# Patient Record
Sex: MALE | Race: WHITE | NOT HISPANIC OR LATINO | Employment: FULL TIME | URBAN - METROPOLITAN AREA
[De-identification: names, ages, dates, MRNs, and addresses within clinical notes are randomized per-mention and may not be internally consistent; named-entity substitution may affect disease eponyms.]

---

## 2017-01-20 RX ORDER — FLUTICASONE PROPIONATE 50 MCG
SPRAY, SUSPENSION (ML) NASAL
Qty: 48 G | Refills: 0 | Status: SHIPPED | OUTPATIENT
Start: 2017-01-20 | End: 2017-09-28 | Stop reason: SDUPTHER

## 2017-02-12 RX ORDER — FINASTERIDE 5 MG/1
TABLET, FILM COATED ORAL
Qty: 90 TABLET | Refills: 1 | Status: SHIPPED | OUTPATIENT
Start: 2017-02-12 | End: 2017-11-29 | Stop reason: SDUPTHER

## 2017-03-03 RX ORDER — PRAVASTATIN SODIUM 40 MG/1
TABLET ORAL
Qty: 90 TABLET | Refills: 0 | Status: SHIPPED | OUTPATIENT
Start: 2017-03-03 | End: 2017-07-30 | Stop reason: SDUPTHER

## 2017-03-27 ENCOUNTER — OFFICE VISIT (OUTPATIENT)
Dept: INTERNAL MEDICINE | Facility: CLINIC | Age: 57
End: 2017-03-27
Payer: COMMERCIAL

## 2017-03-27 ENCOUNTER — HOSPITAL ENCOUNTER (OUTPATIENT)
Dept: RADIOLOGY | Facility: HOSPITAL | Age: 57
Discharge: HOME | End: 2017-03-27
Attending: INTERNAL MEDICINE
Payer: COMMERCIAL

## 2017-03-27 ENCOUNTER — HOSPITAL ENCOUNTER (OUTPATIENT)
Dept: CARDIOLOGY | Facility: CLINIC | Age: 57
Discharge: HOME | End: 2017-03-27
Payer: COMMERCIAL

## 2017-03-27 ENCOUNTER — CLINICAL SUPPORT (OUTPATIENT)
Dept: INTERNAL MEDICINE | Facility: CLINIC | Age: 57
End: 2017-03-27
Payer: COMMERCIAL

## 2017-03-27 ENCOUNTER — CLINICAL SUPPORT (OUTPATIENT)
Dept: INTERNAL MEDICINE | Facility: CLINIC | Age: 57
End: 2017-03-27

## 2017-03-27 VITALS
SYSTOLIC BLOOD PRESSURE: 110 MMHG | HEIGHT: 70 IN | DIASTOLIC BLOOD PRESSURE: 65 MMHG | BODY MASS INDEX: 25.88 KG/M2 | WEIGHT: 180.75 LBS

## 2017-03-27 DIAGNOSIS — Z00.00 ROUTINE GENERAL MEDICAL EXAMINATION AT A HEALTH CARE FACILITY: ICD-10-CM

## 2017-03-27 DIAGNOSIS — R06.83 SNORING: ICD-10-CM

## 2017-03-27 DIAGNOSIS — Z00.00 ROUTINE GENERAL MEDICAL EXAMINATION AT A HEALTH CARE FACILITY: Primary | ICD-10-CM

## 2017-03-27 DIAGNOSIS — Z00.00 ANNUAL PHYSICAL EXAM: Primary | ICD-10-CM

## 2017-03-27 LAB
ALBUMIN SERPL BCP-MCNC: 3.7 G/DL
ALP SERPL-CCNC: 60 U/L
ALT SERPL W/O P-5'-P-CCNC: 21 U/L
ANION GAP SERPL CALC-SCNC: 8 MMOL/L
AST SERPL-CCNC: 21 U/L
BILIRUB SERPL-MCNC: 0.8 MG/DL
BUN SERPL-MCNC: 16 MG/DL
CALCIUM SERPL-MCNC: 9.3 MG/DL
CHLORIDE SERPL-SCNC: 107 MMOL/L
CHOLEST/HDLC SERPL: 3.5 {RATIO}
CO2 SERPL-SCNC: 28 MMOL/L
COMPLEXED PSA SERPL-MCNC: 0.23 NG/ML
CREAT SERPL-MCNC: 1 MG/DL
ERYTHROCYTE [DISTWIDTH] IN BLOOD BY AUTOMATED COUNT: 12.5 %
EST. GFR  (AFRICAN AMERICAN): >60 ML/MIN/1.73 M^2
EST. GFR  (NON AFRICAN AMERICAN): >60 ML/MIN/1.73 M^2
ESTIMATED AVG GLUCOSE: 114 MG/DL
GLUCOSE SERPL-MCNC: 98 MG/DL
HBA1C MFR BLD HPLC: 5.6 %
HCT VFR BLD AUTO: 42.9 %
HDL/CHOLESTEROL RATIO: 28.6 %
HDLC SERPL-MCNC: 154 MG/DL
HDLC SERPL-MCNC: 44 MG/DL
HGB BLD-MCNC: 15.2 G/DL
LDLC SERPL CALC-MCNC: 92.8 MG/DL
MCH RBC QN AUTO: 30.7 PG
MCHC RBC AUTO-ENTMCNC: 35.4 %
MCV RBC AUTO: 87 FL
NONHDLC SERPL-MCNC: 110 MG/DL
PLATELET # BLD AUTO: 243 K/UL
PMV BLD AUTO: 9.6 FL
POTASSIUM SERPL-SCNC: 4.3 MMOL/L
PROT SERPL-MCNC: 7.2 G/DL
RBC # BLD AUTO: 4.95 M/UL
SODIUM SERPL-SCNC: 143 MMOL/L
TRIGL SERPL-MCNC: 86 MG/DL
TSH SERPL DL<=0.005 MIU/L-ACNC: 1.84 UIU/ML
WBC # BLD AUTO: 6.2 K/UL

## 2017-03-27 PROCEDURE — 93000 ELECTROCARDIOGRAM COMPLETE: CPT | Mod: S$GLB,,, | Performed by: INTERNAL MEDICINE

## 2017-03-27 PROCEDURE — 99396 PREV VISIT EST AGE 40-64: CPT | Mod: S$GLB,,, | Performed by: INTERNAL MEDICINE

## 2017-03-27 PROCEDURE — 84443 ASSAY THYROID STIM HORMONE: CPT

## 2017-03-27 PROCEDURE — 97750 PHYSICAL PERFORMANCE TEST: CPT | Mod: S$GLB,,, | Performed by: INTERNAL MEDICINE

## 2017-03-27 PROCEDURE — 85027 COMPLETE CBC AUTOMATED: CPT

## 2017-03-27 PROCEDURE — 99999 PR PBB SHADOW E&M-EST. PATIENT-LVL III: CPT | Mod: PBBFAC,,, | Performed by: INTERNAL MEDICINE

## 2017-03-27 PROCEDURE — 71020 XR CHEST PA AND LATERAL: CPT | Mod: 26,,, | Performed by: RADIOLOGY

## 2017-03-27 PROCEDURE — 83036 HEMOGLOBIN GLYCOSYLATED A1C: CPT

## 2017-03-27 PROCEDURE — 97802 MEDICAL NUTRITION INDIV IN: CPT | Mod: S$GLB,,, | Performed by: INTERNAL MEDICINE

## 2017-03-27 PROCEDURE — 84153 ASSAY OF PSA TOTAL: CPT

## 2017-03-27 PROCEDURE — 80061 LIPID PANEL: CPT

## 2017-03-27 PROCEDURE — 71020 XR CHEST PA AND LATERAL: CPT | Mod: TC

## 2017-03-27 PROCEDURE — 80053 COMPREHEN METABOLIC PANEL: CPT

## 2017-03-27 NOTE — PATIENT INSTRUCTIONS
Obstructive Sleep Apnea  Obstructive sleep apnea is a condition that causes your air passages to become narrowed or blocked during sleep. As a result, breathing stops for short periods. Your body wakes up enough for breathing to begin again, though you don't remember it. The cycle of stopped breathing and brief awakenings can repeat dozens of times a night. This prevents the body from getting to the deeper stages of sleep that are needed for good rest.  Signs of sleep apnea include loud snoring, noisy breathing, and gasping sounds during sleep. Daytime symptoms include waking up tired after a full night's sleep, waking up with headaches, feeling very sleepy or falling asleep during the day, and having problems with memory or concentration.  Risk factors for sleep apnea include:  · Being overweight  · Being a man, or a woman in menopause  · Smoking  · Using alcohol or sedating medications or herbs  · Having enlarged structures in the nose or throat  Home care  Lifestyle changes that can help treat snoring and sleep apnea include the following:  · If you are overweight, lose weight. Talk to your healthcare provider about a weight-loss plan for you.  · Avoid alcohol for 3 to 4 hours before bedtime. Avoid sedating medications. Ask your healthcare provider about the medications you take.  · If you smoke, talk to your healthcare provider about ways to quit.  · Sleep on your side. This can help prevent gravity from pulling relaxed throat tissues into your breathing passages.  · If you have allergies or sinus problems that block your nose, ask your healthcare provider for help.  Follow up  Follow up with your healthcare provider as advised. A diagnosis of sleep apnea is made with a sleep study. Your healthcare provider can tell you more about this test.  When to seek medical care  Sleep apnea can make you more likely to have certain health problems. These include high blood pressure, heart attack, stroke, and sexual  dysfunction. If you have sleep apnea, talk to your healthcare provider about the best treatments for you.  Date Last Reviewed: 5/3/2015  © 0232-0381 [employer]. [address] [place] [address]. All rights reserved. This information is not intended as a substitute for professional medical care. Always follow your healthcare professional's instructions.        Snoring and Sleep Apnea: Notes for a Partner  Snoring and sleep apnea affect your life, as well as your partners. You can help in the treatment of the problem. Be supportive. Encourage your partner both to get treatment and to make the adjustments needed to follow through.    Adjusting to changes  Your partners treatment may involve making changes to certain life habits. You can help your partner make and stick with these changes. For example:  · Support and even join your partners exercise program.  · Be supportive if your partner gets CPAP (continuous positive airway pressure). He or she may feel self-conscious at first. Remind your partner to expect adjustments to CPAP before it feels just right.  · Consider joining a snoring and sleep apnea support group.  Go along to see the healthcare provider  You can give the healthcare provider the best account of your partners nighttime breathing and snoring patterns. Try to go along to healthcare providers appointments. If you cant go, write notes for your partner to give to the healthcare provider. Describe your partners snoring and sleep breathing patterns in detail.  Tips for sleeping with a snorer  Until treatment takes care of your partners snoring:  · Try to go to bed first. It may help if youre already asleep when your partner starts to snore.  · Wear earplugs to bed. A fan or other source of background noise may also help drown out snoring.   Date Last Reviewed: 8/10/2015  © 1582-9066 [employer]. [address] [place] [address]. All rights reserved.  This information is not intended as a substitute for professional medical care. Always follow your healthcare professional's instructions.        What Are Snoring and Obstructive Sleep Apnea?  If youve ever had a stuffed-up nose, you know the feeling of trying to breathe through a very narrow passageway. This is what happens in your throat when you snore. While you sleep, structures in your throat partially block your air passage, making the passage narrow and hard to breathe through. If the entire passage becomes blocked and you cant breathe at all, you have sleep apnea.     Air moves freely through the nose, mouth and throat.   Snoring  If your throat structures are too large or the muscles relax too much during sleep, the air passage may be partially blocked. As air from the nose or mouth passes around this blockage, the throat structures vibrate, causing the familiar sound of snoring. At times, this sound can be so loud that snorers wake up others, or even themselves, during the night. Snoring gets worse as more and more of the air passage is blocked.     Air is blocked in the back of the mouth and throat.   Obstructive sleep apnea  If the structures completely block the throat, air cant flow to the lungs at all. This is called apnea (meaning no breathing). Since the lungs arent getting fresh air, the brain tells the body to wake up just enough to tighten the muscles and unblock the air passage. With a loud gasp, breathing begins again. This process may be repeated over and over again throughout the night, making your sleep fragmented with a lighter stage of sleep. Even though you do not remember waking up many times during the night to a lighter sleep, you feel tired the next day. The lack of sleep and fresh air can also strain your lungs, heart, and other organs, leading to problems such as high blood pressure, heart attack, or stroke.     Air may not be able to move freely past a deviated septum or  swollen turbinates.   Problems in the nose and jaw  Problems in the structure of the nose may obstruct breathing. A crooked (deviated) septum or swollen turbinates can make snoring worse or lead to apnea. Also, a receding jaw may make the tongue sit too far back, so its more likely to block the airway when youre asleep.        Date Last Reviewed: 7/18/2015  © 0922-2091 [employer]. [address] [place] [address], [place] [address]. All rights reserved. This information is not intended as a substitute for professional medical care. Always follow your healthcare professional's instructions.        Tips to Help Prevent Snoring  Your snoring may get better if you make a few simple changes in your sleeping and waking habits. These changes might be all you need to improve or even cure your snoring, or they may work best when used along with other types of treatment.    Sleep on your side  Sleeping on your side may keep throat tissue from blocking your air passage. This may improve or even cure snoring. But it can be hard to stop sleeping on your back. Try sewing a pocket or sock onto the back of a T-shirt or pajama top. Put a few tennis balls or a bag of unshelled nuts into this pocket or sock, then wear the shirt to bed. This will help keep you from rolling onto your back. If this doesn't work, try wearing a backpack full of foam pieces, or put a wedge-shaped pillow behind you. You can [employer] purchase devices online.  Avoid alcohol and certain medicines  Alcohol and medicines such as sedatives, sleeping pills, and antihistamines make breathing slower and more shallow. They also make your muscles relax, so structures in your throat can block your air passage. These changes can cause or worsen snoring. If you snore, avoid alcohol. Talk to your healthcare provider if you take medicines to help you sleep.  Lose weight  Too much weight can make snoring worse. Extra weight puts pressure on your neck tissues and lungs,  making breathing harder. If you're overweight, ask your healthcare provider about a weight-loss program.  Exercise regularly  Exercise can help you lose weight, tone your muscles, and make your lungs work better. These changes may help improve your snoring. Ask your healthcare provider about an exercise program like walking, or something else that you enjoy.  Unblock your nose  If something blocks your nose, treating the problem may help improve snoring. Your healthcare provider can suggest medications for allergies or sinus problems. Nasal saline rinses can also open up the nasal passages.Nasal strips applied on the bridge of the nose can aid breathing. Surgery can straighten a deviated septum, reduce the size of the turbinates, or remove polyps (growths). If you smoke, try to quit because smoking makes a stuffy nose worse.  Understanding the risks of sleep apnea  In some cases, snoring is not physically harmful, but it can be associated with a more serious condition called sleep apnea. Some common symptoms of sleep apnea include:  · Loud, frequent snoring  · Heavy daytime drowsiness  · Difficulty breathing during sleep  · Headaches upon awakening  If you are concerned that you might have sleep apnea, talk with your healthcare provider about tests and treatments that may help.   Date Last Reviewed: 8/9/2015  © 2983-6900 [employer]. [address] [place] [address], [place] [address]. All rights reserved. This information is not intended as a substitute for professional medical care. Always follow your healthcare professional's instructions.        Tips to Control Acid Reflux    To control acid reflux, youll need to make some basic diet and lifestyle changes. The simple steps outlined below may be all youll need to ease discomfort.  Watch what you eat  · Avoid fatty foods and spicy foods.  · Eat fewer acidic foods, such as citrus and tomato-based foods. These can increase symptoms.  · Limit drinking alcohol,  caffeine, and fizzy beverages. All increase acid reflux.  · Try limiting chocolate, peppermint, and spearmint. These can worsen acid reflux in some people.  Watch when you eat  · Avoid lying down for 3 hours after eating.  · Do not snack before going to bed.  Raise your head  Raising your head and upper body by 4 to 6 inches helps limit reflux when youre lying down. Put blocks under the head of your bed frame to raise it.  Other changes  · Lose weight, if you need to  · Dont exercise near bedtime  · Avoid tight-fitting clothes  · Limit aspirin and ibuprofen  · Stop smoking   Date Last Reviewed: 7/1/2016 © 2000-2016 [employer]. [address] [place] [address], [place] [address]. All rights reserved. This information is not intended as a substitute for professional medical care. Always follow your healthcare professional's instructions.        GERD (Adult)    The esophagus is a tube that carries food from the mouth to the stomach. A valve at the lower end of the esophagus prevents stomach acid from flowing upward. When this valve doesn't work properly, stomach contents may repeatedly flow back up (reflux) into the esophagus. This is called gastroesophageal reflux disease (GERD). GERD can irritate the esophagus. It can cause problems with swallowing or breathing. In severe cases, GERD can cause recurrent pneumonia or other serious problems.  Symptoms of reflux include burning, pressure or sharp pain in the upper abdomen or mid to lower chest. The pain can spread to the neck, back, or shoulder. There may be belching, an acid taste in the back of the throat, chronic cough, or sore throat or hoarseness. GERD symptoms often occur during the day after a big meal. They can also occur at night when lying down.   Home care  Lifestyle changes can help reduce symptoms. If needed, medicines may be prescribed. Symptoms often improve with treatment, but if treatment is stopped, the symptoms often return after a few months.  "So most persons with GERD will need to continue treatment.  Lifestyle changes  · Limit or avoid fatty, fried, and spicy foods, as well as coffee, chocolate, mint, and foods with high acid content such as tomatoes and citrus fruit and juices (orange, grapefruit, lemon).  · Dont eat large meals, especially at night. Frequent, smaller meals are best. Do not lie down right after eating. And dont eat anything 3 hours before going to bed.  · Avoid drinking alcohol and smoking. As much as possible, stay away from second hand smoke.  · If you are overweight, losing weight will reduce symptoms.   · Avoid wearing tight clothing around your stomach area.  · If your symptoms occur during sleep, use a foam wedge to elevate your upper body (not just your head.) Or, place 4" blocks under the head of your bed.  Medicines  If needed, medicines can help relieve the symptoms of GERD and prevent damage to the esophagus. Discuss a medicine plan with your healthcare provider. This may include one or more of the following medicines:  · Antacids to help neutralize the normal acids in your stomach.  · Acid blockers (H2 blockers) to decrease acid production.  · Acid inhibitors (PPIs) to decrease acid production in a different way than the blockers. They may work better, but can take a little longer to take effect.  Take an antacid 30-60 minutes after eating and at bedtime, but not at the same time as an acid blocker.  Try not to take medicines such as ibuprofen and aspirin. If you are taking aspirin for your heart or other medical reasons, talk to your healthcare provider about stopping it.  Follow-up care  Follow up with your healthcare provider or as advised by our staff.  When to seek medical advice  Call your healthcare provider if any of the following occur:  · Stomach pain gets worse or moves to the lower right abdomen (appendix area)  · Chest pain appears or gets worse, or spreads to the back, neck, shoulder, or arm  · Frequent " vomiting (cant keep down liquids)  · Blood in the stool or vomit (red or black in color)  · Feeling weak or dizzy  · Fever of 100.4ºF (38ºC) or higher, or as directed by your healthcare provider  Date Last Reviewed: 6/23/2015  © 3196-4187 [employer]. [address] [place] [address]. All rights reserved. This information is not intended as a substitute for professional medical care. Always follow your healthcare professional's instructions.

## 2017-03-27 NOTE — PROGRESS NOTES
Subjective:       Patient ID: Waldo English is a 57 y.o. male.    Chief Complaint: Executive Health    HPI Comments: Annual exam    Doing well    Former smoker, quit 2000.    Has seen ENT Dr. Mejia and was told he may need to have uvula surgery.  Snoring is bad and wife says very bad. No stopping breathing.  Denies am fatigue.  Rests better on R side.    Some work stress but feels pretty rested.    FH colon cancer- will get colonoscopy soon.    Has GERD.  No alarm symptoms.  Really does not feel like he can do without his PPI.  Long-term risk of PPI use reviewed and discussed with him he may want to talk to his Gastroenterologist Dr. Jensen about doing an EGD when he has his colonoscopy as well.    BPH stable on meds.  Prostate cancer screening limitations reviewed.    Patient Active Problem List:     Mixed hyperlipidemia     Rhinitis     Benign non-nodular prostatic hyperplasia with lower urinary tract symptoms     Former smoker 1/2 pack daily for 10 years quit 2000     IFG (impaired fasting glucose)     Family history of colon cancer: father age 65     Gastroesophageal reflux disease without esophagitis     Non-rheumatic mitral regurgitation: Mitral regurgitation: trivial see ECHO 4/16      Review of Systems   Constitutional: Negative.    HENT: Negative for congestion, postnasal drip, rhinorrhea and sinus pressure.    Eyes: Negative for redness and visual disturbance.   Respiratory: Negative for apnea, choking, shortness of breath and stridor.    Cardiovascular: Negative for chest pain, palpitations and leg swelling.   Gastrointestinal: Negative for abdominal pain, constipation, diarrhea and nausea.        GERD   Genitourinary: Negative for difficulty urinating, flank pain, frequency, testicular pain and urgency.        Occasional nocturia   Musculoskeletal: Negative for arthralgias, back pain and myalgias.   Skin: Negative for color change and rash.   Neurological: Negative.    Psychiatric/Behavioral: Negative.         Objective:      Physical Exam   Constitutional: He is oriented to person, place, and time. He appears well-developed and well-nourished.   HENT:   Head: Normocephalic and atraumatic.   Right Ear: External ear normal.   Left Ear: External ear normal.   Eyes: Conjunctivae and EOM are normal.   Neck: Normal range of motion. Neck supple. No thyromegaly present.   Cardiovascular: Normal rate and regular rhythm.    No murmur heard.  Pulmonary/Chest: Effort normal and breath sounds normal. No respiratory distress. He has no wheezes.   Abdominal: Soft. He exhibits no distension. There is no tenderness.   Genitourinary:   Genitourinary Comments: Deferred per patient's wishes   Musculoskeletal: He exhibits no edema or tenderness.   Lymphadenopathy:     He has no cervical adenopathy.   Neurological: He is alert and oriented to person, place, and time. No cranial nerve deficit.   Skin: Skin is warm and dry.   Psychiatric: He has a normal mood and affect. His behavior is normal.       Assessment:       1. Annual physical exam    2. Snoring        Plan:         Annual physical exam    Snoring  -     Ambulatory consult to Sleep Disorders  -     Polysomnography 4 or more parameters; Future    GERD issues discussed, may need to have EGD.  Long term use of PPI reviewed  Exercise and lifestyle modification reviewed  IFG discussed    Annual follow-up

## 2017-03-27 NOTE — LETTER
March 27, 2017    Waldo English  [address]   [place] [address]             [facility]  [address]  [place] [address]  Phone: [phone]  Fax: [phone] Dear Mr. English:    Thank you for allowing me to serve you and perform your Executive Health exam on 3/27/2017.  This letter will serve a brief summary of the history, physical findings, and laboratory/studies performed and recommendations at that time.    Reason for Visit: Executive Health Preventive Physical Examination    Past Medical History:   Diagnosis Date    BPH (benign prostatic hypertrophy) 4/29/2015    Family history of colon cancer: father age 65 4/29/2015    Former smoker 1/2 pack daily for 10 years quit 2000 4/29/2015    GERD (gastroesophageal reflux disease) 4/29/2015    Hyperlipidemia 4/29/2015    Mitral regurgitation 4/22/2016    Rhinitis 4/29/2015       History reviewed. No pertinent surgical history.    Family History   Problem Relation Age of Onset    Hyperlipidemia Mother     Cancer Father 65     colon; brain; liver; bladder; lung    Heart disease Father      porcine valve    Multiple sclerosis Sister     Diabetes Sister     No Known Problems Daughter             Review of patient's allergies indicates:  No Known Allergies      Current Outpatient Prescriptions:     aspirin 81 MG Chew, Take 1 tablet (81 mg total) by mouth once daily., Disp: , Rfl:     finasteride (PROSCAR) 5 mg tablet, TAKE 1 TABLET DAILY, Disp: 90 tablet, Rfl: 1    fluticasone (FLONASE) 50 mcg/actuation nasal spray, 2 SPRAYS IN EACH NOSTRIL TWICE A DAY, Disp: 48 g, Rfl: 0    pravastatin (PRAVACHOL) 40 MG tablet, TAKE 1 TABLET DAILY, Disp: 90 tablet, Rfl: 0    rabeprazole (ACIPHEX) 20 mg tablet, TAKE 1 TABLET DAILY, Disp: 90 tablet, Rfl: 2    urea 30 % Crea, Apply 1 g topically 2 (two) times daily., Disp: 1 Tube, Rfl: 3     Review of Systems  Review of Systems - Negative except for snoring which has gotten worse, and  persistent GERD symptoms    Physical Exam:  General: General appearance: alert, well appearing, and in no distress.   Skin: Skin exam - normal coloration and turgor, no rashes, no suspicious skin lesions noted.  HEENT: Ears - bilateral TM's and external ear canals normal. , ENT exam reveals - ENT exam normal, no neck nodes or sinus tenderness.   Lungs: Chest: clear to auscultation, no wheezes, rales or rhonchi, symmetric air entry.   Heart: CVS exam: normal rate, regular rhythm, normal S1, S2, no murmurs, rubs, clicks or gallops.   Extremities: Exam of extremities: peripheral pulses normal, no pedal edema, no clubbing or cyanosis    Labs:  Results for orders placed or performed in visit on 03/27/17   Comprehensive metabolic panel   Result Value Ref Range    Sodium 143 136 - 145 mmol/L    Potassium 4.3 3.5 - 5.1 mmol/L    Chloride 107 95 - 110 mmol/L    CO2 28 23 - 29 mmol/L    Glucose 98 70 - 110 mg/dL    BUN, Bld 16 6 - 20 mg/dL    Creatinine 1.0 0.5 - 1.4 mg/dL    Calcium 9.3 8.7 - 10.5 mg/dL    Total Protein 7.2 6.0 - 8.4 g/dL    Albumin 3.7 3.5 - 5.2 g/dL    Total Bilirubin 0.8 0.1 - 1.0 mg/dL    Alkaline Phosphatase 60 55 - 135 U/L    AST 21 10 - 40 U/L    ALT 21 10 - 44 U/L    Anion Gap 8 8 - 16 mmol/L    eGFR if African American >60.0 >60 mL/min/1.73 m^2    eGFR if non African American >60.0 >60 mL/min/1.73 m^2   Hematology Profile   Result Value Ref Range    WBC 6.20 3.90 - 12.70 K/uL    RBC 4.95 4.60 - 6.20 M/uL    Hemoglobin 15.2 14.0 - 18.0 g/dL    Hematocrit 42.9 40.0 - 54.0 %    MCV 87 82 - 98 fL    MCH 30.7 27.0 - 31.0 pg    MCHC 35.4 32.0 - 36.0 %    RDW 12.5 11.5 - 14.5 %    Platelets 243 150 - 350 K/uL    MPV 9.6 9.2 - 12.9 fL   Lipid panel   Result Value Ref Range    Cholesterol 154 120 - 199 mg/dL    Triglycerides 86 30 - 150 mg/dL    HDL 44 40 - 75 mg/dL    LDL Cholesterol 92.8 63.0 - 159.0 mg/dL    HDL/Chol Ratio 28.6 20.0 - 50.0 %    Total Cholesterol/HDL Ratio 3.5 2.0 - 5.0    Non-HDL  Cholesterol 110 mg/dL   TSH   Result Value Ref Range    TSH 1.842 0.400 - 4.000 uIU/mL   PSA, Screening (every year)   Result Value Ref Range    PSA, SCREEN 0.23 0.00 - 4.00 ng/mL   Hemoglobin A1c   Result Value Ref Range    Hemoglobin A1C 5.6 4.5 - 6.2 %    Estimated Avg Glucose 114 68 - 131 mg/dL      EKG and chest xray were acceptable.    Assessment/Recommendations:  Routine Health Maintenance is up to date.  You are due for a colonoscopy this year and you indicated you would talk to Dr. Jensen but the possibility of an upper GI endoscopy at the same time.  The long-term use of proton pump inhibitors was reviewed and is not ideal so establishing a diagnosis and reassessing your acid reflux is going to be important.    We also discussed a sleep study and/or an assessment with a sleep specialist to evaluate your snoring.    At this time, you appear to be in good medical condition.   Please continue on your current medical regimen.  I look forward to seeing you again next year.  Please contact me should you have any questions or concerns regarding physical findings, or my recommendations.    Sincerely,          Elissa Maldonado MD, FACP

## 2017-03-27 NOTE — MR AVS SNAPSHOT
[facility]  [address]  [facility] [address]  Phone: [phone]  Fax: [phone]                  Waldo English   3/27/2017 11:00 AM   Office Visit    Description:  Male : 1960   Provider:  Elissa Maldonado MD   Department:  [facility]           Reason for Visit     Executive Health           Diagnoses this Visit        Comments    Annual physical exam    -  Primary     Snoring                To Do List           Goals (5 Years of Data)     None      [facility]     [facility] Care [facility] -  Assistance  Registered nurses in the [facility] provide clinical advisement, health education, appointment booking, and other advisory services.  Call for this free service at [phone].             Medications           Message regarding Medications     Verify the changes and/or additions to your medication regime listed below are the same as discussed with your clinician today.  If any of these changes or additions are incorrect, please notify your healthcare provider.             Verify that the below list of medications is an accurate representation of the medications you are currently taking.  If none reported, the list may be blank. If incorrect, please contact your healthcare provider. Carry this list with you in case of emergency.           Current Medications     aspirin 81 MG Chew Take 1 tablet (81 mg total) by mouth once daily.    finasteride (PROSCAR) 5 mg tablet TAKE 1 TABLET DAILY    fluticasone (FLONASE) 50 mcg/actuation nasal spray 2 SPRAYS IN EACH NOSTRIL TWICE A DAY    pravastatin (PRAVACHOL) 40 MG tablet TAKE 1 TABLET DAILY    rabeprazole (ACIPHEX) 20 mg tablet TAKE 1 TABLET DAILY    urea 30 % Crea Apply 1 g topically 2 (two) times daily.           Clinical Reference Information           Your Vitals Were     BP Height Weight BMI       110/65 (BP Location: Left arm, Patient Position: Sitting, BP Method: Manual) 5'  "10" (1.778 m) 82 kg (180 lb 12.4 oz) 25.94 kg/m2       Blood Pressure          Most Recent Value    BP  110/65      Allergies as of 3/27/2017     No Known Allergies      Immunizations Administered on Date of Encounter - 3/27/2017     None      Orders Placed During Today's Visit      Normal Orders This Visit    Ambulatory consult to Sleep Disorders     Future Labs/Procedures Expected by Expires    Polysomnography 4 or more parameters  As directed 3/27/2018      Instructions      Obstructive Sleep Apnea  Obstructive sleep apnea is a condition that causes your air passages to become narrowed or blocked during sleep. As a result, breathing stops for short periods. Your body wakes up enough for breathing to begin again, though you don't remember it. The cycle of stopped breathing and brief awakenings can repeat dozens of times a night. This prevents the body from getting to the deeper stages of sleep that are needed for good rest.  Signs of sleep apnea include loud snoring, noisy breathing, and gasping sounds during sleep. Daytime symptoms include waking up tired after a full night's sleep, waking up with headaches, feeling very sleepy or falling asleep during the day, and having problems with memory or concentration.  Risk factors for sleep apnea include:  · Being overweight  · Being a man, or a woman in menopause  · Smoking  · Using alcohol or sedating medications or herbs  · Having enlarged structures in the nose or throat  Home care  Lifestyle changes that can help treat snoring and sleep apnea include the following:  · If you are overweight, lose weight. Talk to your healthcare provider about a weight-loss plan for you.  · Avoid alcohol for 3 to 4 hours before bedtime. Avoid sedating medications. Ask your healthcare provider about the medications you take.  · If you smoke, talk to your healthcare provider about ways to quit.  · Sleep on your side. This can help prevent gravity from pulling relaxed throat tissues " into your breathing passages.  · If you have allergies or sinus problems that block your nose, ask your healthcare provider for help.  Follow up  Follow up with your healthcare provider as advised. A diagnosis of sleep apnea is made with a sleep study. Your healthcare provider can tell you more about this test.  When to seek medical care  Sleep apnea can make you more likely to have certain health problems. These include high blood pressure, heart attack, stroke, and sexual dysfunction. If you have sleep apnea, talk to your healthcare provider about the best treatments for you.  Date Last Reviewed: 5/3/2015  © 9796-4555 [employer] [address] [place] [address]. All rights reserved. This information is not intended as a substitute for professional medical care. Always follow your healthcare professional's instructions.        Snoring and Sleep Apnea: Notes for a Partner  Snoring and sleep apnea affect your life, as well as your partners. You can help in the treatment of the problem. Be supportive. Encourage your partner both to get treatment and to make the adjustments needed to follow through.    Adjusting to changes  Your partners treatment may involve making changes to certain life habits. You can help your partner make and stick with these changes. For example:  · Support and even join your partners exercise program.  · Be supportive if your partner gets CPAP (continuous positive airway pressure). He or she may feel self-conscious at first. Remind your partner to expect adjustments to CPAP before it feels just right.  · Consider joining a snoring and sleep apnea support group.  Go along to see the healthcare provider  You can give the healthcare provider the best account of your partners nighttime breathing and snoring patterns. Try to go along to healthcare providers appointments. If you cant go, write notes for your partner to give to the healthcare provider. Describe your  partners snoring and sleep breathing patterns in detail.  Tips for sleeping with a snorer  Until treatment takes care of your partners snoring:  · Try to go to bed first. It may help if youre already asleep when your partner starts to snore.  · Wear earplugs to bed. A fan or other source of background noise may also help drown out snoring.   Date Last Reviewed: 8/10/2015  © 8322-7283 [employer]. [address] [place] [address], [place] [address]. All rights reserved. This information is not intended as a substitute for professional medical care. Always follow your healthcare professional's instructions.        What Are Snoring and Obstructive Sleep Apnea?  If youve ever had a stuffed-up nose, you know the feeling of trying to breathe through a very narrow passageway. This is what happens in your throat when you snore. While you sleep, structures in your throat partially block your air passage, making the passage narrow and hard to breathe through. If the entire passage becomes blocked and you cant breathe at all, you have sleep apnea.     Air moves freely through the nose, mouth and throat.   Snoring  If your throat structures are too large or the muscles relax too much during sleep, the air passage may be partially blocked. As air from the nose or mouth passes around this blockage, the throat structures vibrate, causing the familiar sound of snoring. At times, this sound can be so loud that snorers wake up others, or even themselves, during the night. Snoring gets worse as more and more of the air passage is blocked.     Air is blocked in the back of the mouth and throat.   Obstructive sleep apnea  If the structures completely block the throat, air cant flow to the lungs at all. This is called apnea (meaning no breathing). Since the lungs arent getting fresh air, the brain tells the body to wake up just enough to tighten the muscles and unblock the air passage. With a loud gasp, breathing begins  again. This process may be repeated over and over again throughout the night, making your sleep fragmented with a lighter stage of sleep. Even though you do not remember waking up many times during the night to a lighter sleep, you feel tired the next day. The lack of sleep and fresh air can also strain your lungs, heart, and other organs, leading to problems such as high blood pressure, heart attack, or stroke.     Air may not be able to move freely past a deviated septum or swollen turbinates.   Problems in the nose and jaw  Problems in the structure of the nose may obstruct breathing. A crooked (deviated) septum or swollen turbinates can make snoring worse or lead to apnea. Also, a receding jaw may make the tongue sit too far back, so its more likely to block the airway when youre asleep.        Date Last Reviewed: 7/18/2015  © 9495-1793 [employer]. [address] [place] [address], [place] [address]. All rights reserved. This information is not intended as a substitute for professional medical care. Always follow your healthcare professional's instructions.        Tips to Help Prevent Snoring  Your snoring may get better if you make a few simple changes in your sleeping and waking habits. These changes might be all you need to improve or even cure your snoring, or they may work best when used along with other types of treatment.    Sleep on your side  Sleeping on your side may keep throat tissue from blocking your air passage. This may improve or even cure snoring. But it can be hard to stop sleeping on your back. Try sewing a pocket or sock onto the back of a T-shirt or pajama top. Put a few tennis balls or a bag of unshelled nuts into this pocket or sock, then wear the shirt to bed. This will help keep you from rolling onto your back. If this doesn't work, try wearing a backpack full of foam pieces, or put a wedge-shaped pillow behind you. You can [employer] purchase devices online.  Avoid alcohol and  certain medicines  Alcohol and medicines such as sedatives, sleeping pills, and antihistamines make breathing slower and more shallow. They also make your muscles relax, so structures in your throat can block your air passage. These changes can cause or worsen snoring. If you snore, avoid alcohol. Talk to your healthcare provider if you take medicines to help you sleep.  Lose weight  Too much weight can make snoring worse. Extra weight puts pressure on your neck tissues and lungs, making breathing harder. If you're overweight, ask your healthcare provider about a weight-loss program.  Exercise regularly  Exercise can help you lose weight, tone your muscles, and make your lungs work better. These changes may help improve your snoring. Ask your healthcare provider about an exercise program like walking, or something else that you enjoy.  Unblock your nose  If something blocks your nose, treating the problem may help improve snoring. Your healthcare provider can suggest medications for allergies or sinus problems. Nasal saline rinses can also open up the nasal passages.Nasal strips applied on the bridge of the nose can aid breathing. Surgery can straighten a deviated septum, reduce the size of the turbinates, or remove polyps (growths). If you smoke, try to quit because smoking makes a stuffy nose worse.  Understanding the risks of sleep apnea  In some cases, snoring is not physically harmful, but it can be associated with a more serious condition called sleep apnea. Some common symptoms of sleep apnea include:  · Loud, frequent snoring  · Heavy daytime drowsiness  · Difficulty breathing during sleep  · Headaches upon awakening  If you are concerned that you might have sleep apnea, talk with your healthcare provider about tests and treatments that may help.   Date Last Reviewed: 8/9/2015 © 2000-2016 [employer]. [address] [place] [address], [place] [address]. All rights reserved. This information is not  intended as a substitute for professional medical care. Always follow your healthcare professional's instructions.        Tips to Control Acid Reflux    To control acid reflux, youll need to make some basic diet and lifestyle changes. The simple steps outlined below may be all youll need to ease discomfort.  Watch what you eat  · Avoid fatty foods and spicy foods.  · Eat fewer acidic foods, such as citrus and tomato-based foods. These can increase symptoms.  · Limit drinking alcohol, caffeine, and fizzy beverages. All increase acid reflux.  · Try limiting chocolate, peppermint, and spearmint. These can worsen acid reflux in some people.  Watch when you eat  · Avoid lying down for 3 hours after eating.  · Do not snack before going to bed.  Raise your head  Raising your head and upper body by 4 to 6 inches helps limit reflux when youre lying down. Put blocks under the head of your bed frame to raise it.  Other changes  · Lose weight, if you need to  · Dont exercise near bedtime  · Avoid tight-fitting clothes  · Limit aspirin and ibuprofen  · Stop smoking   Date Last Reviewed: 7/1/2016  © 4479-5756 [employer]. [address] [place] [address]. All rights reserved. This information is not intended as a substitute for professional medical care. Always follow your healthcare professional's instructions.        GERD (Adult)    The esophagus is a tube that carries food from the mouth to the stomach. A valve at the lower end of the esophagus prevents stomach acid from flowing upward. When this valve doesn't work properly, stomach contents may repeatedly flow back up (reflux) into the esophagus. This is called gastroesophageal reflux disease (GERD). GERD can irritate the esophagus. It can cause problems with swallowing or breathing. In severe cases, GERD can cause recurrent pneumonia or other serious problems.  Symptoms of reflux include burning, pressure or sharp pain in the upper abdomen or mid  "to lower chest. The pain can spread to the neck, back, or shoulder. There may be belching, an acid taste in the back of the throat, chronic cough, or sore throat or hoarseness. GERD symptoms often occur during the day after a big meal. They can also occur at night when lying down.   Home care  Lifestyle changes can help reduce symptoms. If needed, medicines may be prescribed. Symptoms often improve with treatment, but if treatment is stopped, the symptoms often return after a few months. So most persons with GERD will need to continue treatment.  Lifestyle changes  · Limit or avoid fatty, fried, and spicy foods, as well as coffee, chocolate, mint, and foods with high acid content such as tomatoes and citrus fruit and juices (orange, grapefruit, lemon).  · Dont eat large meals, especially at night. Frequent, smaller meals are best. Do not lie down right after eating. And dont eat anything 3 hours before going to bed.  · Avoid drinking alcohol and smoking. As much as possible, stay away from second hand smoke.  · If you are overweight, losing weight will reduce symptoms.   · Avoid wearing tight clothing around your stomach area.  · If your symptoms occur during sleep, use a foam wedge to elevate your upper body (not just your head.) Or, place 4" blocks under the head of your bed.  Medicines  If needed, medicines can help relieve the symptoms of GERD and prevent damage to the esophagus. Discuss a medicine plan with your healthcare provider. This may include one or more of the following medicines:  · Antacids to help neutralize the normal acids in your stomach.  · Acid blockers (H2 blockers) to decrease acid production.  · Acid inhibitors (PPIs) to decrease acid production in a different way than the blockers. They may work better, but can take a little longer to take effect.  Take an antacid 30-60 minutes after eating and at bedtime, but not at the same time as an acid blocker.  Try not to take medicines such as " ibuprofen and aspirin. If you are taking aspirin for your heart or other medical reasons, talk to your healthcare provider about stopping it.  Follow-up care  Follow up with your healthcare provider or as advised by our staff.  When to seek medical advice  Call your healthcare provider if any of the following occur:  · Stomach pain gets worse or moves to the lower right abdomen (appendix area)  · Chest pain appears or gets worse, or spreads to the back, neck, shoulder, or arm  · Frequent vomiting (cant keep down liquids)  · Blood in the stool or vomit (red or black in color)  · Feeling weak or dizzy  · Fever of 100.4ºF (38ºC) or higher, or as directed by your healthcare provider  Date Last Reviewed: 6/23/2015 © 2000-2016 [employer] [address] [place] [address], [place] [address]. All rights reserved. This information is not intended as a substitute for professional medical care. Always follow your healthcare professional's instructions.             Smoking Cessation     If you would like to quit smoking:   You may be eligible for free services if you are a [place] resident and started smoking cigarettes before September 1, 1988.  Call the Smoking Cessation Trust (SCT) toll free at (184) 500-2614 or (454) 807-3002.   Call 1-111-QUIT-NOW if you do not meet the above criteria.            Language Assistance Services     ATTENTION: Language assistance services are available, free of charge. Please call [phone].      ATENCIÓN: Si habla español, tiene a kunz disposición servicios gratuitos de asistencia lingüística. Llame al [phone].     CHÚ Ý: N?u b?n nói Ti?ng Vi?t, có các d?ch v? h? tr? ngôn ng? mi?n phí dành cho b?n. G?i s? [phone].         [facility] complies with applicable Federal civil rights laws and does not discriminate on the basis of race, color, national origin, age, disability, or sex.

## 2017-03-27 NOTE — PROGRESS NOTES
Subjective:       Patient ID: Waldo English is a 57 y.o. male.    Chief Complaint: No chief complaint on file.    HPI   Mr. English has reported no previous history of cardiovascular or pulmonary disease. He has reported no physical limitations to exercise. Mr. English currently performs heavy weight resistance training 2-3 days per week with 10 minutes on the elliptical. He had many questions of concern about proper form and gear in the gym. Many questions revolved around performing deadlifts and squats. Mr. English performed the exercise motions and I adjusted him to correct form. He began performing the deadlift exercises with an alternated  instead of pronated. I encouraged him to alternate which hand was pronated and which was supinated in order to maintain  strength. We talked about wrist straps and weight belts. I explained that they should not be used unless performing a 1 repetition maximum. Wearing this gear will not allow muscles to properly develop. Mr. English mentioned that he begins to feel pain on the lowering phase of the deadlift. I advised him to decrease the weight and build the strength of his low back during the lowering phase. It was also advised that he focuses on tightening his core and controlling the downward motion instead of dropping the weight. Mr. English added that he has been performing less deadlifts and squats when compared to 2 years ago. He felt that he needed to add more upper body exercises.   Review of Systems    Objective:      The fitness evaluation results are as follows:    D.O.S. 3/27/2017 5/11/2015   Height (in): 69.5 70   Weight (lbs): 180 177.5   BMI: 26.26305 25.416945   Body Fat (%): 25.28 23.70   Waist (cm): 93 95   Hip (cm): 97 100   WHR: 0.96 0.95   RBP (mmHg): 112/80 98/60   RHR (bpm): 66 72    Strength R (lbs)t: 116 113    Strength Lt (lbs): 91.26452 104.73010   Push-up Assessment: 26 16   Curl-up Assessment: 48 75   Flexibility Testing (cm): 23 16    REE (kcals): 1470 1610     Physical Exam    Assessment:      Age/Gender Stratified Assessment:     Heart Rate: Normal   Resting BP: Normal   Body Fat %: Good   WHR Risk Factor: Moderate Risk    Strength R: Average    Strength L: Average   Upper Body Endurance: Excellent   Abdominal Endurance: Above Average   Lower body Flexibility: Fair     1. Routine general medical examination at a health care facility        Plan:    It was strongly encouraged that Mr. English increase his aerobic activity amount each week. It was recommended to perform 150 minutes of moderate intensity aerobic exercise each week in order to see benefits in heart health. He should continue with his 2-3 days per week with resistance training to maintain/increase his muscular strength and endurance. Mr. English currently follows an chino that tells him which exercises to perform. This chino focuses on lower body and I encouraged adding more upper body exercises stressing to target each muscle group. Daily stretching is the last recommendation for Mr. English to perform to help increase lower body range of motion. There were some difference in Mr. English's strength and abdominal endurance. This is associated with changed exercise routine in the past year.     Mr. English should focus on performing a balanced routine in order to maintain his current level of fitness.

## 2017-03-27 NOTE — PROGRESS NOTES
"Nutrition Assessment  Client name:  Waldo English  :  1960  Age:  57 y.o.  Gender:  male    Client states he is here for this third annual [employer] medical examination. Second time with the dietitian because he did not see one in 2016. He is proud to share he has stopped drinking coke (maybe 3 sips a week). Today he mentioned he is worried about carbs /protein /fiber. Mr. English wants to be stronger so he lifts weights 3x week, but follows no specific meal plan He will drink/eat EVault's protein drink/bar post workout. 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 A new  was hired recently at his law firm, so he expects his stress level to drop to a more tolerable level. He says he likes to cope with his stress with physical activity after work. He will exercise on his home-elliptical 10-15 mins 3x week in addition to strength training 2-3x/ week. His wife also adopted a new puppy, which he is unsure yet if it will be a de-stressor or an added stress. He eats small meals and snacks throughout the day and is teased in the office as being a "health nut."      Past Medical History:   Diagnosis Date    BPH (benign prostatic hypertrophy) 2015    Family history of colon cancer: father age 65 2015    Former smoker 1/2 pack daily for 10 years quit 2015    GERD (gastroesophageal reflux disease) 2015    Hyperlipidemia 2015    Mitral regurgitation 2016    Rhinitis 2015       Social History    Marital status:    Employment:  [employer]  Social History   Substance Use Topics    Smoking status: Former Smoker     Packs/day: 0.50     Years: 10.00     Quit date: 2000    Smokeless tobacco: Never Used    Alcohol use Yes      Comment: Rarely        Current medications:  has a current medication list which includes the following " prescription(s): aspirin, finasteride, fluticasone, pravastatin, rabeprazole, and urea.  Vitamins, minerals, and/or supplements:  MVI, fish oil, EPA, DHA, Omega-3   Food allergies or intolerances:  NKFA     Food History  Breakfast:  1 Rochelle Multigrain Waffle with 1/3 tsp honey, sometimes oatmeal. Sometimes Yoplait yogurt (regular fruit flavored)  Mid-morning Snack:  1 tsp peanut butter w candy  Lunch:  small portion meals out/ protein smoothie from [employer],   Mid-afternoon Snack:  1 tsp peanut butter  Dinner:  7-730 pm wife will cook (mash potatoes + meat/ poboys/ protein and starch) / Popeyes  H.S. Snack: Mendeley's Protein Bar/ Protein Shake    Exercise History:  Active. 10-15 mins 3x week elliptical. 2-3x week strength training 45 mins to failure.    Lab Reports   Total Cholesterol:  154  Triglycerides: 86  HDL:  44  LDL:  92.8  Glucose:  98  HbA1c:  5.6  BP:  112/80     Weight History  Height:  69.5 inches     Weight:  180 pounds  BMI:  26.2  % Body Fat:  25.28    Diagnosis  RMR (Method:  Body [place]):  1470 kcal  Activity Factor:  1.3  RJ:  1900    No nutritional diagnosis at this time.    Intervention    Goals:  1.  Increase fiber intake through legumes, whole grains, vegetables and fruit  2.  Continue current physical activity regime  3.  Decrease sugar consumption to <10g added sugar per meal    Discussed with client dietary sources of fiber to incorporate into daily meals and explained where hidden added sugars are in his diet to be decreased. Encouraged him to continue current physical activity. Answered questions regarding protein and carbohydrate needs and he verbalized understanding.    Handouts provided:  Meal Planning Guide  Restaurant Guide  Eat Fit Shopping List  Eat Fit Maty  Fast Food Guide  Vitamin/Mineral Guide    Monitoring/Evaluation    Monitor the following:  Weight  BMI  % Body Fat  Caloric intake    Follow Up Plan:  Follow up with client in 1-2 years

## 2017-04-13 RX ORDER — RABEPRAZOLE SODIUM 20 MG/1
20 TABLET, DELAYED RELEASE ORAL DAILY PRN
Qty: 90 TABLET | Refills: 0 | Status: SHIPPED | OUTPATIENT
Start: 2017-04-13 | End: 2017-07-16 | Stop reason: SDUPTHER

## 2017-07-16 RX ORDER — RABEPRAZOLE SODIUM 20 MG/1
TABLET, DELAYED RELEASE ORAL
Qty: 90 TABLET | Refills: 1 | Status: SHIPPED | OUTPATIENT
Start: 2017-07-16 | End: 2018-02-14 | Stop reason: SDUPTHER

## 2017-07-31 RX ORDER — PRAVASTATIN SODIUM 40 MG/1
TABLET ORAL
Qty: 90 TABLET | Refills: 1 | Status: SHIPPED | OUTPATIENT
Start: 2017-07-31 | End: 2018-01-27 | Stop reason: SDUPTHER

## 2017-09-28 RX ORDER — FLUTICASONE PROPIONATE 50 MCG
SPRAY, SUSPENSION (ML) NASAL
Qty: 48 G | Refills: 1 | Status: SHIPPED | OUTPATIENT
Start: 2017-09-28 | End: 2018-05-21 | Stop reason: SDUPTHER

## 2017-10-02 ENCOUNTER — TELEPHONE (OUTPATIENT)
Dept: INTERNAL MEDICINE | Facility: CLINIC | Age: 57
End: 2017-10-02

## 2017-10-02 NOTE — TELEPHONE ENCOUNTER
Records from [facility].  On September 27, 2017, the patient had EGD and colonoscopy per Dr. Jensen.  Biopsies from both still pending.

## 2017-11-30 RX ORDER — FINASTERIDE 5 MG/1
TABLET, FILM COATED ORAL
Qty: 90 TABLET | Refills: 1 | Status: SHIPPED | OUTPATIENT
Start: 2017-11-30 | End: 2018-07-24 | Stop reason: SDUPTHER

## 2018-01-27 RX ORDER — PRAVASTATIN SODIUM 40 MG/1
TABLET ORAL
Qty: 90 TABLET | Refills: 1 | Status: SHIPPED | OUTPATIENT
Start: 2018-01-27 | End: 2018-07-25 | Stop reason: SDUPTHER

## 2018-02-14 ENCOUNTER — TELEPHONE (OUTPATIENT)
Dept: INTERNAL MEDICINE | Facility: CLINIC | Age: 58
End: 2018-02-14

## 2018-02-14 DIAGNOSIS — Z00.00 ANNUAL PHYSICAL EXAM: Primary | ICD-10-CM

## 2018-02-15 RX ORDER — RABEPRAZOLE SODIUM 20 MG/1
TABLET, DELAYED RELEASE ORAL
Qty: 90 TABLET | Refills: 0 | Status: SHIPPED | OUTPATIENT
Start: 2018-02-15 | End: 2018-05-16 | Stop reason: SDUPTHER

## 2018-02-15 NOTE — TELEPHONE ENCOUNTER
I ok'd his meds    He needs an EPP with me March or April thanks    Please schedule and let me know, labs prior.  Orders in.  Thank you

## 2018-02-27 DIAGNOSIS — Z00.00 ROUTINE GENERAL MEDICAL EXAMINATION AT A HEALTH CARE FACILITY: Primary | ICD-10-CM

## 2018-03-24 ENCOUNTER — PATIENT MESSAGE (OUTPATIENT)
Dept: INTERNAL MEDICINE | Facility: CLINIC | Age: 58
End: 2018-03-24

## 2018-03-26 ENCOUNTER — HOSPITAL ENCOUNTER (OUTPATIENT)
Dept: RADIOLOGY | Facility: HOSPITAL | Age: 58
Discharge: HOME | End: 2018-03-26
Attending: INTERNAL MEDICINE
Payer: COMMERCIAL

## 2018-03-26 ENCOUNTER — CLINICAL SUPPORT (OUTPATIENT)
Dept: INTERNAL MEDICINE | Facility: CLINIC | Age: 58
End: 2018-03-26

## 2018-03-26 ENCOUNTER — HOSPITAL ENCOUNTER (OUTPATIENT)
Dept: CARDIOLOGY | Facility: CLINIC | Age: 58
Discharge: HOME | End: 2018-03-26
Payer: COMMERCIAL

## 2018-03-26 ENCOUNTER — CLINICAL SUPPORT (OUTPATIENT)
Dept: INTERNAL MEDICINE | Facility: CLINIC | Age: 58
End: 2018-03-26
Payer: COMMERCIAL

## 2018-03-26 ENCOUNTER — OFFICE VISIT (OUTPATIENT)
Dept: INTERNAL MEDICINE | Facility: CLINIC | Age: 58
End: 2018-03-26
Payer: COMMERCIAL

## 2018-03-26 VITALS
WEIGHT: 176.38 LBS | DIASTOLIC BLOOD PRESSURE: 70 MMHG | SYSTOLIC BLOOD PRESSURE: 110 MMHG | BODY MASS INDEX: 25.25 KG/M2 | HEIGHT: 70 IN

## 2018-03-26 DIAGNOSIS — Z00.00 ROUTINE GENERAL MEDICAL EXAMINATION AT A HEALTH CARE FACILITY: ICD-10-CM

## 2018-03-26 DIAGNOSIS — Z00.00 ROUTINE GENERAL MEDICAL EXAMINATION AT A HEALTH CARE FACILITY: Primary | ICD-10-CM

## 2018-03-26 DIAGNOSIS — Z00.00 ANNUAL PHYSICAL EXAM: Primary | ICD-10-CM

## 2018-03-26 DIAGNOSIS — K57.30 DIVERTICULOSIS OF LARGE INTESTINE WITHOUT HEMORRHAGE: ICD-10-CM

## 2018-03-26 DIAGNOSIS — R06.83 SNORING: ICD-10-CM

## 2018-03-26 DIAGNOSIS — N40.1 BENIGN NON-NODULAR PROSTATIC HYPERPLASIA WITH LOWER URINARY TRACT SYMPTOMS: ICD-10-CM

## 2018-03-26 DIAGNOSIS — R97.20 INCREASED PROSTATE SPECIFIC ANTIGEN (PSA) VELOCITY: ICD-10-CM

## 2018-03-26 LAB
ALBUMIN SERPL BCP-MCNC: 3.8 G/DL
ALP SERPL-CCNC: 64 U/L
ALT SERPL W/O P-5'-P-CCNC: 17 U/L
ANION GAP SERPL CALC-SCNC: 8 MMOL/L
AST SERPL-CCNC: 20 U/L
BILIRUB SERPL-MCNC: 0.8 MG/DL
BILIRUB UR QL STRIP: NEGATIVE
BUN SERPL-MCNC: 15 MG/DL
CALCIUM SERPL-MCNC: 9.5 MG/DL
CHLORIDE SERPL-SCNC: 107 MMOL/L
CHOLEST SERPL-MCNC: 150 MG/DL
CHOLEST/HDLC SERPL: 3.1 {RATIO}
CLARITY UR REFRACT.AUTO: CLEAR
CO2 SERPL-SCNC: 26 MMOL/L
COLOR UR AUTO: YELLOW
COMPLEXED PSA SERPL-MCNC: 1.1 NG/ML
CREAT SERPL-MCNC: 1 MG/DL
ERYTHROCYTE [DISTWIDTH] IN BLOOD BY AUTOMATED COUNT: 12.7 %
EST. GFR  (AFRICAN AMERICAN): >60 ML/MIN/1.73 M^2
EST. GFR  (NON AFRICAN AMERICAN): >60 ML/MIN/1.73 M^2
ESTIMATED AVG GLUCOSE: 103 MG/DL
GLUCOSE SERPL-MCNC: 92 MG/DL
GLUCOSE UR QL STRIP: NEGATIVE
HBA1C MFR BLD HPLC: 5.2 %
HCT VFR BLD AUTO: 43.7 %
HDLC SERPL-MCNC: 48 MG/DL
HDLC SERPL: 32 %
HGB BLD-MCNC: 14.8 G/DL
HGB UR QL STRIP: NEGATIVE
KETONES UR QL STRIP: NEGATIVE
LDLC SERPL CALC-MCNC: 92.2 MG/DL
LEUKOCYTE ESTERASE UR QL STRIP: NEGATIVE
MCH RBC QN AUTO: 30.4 PG
MCHC RBC AUTO-ENTMCNC: 33.9 G/DL
MCV RBC AUTO: 90 FL
NITRITE UR QL STRIP: NEGATIVE
NONHDLC SERPL-MCNC: 102 MG/DL
PH UR STRIP: 7 [PH] (ref 5–8)
PLATELET # BLD AUTO: 281 K/UL
PMV BLD AUTO: 9.6 FL
POTASSIUM SERPL-SCNC: 4.1 MMOL/L
PROT SERPL-MCNC: 7.1 G/DL
PROT UR QL STRIP: NEGATIVE
RBC # BLD AUTO: 4.87 M/UL
SODIUM SERPL-SCNC: 141 MMOL/L
SP GR UR STRIP: 1 (ref 1–1.03)
TRIGL SERPL-MCNC: 49 MG/DL
TSH SERPL DL<=0.005 MIU/L-ACNC: 1.88 UIU/ML
URN SPEC COLLECT METH UR: NORMAL
UROBILINOGEN UR STRIP-ACNC: NEGATIVE EU/DL
WBC # BLD AUTO: 6.75 K/UL

## 2018-03-26 PROCEDURE — 81003 URINALYSIS AUTO W/O SCOPE: CPT

## 2018-03-26 PROCEDURE — 80061 LIPID PANEL: CPT

## 2018-03-26 PROCEDURE — 71046 X-RAY EXAM CHEST 2 VIEWS: CPT | Mod: 26,,, | Performed by: RADIOLOGY

## 2018-03-26 PROCEDURE — 71046 X-RAY EXAM CHEST 2 VIEWS: CPT | Mod: TC,FY

## 2018-03-26 PROCEDURE — 93000 ELECTROCARDIOGRAM COMPLETE: CPT | Mod: S$GLB,,, | Performed by: INTERNAL MEDICINE

## 2018-03-26 PROCEDURE — 97802 MEDICAL NUTRITION INDIV IN: CPT | Mod: S$GLB,,, | Performed by: INTERNAL MEDICINE

## 2018-03-26 PROCEDURE — 84153 ASSAY OF PSA TOTAL: CPT

## 2018-03-26 PROCEDURE — 85027 COMPLETE CBC AUTOMATED: CPT

## 2018-03-26 PROCEDURE — 99999 PR PBB SHADOW E&M-EST. PATIENT-LVL III: CPT | Mod: PBBFAC,,, | Performed by: INTERNAL MEDICINE

## 2018-03-26 PROCEDURE — 84443 ASSAY THYROID STIM HORMONE: CPT

## 2018-03-26 PROCEDURE — 97750 PHYSICAL PERFORMANCE TEST: CPT | Mod: S$GLB,,, | Performed by: INTERNAL MEDICINE

## 2018-03-26 PROCEDURE — 99396 PREV VISIT EST AGE 40-64: CPT | Mod: S$GLB,,, | Performed by: INTERNAL MEDICINE

## 2018-03-26 PROCEDURE — 83036 HEMOGLOBIN GLYCOSYLATED A1C: CPT

## 2018-03-26 PROCEDURE — 80053 COMPREHEN METABOLIC PANEL: CPT

## 2018-03-26 RX ORDER — AMOXICILLIN AND CLAVULANATE POTASSIUM 875; 125 MG/1; MG/1
1 TABLET, FILM COATED ORAL 2 TIMES DAILY
Qty: 20 TABLET | Refills: 0 | Status: SHIPPED | OUTPATIENT
Start: 2018-03-26 | End: 2018-04-05

## 2018-03-26 NOTE — PROGRESS NOTES
"Nutrition Assessment  Client name:  Waldo English  :  1960  Age:  58 y.o.  Gender:  male    Client states:  Very pleasant employee of [employer] here for his annual [employer] physical.  Recalls meeting me in .  Immediately, expressed satisfaction regarding [employer] and the overall program.  Denies significant changes in his PMH although underwent colonoscopy and EGD last year.  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  Continues to exercise weekly, performing heavy weight lifting ("low reps, high weights") 3x/week.  Was advised by exercise physiologist, however, to incorporate cardio, which he plans to do as he has an elliptical and treadmill at home.  Regarding his eating habits, strongly believes in the concept of moderation and so, limits intake of Juma's fried chicken to once weekly.  Has also reduced his intake of Coke and other such soft drinks; reduced his portion sizes particularly when dining out; and reduced his intake of sugar.  Was previously advised to limit sugar intake to no more than 10 gm/serving, which he has adhered to.  Switched breakfast choices from Eggo waffles to nonfat Greek yogurt + 1 mini powdered sugar donut.  Admits, however, that his wife continues to prepare large meals for dinner, which he does not care for as he exercises afterwards.  Also, desires to wean off of his GERD medication, recalling his knowledge of specific trigger foods.  Limits caffeine and screen time prior to bed and is considering purchasing a pillow that will automatically elevate his head when snoring.  Notes that his wife complains of frequent snoring although does not believe it impairs his breathing.  Has consulted with MD regarding such concern.  Is happy today to see slight improvements in lab results.  " "Plans to speak with MD regarding current rx medications and vitamin/mineral supplements.    Past Medical History:   Diagnosis Date    BPH (benign prostatic hypertrophy) 4/29/2015    Family history of colon cancer: father age 65 4/29/2015    Former smoker 1/2 pack daily for 10 years quit 2000 4/29/2015    GERD (gastroesophageal reflux disease) 4/29/2015    Hyperlipidemia 4/29/2015    Mitral regurgitation 4/22/2016    Rhinitis 4/29/2015       Social History    Marital status:    Employment:  [employer]  Social History   Substance Use Topics    Smoking status: Former Smoker     Packs/day: 0.50     Years: 10.00     Quit date: 4/29/2000    Smokeless tobacco: Never Used    Alcohol use Yes      Comment: Rarely        Current medications:  has a current medication list which includes the following prescription(s): aspirin, finasteride, fluticasone, pravastatin, rabeprazole, and urea.  Vitamins, minerals, and/or supplements:  Men's 50+ MVI, glucosamine chondroitin, fish oil, raspberry ketones   Food allergies or intolerances:  NKFA     Food History  Breakfast:  1 mini powdered sugar donut + nonfat Chobani Greek yogurt + coffee  Mid-morning Snack:  Fruit bar  Lunch:  (typically eaten out) "smaller portions"  Mid-afternoon Snack:  Same as mid-morning snack  Dinner:  Spaghetti + meatballs + Tanzanian bread  H.S. Snack:  None    Exercise History:  30-35 minutes heavy weight lifting (25 reps) 3x/week    Lab Reports   Total Cholesterol:  150    Triglycerides:  49  HDL:  48  LDL:  92.2   Glucose:  92  HbA1c:  5.2%  BP:  110/70     Weight History  Height:  5' 10"     Weight:  176#  BMI:  25.3  % Body Fat:  23.74%    Diagnosis  RMR (Method:  Body [place]):  1870 kcal  Activity Factor:  1.3  RJ:  2431 kcal    No nutrition-related diagnosis at this time.    Intervention    Goals:  1.  Maintain healthy body weight  2.  Incorporate 10 minutes of cardio/day (ex. treadmill, elliptical, etc.)  3.  Incorporate a 1/2 plate " of non-starchy vegetables with lunch and dinner  4.  Limit intake of foods that are spicy, fatty, fried, caffeinated, and tomato-based for GERD management    Reviewed CMP, lipid panel, and HbA1c, praising patient for weight maintenance, optimal lab results, physical activity consistency, improved dietary behaviors, and overall, health conscious behaviors.  Fitness consult revealed 4# weight loss, 1.5% body fat decrease, and 3 cm WC and HC reduction since 2017!  Answered patient's questions re: enriched versus whole grains, absorption of vitamin/mineral supplements, and factors affecting HbA1c and lipid panel.  Referred patient to the Office of Dietary Supplements for reliable information re: vitamins and minerals.  Encouraged patient to adhere to the RDA for all vitamins and minerals, keeping in mind the UL for all.  Discussed exercise physiologist's goal of increased cardio, advising the inclusion for added heart health and weight management.  Also, encouraged patient to include a 1/2 plate of non-starchy vegetables with lunch and dinner for added fiber, satiety, and reduction in overeating.  Briefly reviewed dietary strategies for GERD, which patient was aware of.  Expressed gratitude for nutrition education, recommendations received, and overall [employer] [facility] program services!    Handouts provided:  Meal Planning Guide  Restaurant Guide  Eat Fit Shopping List  Eat Fit Maty  Fast Food Guide  Vitamin/Mineral Guide    Monitoring/Evaluation    Monitor the following:  Weight  BMI    Follow Up Plan:  Follow up with client in 1-2 years

## 2018-03-26 NOTE — LETTER
March 26, 2018    Waldo English  [address]   [place] [id number]             [facility]  [address]  [place] [address]  Phone: [phone]  Fax: [phone] Dear Mr. English:    Thank you for allowing me to serve you and perform your [employer] exam on 3/26/2018.  This letter will serve a brief summary of the history, physical findings, and laboratory/studies performed and recommendations at that time.    Reason for Visit: [employer] Preventive Physical Examination    Past Medical History:   Diagnosis Date    BPH (benign prostatic hypertrophy) 4/29/2015    Diverticulosis of large intestine without hemorrhage: see colonoscopy 9/17 3/26/2018    Family history of colon cancer: father age 65 4/29/2015    Former smoker 1/2 pack daily for 10 years quit 2000 4/29/2015    GERD (gastroesophageal reflux disease) 4/29/2015    Hyperlipidemia 4/29/2015    Mitral regurgitation 4/22/2016    Rhinitis 4/29/2015       No past surgical history on file.    Family History   Problem Relation Age of Onset    Hyperlipidemia Mother     Cancer Father 65     colon; brain; liver; bladder; lung    Heart disease Father      porcine valve    Multiple sclerosis Sister     Diabetes Sister     No Known Problems Daughter             Review of patient's allergies indicates:  No Known Allergies      Current Outpatient Prescriptions:     aspirin 81 MG Chew, Take 1 tablet (81 mg total) by mouth once daily., Disp: , Rfl:     finasteride (PROSCAR) 5 mg tablet, TAKE 1 TABLET DAILY, Disp: 90 tablet, Rfl: 1    fluticasone (FLONASE) 50 mcg/actuation nasal spray, INHALE 2 SPRAYS IN EACH NOSTRIL TWICE A DAY, Disp: 48 g, Rfl: 1    pravastatin (PRAVACHOL) 40 MG tablet, TAKE 1 TABLET DAILY, Disp: 90 tablet, Rfl: 1    RABEprazole (ACIPHEX) 20 mg tablet, TAKE 1 TABLET DAILY AS NEEDED, Disp: 90 tablet, Rfl: 0    amoxicillin-clavulanate 875-125mg (AUGMENTIN) 875-125 mg per tablet, Take 1 tablet by  mouth 2 (two) times daily., Disp: 20 tablet, Rfl: 0    urea 30 % Crea, Apply 1 g topically 2 (two) times daily., Disp: 1 Tube, Rfl: 3     Review of Systems  Review of Systems - Negative except for a painful sensation lateral to the right side of the nose, as well as slight itching right testicle    Physical Exam:  General: General appearance: alert, well appearing, and in no distress.   Skin: Skin exam - normal coloration and turgor, no rashes, no suspicious skin lesions noted.  HEENT: Ears - bilateral TM's and external ear canals normal. , ENT exam reveals - ENT exam normal, no neck nodes or sinus tenderness.   Lungs: Chest: clear to auscultation, no wheezes, rales or rhonchi, symmetric air entry.   Heart: CVS exam: normal rate, regular rhythm, normal S1, S2, no murmurs, rubs, clicks or gallops.   Extremities: Exam of extremities: peripheral pulses normal, no pedal edema, no clubbing or cyanosis  Genital exam: superficial varicose veins R testicles    Labs:  Results for orders placed or performed in visit on 03/26/18   Comprehensive metabolic panel   Result Value Ref Range    Sodium 141 136 - 145 mmol/L    Potassium 4.1 3.5 - 5.1 mmol/L    Chloride 107 95 - 110 mmol/L    CO2 26 23 - 29 mmol/L    Glucose 92 70 - 110 mg/dL    BUN, Bld 15 6 - 20 mg/dL    Creatinine 1.0 0.5 - 1.4 mg/dL    Calcium 9.5 8.7 - 10.5 mg/dL    Total Protein 7.1 6.0 - 8.4 g/dL    Albumin 3.8 3.5 - 5.2 g/dL    Total Bilirubin 0.8 0.1 - 1.0 mg/dL    Alkaline Phosphatase 64 55 - 135 U/L    AST 20 10 - 40 U/L    ALT 17 10 - 44 U/L    Anion Gap 8 8 - 16 mmol/L    eGFR if African American >60.0 >60 mL/min/1.73 m^2    eGFR if non African American >60.0 >60 mL/min/1.73 m^2   CBC Without Differential   Result Value Ref Range    WBC 6.75 3.90 - 12.70 K/uL    RBC 4.87 4.60 - 6.20 M/uL    Hemoglobin 14.8 14.0 - 18.0 g/dL    Hematocrit 43.7 40.0 - 54.0 %    MCV 90 82 - 98 fL    MCH 30.4 27.0 - 31.0 pg    MCHC 33.9 32.0 - 36.0 g/dL    RDW 12.7 11.5 - 14.5  %    Platelets 281 150 - 350 K/uL    MPV 9.6 9.2 - 12.9 fL   Lipid panel   Result Value Ref Range    Cholesterol 150 120 - 199 mg/dL    Triglycerides 49 30 - 150 mg/dL    HDL 48 40 - 75 mg/dL    LDL Cholesterol 92.2 63.0 - 159.0 mg/dL    HDL/Chol Ratio 32.0 20.0 - 50.0 %    Total Cholesterol/HDL Ratio 3.1 2.0 - 5.0    Non-HDL Cholesterol 102 mg/dL   TSH   Result Value Ref Range    TSH 1.877 0.400 - 4.000 uIU/mL   PSA, Screening (every year)   Result Value Ref Range    PSA, SCREEN 1.1 0.00 - 4.00 ng/mL   Hemoglobin A1c   Result Value Ref Range    Hemoglobin A1C 5.2 4.0 - 5.6 %    Estimated Avg Glucose 103 68 - 131 mg/dL      EKG is acceptable.  Urinalysis is also acceptable.    Assessment/Recommendations:  Routine Health Maintenance is up to date.    As we discussed, although your PSA is less than 4, it has at least tripled in the last year and so I am recommending a urology assessment for this as well as for the sensation that you have on the right testicle.    We also are given to a trial of a course of antibiotics for possible sinus inflammation causing the symptoms on the right side in the colon if your symptoms persist, we would consider an ENT assessment.    At this time, you appear to be in good medical condition.  I look forward to seeing you again next year.  Please contact me should you have any questions or concerns regarding physical findings, or my recommendations.        Sincerely,          Elissa Maldonado MD, FACP

## 2018-03-26 NOTE — PROGRESS NOTES
Subjective:       Patient ID: Waldo English is a 58 y.o. male.    Chief Complaint: No chief complaint on file.    HPI   Patient has reported no previous history of cardiovascular or pulmonary disease.    Physical Limitations:   - None    Current Exercise Routine:   - Weight lifts 3 days per week - Upper body only    Knowledgeable about fitness and exercise.     Review of Systems    Objective:      The fitness evaluation results are as follows:    D.O.S. 3/26/2018 3/27/2017 5/11/2015   Height (in): 70 69.5 70   Weight (lbs): 176 180 177.5   BMI: 25.1038308 26.614464 25.388230   Body Fat (%): 23.74 25.28 23.70   Waist (cm): 90 93 95   Hip (cm): 100 97 100   WHR: 0.90 0.96 0.95   RBP (mmHg): 122/78 112/80 98/60   RHR (bpm): 62 66 72    Strength R (lbs)t: 120.981730 116 113    Strength Lt (lbs): 105 91.914938 104.68597   Push-up Assessment: 27 26 16   Curl-up Assessment: 61 48 75   Flexibility Testing (cm): 32 23 16   REE (kcals): 1870 1470 1610     Physical Exam    Assessment:      Age/Gender Stratified Assessment:     Resting BP: Within Testing Limits   Body Fat %: Very Good   WHR Risk Factor: Low Risk    Strength R: Average    Strength L: Average   Upper Body Endurance: Excellent   Abdominal Endurance: Above Average   Lower body Flexibility: Very Good     1. Routine general medical examination at a health care facility        Plan:    Recommended to begin building a balanced routine with both upper and lower body exercises and aerobic activity. Below guidelines should be followed.    Recommended Fitness Guidelines:   - 150 minutes of moderate intensity aerobic exercise each week OR 75 minutes of vigorous    - 2-4 days per week of resistance training for each muscle group   - Daily stretching    Patient has elliptical and treadmill. Plans to begin performing 10 minutes in the morning each day of the week.

## 2018-03-26 NOTE — PROGRESS NOTES
Subjective:       Patient ID: Waldo English is a 58 y.o. male.    Chief Complaint: [employer]    EH PE    Still snoring- insurance did not pay for a sleep study.  Snores on his back not on his side.  Does not wake up tired.    Reduced caffeine- trying to get to bed earlier.  May try some nasal strips. Using a homeopathic remedy and this seems to be helping.    Did not have uvula surgery- ENT did not think it koko be helpful.    Pain/pressure point R side of nose.  Had a dental assessment and xrays and all OK.  Hurts to press.  Has been for several months to a year.    Also has some itching R side of scrotum- may have some varicose veins on R.  Does have a varicocoele on the left.  Annoying and sx chauncey after a little time.  Sx for about a year. Not red, no masses.  Increased PSA velocity- reviewed.  Some BPH sx takes meds for this.    EGD/colonoscopy done 9/17, Dr Jensen, biopsies negative.   Trying to reduce PPI 5/7 days.  No major sx currently.    Patient Active Problem List:     Mixed hyperlipidemia     Rhinitis     Benign non-nodular prostatic hyperplasia with lower urinary tract symptoms     Former smoker 1/2 pack daily for 10 years quit 2000     IFG (impaired fasting glucose)     Family history of colon cancer: father age 65     Gastroesophageal reflux disease without esophagitis     Non-rheumatic mitral regurgitation: Mitral regurgitation: trivial see ECHO 4/16        Review of Systems   Constitutional: Negative.    HENT: Negative for congestion, postnasal drip, rhinorrhea and sinus pressure.         See above  Snoring, pain R side nose   Eyes: Negative for redness and visual disturbance.   Respiratory: Negative for apnea, choking, shortness of breath and stridor.    Cardiovascular: Negative for chest pain, palpitations and leg swelling.   Gastrointestinal: Negative for abdominal pain, constipation, diarrhea and nausea.        Minimal GERD   Genitourinary: Negative for difficulty urinating, flank pain,  frequency, testicular pain and urgency.        Itching R side of scrotum   Musculoskeletal: Negative for arthralgias, back pain and myalgias.   Skin: Negative for color change and rash.   Neurological: Negative.    Psychiatric/Behavioral: Negative.        Objective:      Physical Exam   Constitutional: He is oriented to person, place, and time. He appears well-developed and well-nourished.   HENT:   Head: Normocephalic and atraumatic.   Right Ear: External ear normal.   Left Ear: External ear normal.   Pain R side of face next to nose   Eyes: Conjunctivae and EOM are normal.   Neck: Normal range of motion. Neck supple. No thyromegaly present.   Cardiovascular: Normal rate and regular rhythm.    No murmur heard.  Pulmonary/Chest: Effort normal and breath sounds normal. No respiratory distress. He has no wheezes.   Abdominal: Soft. He exhibits no distension. There is no tenderness.   Genitourinary: Penis normal.   Genitourinary Comments: Varicose veins R side   No masses or rash   Musculoskeletal: He exhibits no edema or tenderness.   Lymphadenopathy:     He has no cervical adenopathy.   Neurological: He is alert and oriented to person, place, and time. No cranial nerve deficit.   Skin: Skin is warm and dry.   Psychiatric: He has a normal mood and affect. His behavior is normal.       Assessment:       1. Annual physical exam    2. Diverticulosis of large intestine without hemorrhage: see colonoscopy 9/17    3. Increased prostate specific antigen (PSA) velocity    4. Benign non-nodular prostatic hyperplasia with lower urinary tract symptoms    5. Snoring        Plan:     Annual physical exam  -     Urinalysis    Diverticulosis of large intestine without hemorrhage: see colonoscopy 9/17: no issues currently    Increased prostate specific antigen (PSA) velocity  -     Ambulatory referral to Urology    Benign non-nodular prostatic hyperplasia with lower urinary tract symptoms: Urology follow up    Snoring  -     Home  Sleep Studies; Future    Other orders  -     amoxicillin-clavulanate 875-125mg (AUGMENTIN) 875-125 mg per tablet; Take 1 tablet by mouth 2 (two) times daily.  Dispense: 20 tablet; Refill: 0    Rest, fluids, acetaminophen, mucinex and follow up poor results.  Consider ENT assessment if sx persist      I will review all studies and determine further tx depending on findings    Addendum 4/10:  Medical clearance for cataract surgery - he is medically optimized.  [facility]    Addendum 7/11:  Medical clearance for cataract surgery - he is medically optimized.  [facility]

## 2018-03-28 ENCOUNTER — TELEPHONE (OUTPATIENT)
Dept: SLEEP MEDICINE | Facility: OTHER | Age: 58
End: 2018-03-28

## 2018-03-29 ENCOUNTER — PATIENT MESSAGE (OUTPATIENT)
Dept: INTERNAL MEDICINE | Facility: CLINIC | Age: 58
End: 2018-03-29

## 2018-03-29 ENCOUNTER — TELEPHONE (OUTPATIENT)
Dept: SLEEP MEDICINE | Facility: OTHER | Age: 58
End: 2018-03-29

## 2018-04-02 ENCOUNTER — TELEPHONE (OUTPATIENT)
Dept: SLEEP MEDICINE | Facility: OTHER | Age: 58
End: 2018-04-02

## 2018-04-02 NOTE — TELEPHONE ENCOUNTER
Pt has a clearance form attached to be completed for cataract removal of the R eye, pt is aware that PCP is out and form is not urgent

## 2018-04-03 ENCOUNTER — HOSPITAL ENCOUNTER (OUTPATIENT)
Dept: SLEEP MEDICINE | Facility: OTHER | Age: 58
Discharge: HOME | End: 2018-04-03
Attending: INTERNAL MEDICINE
Payer: COMMERCIAL

## 2018-04-03 DIAGNOSIS — G47.33 OSA (OBSTRUCTIVE SLEEP APNEA): ICD-10-CM

## 2018-04-03 DIAGNOSIS — R06.83 SNORING: ICD-10-CM

## 2018-04-03 PROCEDURE — 95800 SLP STDY UNATTENDED: CPT

## 2018-04-03 PROCEDURE — 95800 SLP STDY UNATTENDED: CPT | Mod: 26,,, | Performed by: PSYCHIATRY & NEUROLOGY

## 2018-04-03 NOTE — PROGRESS NOTES
I sized the device and showed the patient in the mirror when putting it on Per physician orders, patient was given home sleep testing device and instructed on how to apply the n themselves at home.  We reviewed the instruction booklet and the number to call if they have any questions at night.  Patient understood and was instructed to return the device the next back to the sleep lab. I explained how to use the device before going to bed tonight.

## 2018-04-10 RX ORDER — DUREZOL 0.5 MG/ML
EMULSION OPHTHALMIC
Refills: 1 | COMMUNITY
Start: 2018-03-30 | End: 2019-03-25

## 2018-04-10 RX ORDER — KETOROLAC TROMETHAMINE 5 MG/ML
SOLUTION OPHTHALMIC
Refills: 1 | COMMUNITY
Start: 2018-03-29 | End: 2019-03-25

## 2018-04-10 RX ORDER — CIPROFLOXACIN HYDROCHLORIDE 3 MG/ML
SOLUTION/ DROPS OPHTHALMIC
Refills: 1 | COMMUNITY
Start: 2018-03-29 | End: 2019-03-25

## 2018-04-13 ENCOUNTER — TELEPHONE (OUTPATIENT)
Dept: INTERNAL MEDICINE | Facility: CLINIC | Age: 58
End: 2018-04-13

## 2018-04-13 DIAGNOSIS — G47.33 OSA (OBSTRUCTIVE SLEEP APNEA): Primary | ICD-10-CM

## 2018-04-13 DIAGNOSIS — R06.83 SNORING: ICD-10-CM

## 2018-04-13 NOTE — TELEPHONE ENCOUNTER
He has sleep apnea.    He needs CPAP for home use; I placed orders    He also needs a consult in sleep clinic- please make sure this is scheduled, please let me know thanks

## 2018-04-17 ENCOUNTER — PATIENT MESSAGE (OUTPATIENT)
Dept: SLEEP MEDICINE | Facility: CLINIC | Age: 58
End: 2018-04-17

## 2018-04-17 ENCOUNTER — PATIENT MESSAGE (OUTPATIENT)
Dept: INTERNAL MEDICINE | Facility: CLINIC | Age: 58
End: 2018-04-17

## 2018-04-18 ENCOUNTER — TELEPHONE (OUTPATIENT)
Dept: SLEEP MEDICINE | Facility: CLINIC | Age: 58
End: 2018-04-18

## 2018-04-18 NOTE — TELEPHONE ENCOUNTER
----- Message from Martha Collazo sent at 4/18/2018 12:11 PM CDT -----  Contact: self  [phone]  Type: Returning a call    Who left a message? Lizzy    When did the practice call? yesterday    Comments:Please call patient back regarding message.

## 2018-05-08 ENCOUNTER — OFFICE VISIT (OUTPATIENT)
Dept: UROLOGY | Facility: CLINIC | Age: 58
End: 2018-05-08
Payer: COMMERCIAL

## 2018-05-08 VITALS
WEIGHT: 175.69 LBS | BODY MASS INDEX: 25.15 KG/M2 | HEIGHT: 70 IN | HEART RATE: 71 BPM | DIASTOLIC BLOOD PRESSURE: 75 MMHG | SYSTOLIC BLOOD PRESSURE: 118 MMHG

## 2018-05-08 DIAGNOSIS — R97.20 ELEVATED PSA: Primary | ICD-10-CM

## 2018-05-08 PROCEDURE — 99999 PR PBB SHADOW E&M-EST. PATIENT-LVL III: CPT | Mod: PBBFAC,,, | Performed by: UROLOGY

## 2018-05-08 PROCEDURE — 99244 OFF/OP CNSLTJ NEW/EST MOD 40: CPT | Mod: S$GLB,,, | Performed by: UROLOGY

## 2018-05-08 NOTE — LETTER
May 8, 2018      Elissa Maldonado MD  [address]  [facility] [address]           [facility] 4th Floor  [address]  [facility] [address]  Phone: [phone]          Patient: Waldo English   MR Number: [id number]   YOB: 1960   Date of Visit: 5/8/2018       Dear Dr. Elissa Maldonado:    Thank you for referring Waldo English to me for evaluation. Attached you will find relevant portions of my assessment and plan of care.    If you have questions, please do not hesitate to call me. I look forward to following Waldo English along with you.    Sincerely,    Vanessa Frank MD    Enclosure  CC:  No Recipients    If you would like to receive this communication electronically, please contact [email or web address] or [phone] to request more information on [employer] Link access.    For providers and/or their staff who would like to refer a patient to [facility], please contact us through our one-stop-shop provider referral line, [facility], at [phone].    If you feel you have received this communication in error or would no longer like to receive these types of communications, please e-mail [email or web address]

## 2018-05-08 NOTE — PROGRESS NOTES
"Subjective:       Patient ID: Waldo English is a 58 y.o. male.    Chief Complaint: Establish Care (vericocele bilat, prostate ck. )    Waldo English is a 58 y.o. Male referred from Elissa Maldonado MD for elevated psa and   He is on finasteride initially for hair loss, most recently for BPH.   No family hx of prostate cancer.   Former smoker.     Urinary daytime voiding 2-3 times. No other LUTS.     "varicose veins" on the scrotum that are causing pruritis    Lab Results       Component                Value               Date                       PSA                      1.1                 03/26/2018                 PSA                      0.23                03/27/2017                 PSA                      0.31                04/22/2016                 PSA                      0.32                04/29/2015          Sexual active. No Ed.                   History reviewed. No pertinent surgical history.    Past Medical History:   Diagnosis Date    BPH (benign prostatic hypertrophy) 4/29/2015    Diverticulosis of large intestine without hemorrhage: see colonoscopy 9/17 3/26/2018    Family history of colon cancer: father age 65 4/29/2015    Former smoker 1/2 pack daily for 10 years quit 2000 4/29/2015    GERD (gastroesophageal reflux disease) 4/29/2015    Hyperlipidemia 4/29/2015    Mitral regurgitation 4/22/2016    Rhinitis 4/29/2015       Social History     Social History    Marital status:      Spouse name: N/A    Number of children: 1    Years of education: N/A     Occupational History    Not on file.     Social History Main Topics    Smoking status: Former Smoker     Packs/day: 0.50     Years: 20.00     Quit date: 4/29/2000    Smokeless tobacco: Never Used    Alcohol use Yes      Comment: Rarely    Drug use: No    Sexual activity: Not on file     Other Topics Concern    Not on file     Social History Narrative    No narrative on file       Family History   Problem Relation Age of " Onset    Hyperlipidemia Mother     Cancer Father 65        colon; brain; liver; bladder; lung    Heart disease Father         porcine valve    Multiple sclerosis Sister     Diabetes Sister     No Known Problems Daughter        Current Outpatient Prescriptions   Medication Sig Dispense Refill    aspirin 81 MG Chew Take 1 tablet (81 mg total) by mouth once daily.      ciprofloxacin HCl (CILOXAN) 0.3 % ophthalmic solution instill 1 drop into the right eye four times a day  1    DUREZOL 0.05 % Drop ophthalmic solution INSTILL 1 DROP INTO THE RIGHT EYE TWICE A DAY  1    finasteride (PROSCAR) 5 mg tablet TAKE 1 TABLET DAILY 90 tablet 1    fluticasone (FLONASE) 50 mcg/actuation nasal spray INHALE 2 SPRAYS IN EACH NOSTRIL TWICE A DAY 48 g 1    ketorolac 0.5% (ACULAR) 0.5 % Drop INSTILL 1 DROP INTO THE RIGHT EYE FOUR TIMES A DAY  1    pravastatin (PRAVACHOL) 40 MG tablet TAKE 1 TABLET DAILY 90 tablet 1    RABEprazole (ACIPHEX) 20 mg tablet TAKE 1 TABLET DAILY AS NEEDED 90 tablet 0    urea 30 % Crea Apply 1 g topically 2 (two) times daily. 1 Tube 3     No current facility-administered medications for this visit.        Review of patient's allergies indicates:  No Known Allergies     BMP  Lab Results   Component Value Date     03/26/2018    K 4.1 03/26/2018     03/26/2018    CO2 26 03/26/2018    BUN 15 03/26/2018    CREATININE 1.0 03/26/2018    CALCIUM 9.5 03/26/2018    ANIONGAP 8 03/26/2018    ESTGFRAFRICA >60.0 03/26/2018    EGFRNONAA >60.0 03/26/2018       Review of Systems   Constitutional: Negative for chills, fever and unexpected weight change.   HENT: Negative for hearing loss and nosebleeds.    Eyes: Negative for visual disturbance.   Respiratory: Negative for chest tightness.    Cardiovascular: Negative for chest pain.   Gastrointestinal: Negative for diarrhea.   Genitourinary: Positive for frequency. Negative for dysuria, nocturia and urgency.   Musculoskeletal: Negative for joint swelling.    Skin: Negative for rash.   Neurological: Negative for seizures.   Hematological: Does not bruise/bleed easily.   Psychiatric/Behavioral: Negative for behavioral problems.     Objective:      Physical Exam   Constitutional: He is oriented to person, place, and time. He appears well-developed and well-nourished.   HENT:   Head: Normocephalic and atraumatic.   Eyes: No scleral icterus.   Neck: Neck supple.   Cardiovascular: Normal rate and regular rhythm.    Pulmonary/Chest: Effort normal. No respiratory distress.   Abdominal: He exhibits no mass. Hernia confirmed negative in the right inguinal area and confirmed negative in the left inguinal area.   Genitourinary: Testes normal and penis normal. Circumcised.   Genitourinary Comments: Right superficial scrotal varices.   Prostate was smooth without nodularity. No rectal masses.  35 grams.     Musculoskeletal: He exhibits no tenderness.   Lymphadenopathy:     He has no cervical adenopathy. No inguinal adenopathy noted on the right or left side.   Neurological: He is alert and oriented to person, place, and time.   Skin: Skin is warm. No rash noted.     Psychiatric: He has a normal mood and affect.       Assessment:       1. Elevated PSA        Plan:   Waldo was seen today for establish care.    Diagnoses and all orders for this visit:    Elevated PSA  -     Prostate Specific Antigen, Diagnostic; Future    recheck psa with no ejaculation 3-4 days prior. PSA really 2.2 as he is on finasteride.   Is PSA more than 20% elevated from last year, will recommend prostate biopsy.    Patient reassured about scrotal varices. Scrotal elevation.     I would like to thank Elissa Maldonado MD for referral of this patient.

## 2018-05-11 ENCOUNTER — PATIENT MESSAGE (OUTPATIENT)
Dept: UROLOGY | Facility: CLINIC | Age: 58
End: 2018-05-11

## 2018-05-11 ENCOUNTER — LAB VISIT (OUTPATIENT)
Dept: LAB | Facility: HOSPITAL | Age: 58
End: 2018-05-11
Attending: UROLOGY
Payer: COMMERCIAL

## 2018-05-11 DIAGNOSIS — R97.20 ELEVATED PSA: ICD-10-CM

## 2018-05-11 LAB — COMPLEXED PSA SERPL-MCNC: 0.25 NG/ML

## 2018-05-11 PROCEDURE — 36415 COLL VENOUS BLD VENIPUNCTURE: CPT

## 2018-05-11 PROCEDURE — 84153 ASSAY OF PSA TOTAL: CPT

## 2018-05-17 RX ORDER — RABEPRAZOLE SODIUM 20 MG/1
TABLET, DELAYED RELEASE ORAL
Qty: 90 TABLET | Refills: 0 | Status: SHIPPED | OUTPATIENT
Start: 2018-05-17 | End: 2018-08-14 | Stop reason: SDUPTHER

## 2018-05-21 DIAGNOSIS — J31.0 CHRONIC RHINITIS: Primary | ICD-10-CM

## 2018-05-21 RX ORDER — FLUTICASONE PROPIONATE 50 MCG
SPRAY, SUSPENSION (ML) NASAL
Qty: 48 G | Refills: 12 | Status: SHIPPED | OUTPATIENT
Start: 2018-05-21 | End: 2019-05-24 | Stop reason: SDUPTHER

## 2018-05-31 ENCOUNTER — OFFICE VISIT (OUTPATIENT)
Dept: SLEEP MEDICINE | Facility: CLINIC | Age: 58
End: 2018-05-31
Payer: COMMERCIAL

## 2018-05-31 VITALS
WEIGHT: 175 LBS | SYSTOLIC BLOOD PRESSURE: 125 MMHG | HEART RATE: 78 BPM | DIASTOLIC BLOOD PRESSURE: 73 MMHG | BODY MASS INDEX: 25.05 KG/M2 | HEIGHT: 70 IN

## 2018-05-31 DIAGNOSIS — G47.30 SLEEP APNEA, UNSPECIFIED TYPE: Primary | ICD-10-CM

## 2018-05-31 PROCEDURE — 3008F BODY MASS INDEX DOCD: CPT | Mod: CPTII,S$GLB,, | Performed by: PSYCHIATRY & NEUROLOGY

## 2018-05-31 PROCEDURE — 99999 PR PBB SHADOW E&M-EST. PATIENT-LVL III: CPT | Mod: PBBFAC,,, | Performed by: PSYCHIATRY & NEUROLOGY

## 2018-05-31 PROCEDURE — 99204 OFFICE O/P NEW MOD 45 MIN: CPT | Mod: S$GLB,,, | Performed by: PSYCHIATRY & NEUROLOGY

## 2018-05-31 NOTE — LETTER
May 31, 2018      Elissa Maldonado MD  [address]  [facility] [address]           [facility]  [address]  [facility] [address]  Phone: [phone]          Patient: Waldo English   MR Number: [id number]   YOB: 1960   Date of Visit: 5/31/2018       Dear Dr. Elissa Maldonado:    Thank you for referring Waldo English to me for evaluation. Attached you will find relevant portions of my assessment and plan of care.    If you have questions, please do not hesitate to call me. I look forward to following Waldo English along with you.    Sincerely,    Bonnie Mosher MD    Enclosure  CC:  No Recipients    If you would like to receive this communication electronically, please contact [email or web address] or [phone] to request more information on [employer] Link access.    For providers and/or their staff who would like to refer a patient to [facility], please contact us through our one-stop-shop provider referral line, [facility], at [phone].    If you feel you have received this communication in error or would no longer like to receive these types of communications, please e-mail [email or web address]

## 2018-05-31 NOTE — PROGRESS NOTES
Waldo English  was seen at the request of  Elissa Maldonado MD for sleep evaluation.    05/31/2018 INITIAL HISTORY OF PRESENT ILLNESS:  Waldo English is a 58 y.o. male is here to be evaluated for a sleep disorder.       CHIEF COMPLAINT:      The patient's complaints include excessive daytime sleepiness, excessive daytime fatigue, snoring and interrupted sleep since  Several years ago. No apneas and gasping.    Stopped drinking coffee 4-7 PM    He had in lab PSG recently which qualified him for ROSARIO - but he does not believe that he ever slept at all and ended up with severe migraine.      EPWORTH SLEEPINESS SCALE 5/28/2018   Sitting and reading 3   Watching TV 3   Sitting, inactive in a public place (e.g. a theatre or a meeting) 1   As a passenger in a car for an hour without a break 2   Lying down to rest in the afternoon when circumstances permit 1   Sitting and talking to someone 1   Sitting quietly after a lunch without alcohol 1   In a car, while stopped for a few minutes in traffic 1   Total score 13       SLEEP ROUTINE AND LIFESTYLE 05/31/2018 :  Sleep Clinic New Patient 5/28/2018   What time do you go to bed on a week day? (Give a range) 10:45 - 12:00   What time do you go to bed on a day off? (Give a range) 11:30 - 1:00   How long does it take you to fall asleep? (Give a range) < 5 mins   How long does it take you to fall back into sleep? (Give a range) usually  < 5 min   What time do you wake up to start your day on a week day? (Give a range) 5:45 - 6:30   What time do you wake up to start your day on a day off? (Give a range) 6:30    What time do you get out of bed? (Give a range) but will go back to sleep to about 7:30 and get up   Rate your sleep quality from 0 to 5 (0-poor, 5-great). 4   Have you experienced:  N/a   Have you ever had a sleep study/CPAP machine/surgery for sleep apnea? Yes   Have you ever had a CPAP machine for sleep apnea? No   Have you ever had surgery for sleep apnea? No       Sleep  Clinic ROS  5/28/2018   Difficulty breathing through the nose?  Sometimes   Sore throat or dry mouth in the morning? Sometimes   Irregular or very fast heart beat?  No   Shortness of breath?  Sometimes   Acid reflux? Yes   Body aches and pains?  No   Morning headaches? No   Dizziness? No   Mood changes?  No   Do you exercise?  Yes   Do you feel like moving your legs a lot?  Sometimes          Denies symptoms concerning for parasomnia        Social History:      Occupation:  chief firm officer  Bed partner: his wife -went to sleep to another room  Exercise routine: wt  Caffeine:  Coffee  - not in the evening     PREVIOUS SLEEP STUDIES:     PSG HST: Significant Obstructive sleep apnea (ROSARIO) with AHI (apnea hypopnea Index) of 11 (RDI23) and SaO2 of 86% (weight  178 lbs).        DME:       PAST MEDICAL HISTORY:    Active Ambulatory Problems     Diagnosis Date Noted    Mixed hyperlipidemia 04/29/2015    Chronic rhinitis 04/29/2015    Benign non-nodular prostatic hyperplasia with lower urinary tract symptoms 04/29/2015    Former smoker 1/2 pack daily for 10 years quit 2000 04/29/2015    IFG (impaired fasting glucose) 04/29/2015    Family history of colon cancer: father age 65 04/29/2015    Gastroesophageal reflux disease without esophagitis 04/22/2016    Non-rheumatic mitral regurgitation: Mitral regurgitation: trivial see ECHO 4/16 04/22/2016    Diverticulosis of large intestine without hemorrhage: see colonoscopy 9/17 03/26/2018    Snoring     ROSARIO (obstructive sleep apnea):  see HST 4/18      Resolved Ambulatory Problems     Diagnosis Date Noted    GERD (gastroesophageal reflux disease) 04/29/2015     Past Medical History:   Diagnosis Date    BPH (benign prostatic hypertrophy) 4/29/2015    Chronic rhinitis 4/29/2015    Diverticulosis of large intestine without hemorrhage: see colonoscopy 9/17 3/26/2018    Family history of colon cancer: father age 65 4/29/2015    Former smoker 1/2 pack daily for  10 years quit 2000 4/29/2015    GERD (gastroesophageal reflux disease) 4/29/2015    Hyperlipidemia 4/29/2015    Mitral regurgitation 4/22/2016    Rhinitis 4/29/2015                PAST SURGICAL HISTORY:    History reviewed. No pertinent surgical history.      FAMILY HISTORY:                Family History   Problem Relation Age of Onset    Hyperlipidemia Mother     Cancer Father 65        colon; brain; liver; bladder; lung    Heart disease Father         porcine valve    Multiple sclerosis Sister     Diabetes Sister     No Known Problems Daughter        SOCIAL HISTORY:          Tobacco:   History   Smoking Status    Former Smoker    Packs/day: 0.50    Years: 20.00    Quit date: 4/29/2000   Smokeless Tobacco    Never Used       alcohol use:    History   Alcohol Use    Yes     Comment: Rarely                   ALLERGIES:  Review of patient's allergies indicates:  No Known Allergies    CURRENT MEDICATIONS:    Current Outpatient Prescriptions   Medication Sig Dispense Refill    aspirin 81 MG Chew Take 1 tablet (81 mg total) by mouth once daily.      finasteride (PROSCAR) 5 mg tablet TAKE 1 TABLET DAILY 90 tablet 1    fluticasone (FLONASE) 50 mcg/actuation nasal spray INHALE 2 SPRAYS IN EACH NOSTRIL TWICE A DAY 48 g 12    pravastatin (PRAVACHOL) 40 MG tablet TAKE 1 TABLET DAILY 90 tablet 1    RABEprazole (ACIPHEX) 20 mg tablet TAKE 1 TABLET DAILY AS NEEDED 90 tablet 0    urea 30 % Crea Apply 1 g topically 2 (two) times daily. 1 Tube 3    ciprofloxacin HCl (CILOXAN) 0.3 % ophthalmic solution instill 1 drop into the right eye four times a day  1    DUREZOL 0.05 % Drop ophthalmic solution INSTILL 1 DROP INTO THE RIGHT EYE TWICE A DAY  1    ketorolac 0.5% (ACULAR) 0.5 % Drop INSTILL 1 DROP INTO THE RIGHT EYE FOUR TIMES A DAY  1     No current facility-administered medications for this visit.                     PHYSICAL EXAM:  /73 (BP Location: Left arm, Patient Position: Sitting, BP Method:  "Large (Automatic))   Pulse 78   Ht 5' 10" (1.778 m)   Wt 79.4 kg (175 lb)   BMI 25.11 kg/m²   GENERAL: Normal development, well groomed.  HEENT:   HEENT:  Conjunctivae are non-erythematous; Pupils equal, round, and reactive to light; Nose is symmetrical; Nasal mucosa is pink and moist; Septum is midline; Inferior turbinates are normal; Nasal airflow is normal; Posterior pharynx is pink; Modified Mallampati:III-IV; Posterior palate is very low; Tonsils not visualized; Uvula is normal and pink;Tongue is enlarged; Dentition is fair; No TMJ tenderness; Jaw opening and protrusion without click and without discomfort.  NECK: Supple. Neck circumference is 15.5 inches. No thyromegaly. No palpable nodes.     SKIN: On face and neck: No abrasions, no rashes, no lesions.  No subcutaneous nodules are palpable.  RESPIRATORY: Chest is clear to auscultation.  Normal chest expansion and non-labored breathing at rest.  CARDIOVASCULAR: Normal S1, S2.  No murmurs, gallops or rubs. No carotid bruits bilaterally.  No edema. No clubbing. No cyanosis.    NEURO: Oriented to time, place and person. Normal attention span and concentration. Gait normal.    PSYCH: Affect is full. Mood is normal  MUSCULOSKELETAL: Moves 4 extremities. Gait normal.         Using [facility]: yes      ASSESSMENT:    1. Sleep Apnea NEC. He had in lab PSG recently which qualified him for ROSARIO - but he does not believe that he ever slept at all and ended up with severe migraine.The patient symptomatically has  excessive daytime sleepiness, snoring, excessive daytime fatigue, interrupted sleep and nocturia  with exam findings of "a crowded oral airway and elevated body mass index. The patient has medical co-morbidities of hyperlipidemia and hypertension,  which can be worsened by ROSARIO. This warrants further investigation for possible obstructive sleep apnea.          PLAN:    Diagnostic: Polysomnogram in lab to confirm HST result validity and severity (did not " tolerate [place] -severe migraine  And does not recall sleeping). The nature of this procedure and its indication was discussed with the patient. He  would  like to come discuss PSG results.    Treatment options in detail - would like to discuss through [facility] the results of PSG.       More than 25 minutes of this 45 minutes visit was spent in counseling: during our discussion today, we talked about the etiology of  ROSARIO as well as the potential ramifications of untreated sleep apnea, which could include daytime sleepiness, hypertension, heart disease and/or stroke.  We discussed potential treatment options, which could include weight loss, body positioning, continuous positive airway pressure (CPAP), or referral for surgical consideration. Meanwhile, he  is urged to avoid supine sleep, weight gain and alcoholic beverages since all of these can worsen ROSARIO.     Precautions: The patient was advised to abstain from driving should he feel sleepy or drowsy.    Follow up: MD or NP  after the sleep study has been completed.     Thank you for allowing me the opportunity to participate in the care of your patient.    This visit summary will be sent to referring provider via inbasket

## 2018-05-31 NOTE — PATIENT INSTRUCTIONS
SLEEP LAB ([facility] or [facility]) will contact you to schedulethe sleep study. Their number is [phone] (ext 2). Please call them if you do not hear from them in 10 business days from now.  The [facility] [place], operated by [facility] is located on 7th floor of the [facility]; [place] lab is located in [facility].    SLEEP CLINIC (my assistant) will call you when the sleep study results are ready - if you have not heard from us by 2 weeks from the date of the study, please call [phone] (ext 1) or you can use [facility] to contact me.    You are advised to abstain from driving should you feel sleepy or drowsy.

## 2018-06-01 ENCOUNTER — PATIENT MESSAGE (OUTPATIENT)
Dept: UROLOGY | Facility: CLINIC | Age: 58
End: 2018-06-01

## 2018-06-12 ENCOUNTER — PATIENT MESSAGE (OUTPATIENT)
Dept: SLEEP MEDICINE | Facility: CLINIC | Age: 58
End: 2018-06-12

## 2018-06-15 DIAGNOSIS — G47.33 OBSTRUCTIVE SLEEP APNEA: Primary | ICD-10-CM

## 2018-07-10 ENCOUNTER — PATIENT MESSAGE (OUTPATIENT)
Dept: INTERNAL MEDICINE | Facility: CLINIC | Age: 58
End: 2018-07-10

## 2018-07-24 RX ORDER — FINASTERIDE 5 MG/1
TABLET, FILM COATED ORAL
Qty: 90 TABLET | Refills: 1 | Status: SHIPPED | OUTPATIENT
Start: 2018-07-24 | End: 2019-03-17 | Stop reason: SDUPTHER

## 2018-07-26 RX ORDER — PRAVASTATIN SODIUM 40 MG/1
TABLET ORAL
Qty: 90 TABLET | Refills: 1 | Status: SHIPPED | OUTPATIENT
Start: 2018-07-26 | End: 2019-01-22 | Stop reason: SDUPTHER

## 2018-08-14 RX ORDER — RABEPRAZOLE SODIUM 20 MG/1
TABLET, DELAYED RELEASE ORAL
Qty: 90 TABLET | Refills: 0 | Status: SHIPPED | OUTPATIENT
Start: 2018-08-14 | End: 2018-12-20 | Stop reason: SDUPTHER

## 2018-12-21 RX ORDER — RABEPRAZOLE SODIUM 20 MG/1
TABLET, DELAYED RELEASE ORAL
Qty: 90 TABLET | Refills: 0 | Status: SHIPPED | OUTPATIENT
Start: 2018-12-21 | End: 2019-12-11 | Stop reason: SDUPTHER

## 2019-01-22 RX ORDER — PRAVASTATIN SODIUM 40 MG/1
TABLET ORAL
Qty: 90 TABLET | Refills: 3 | Status: SHIPPED | OUTPATIENT
Start: 2019-01-22 | End: 2020-01-26 | Stop reason: SDUPTHER

## 2019-01-22 NOTE — TELEPHONE ENCOUNTER
Meds ok'd; patient needs [facility] appt in next 3 months, please call patient to schedule; please let me know thanks

## 2019-03-02 ENCOUNTER — PATIENT MESSAGE (OUTPATIENT)
Dept: SLEEP MEDICINE | Facility: CLINIC | Age: 59
End: 2019-03-02

## 2019-03-04 ENCOUNTER — TELEPHONE (OUTPATIENT)
Dept: SLEEP MEDICINE | Facility: CLINIC | Age: 59
End: 2019-03-04

## 2019-03-04 NOTE — TELEPHONE ENCOUNTER
Cecilia,    Please schedule the follow up with him and his machine to review everything in great detail.      Thanks!

## 2019-03-04 NOTE — TELEPHONE ENCOUNTER
I left pt a vm to call us to schedule follow up appt with Dr. Mosher. I still save a spot on Dr. Mosher's schedule on 5/7/19 at 2:30

## 2019-03-08 DIAGNOSIS — Z00.00 ROUTINE GENERAL MEDICAL EXAMINATION AT A HEALTH CARE FACILITY: Primary | ICD-10-CM

## 2019-03-17 RX ORDER — FINASTERIDE 5 MG/1
TABLET, FILM COATED ORAL
Qty: 90 TABLET | Refills: 1 | Status: SHIPPED | OUTPATIENT
Start: 2019-03-17 | End: 2020-07-26

## 2019-03-23 ENCOUNTER — PATIENT MESSAGE (OUTPATIENT)
Dept: INTERNAL MEDICINE | Facility: CLINIC | Age: 59
End: 2019-03-23

## 2019-03-25 ENCOUNTER — CLINICAL SUPPORT (OUTPATIENT)
Dept: INTERNAL MEDICINE | Facility: CLINIC | Age: 59
End: 2019-03-25

## 2019-03-25 ENCOUNTER — CLINICAL SUPPORT (OUTPATIENT)
Dept: INTERNAL MEDICINE | Facility: CLINIC | Age: 59
End: 2019-03-25
Payer: COMMERCIAL

## 2019-03-25 ENCOUNTER — HOSPITAL ENCOUNTER (OUTPATIENT)
Dept: RADIOLOGY | Facility: HOSPITAL | Age: 59
Discharge: HOME | End: 2019-03-25
Attending: INTERNAL MEDICINE
Payer: COMMERCIAL

## 2019-03-25 ENCOUNTER — HOSPITAL ENCOUNTER (OUTPATIENT)
Dept: CARDIOLOGY | Facility: CLINIC | Age: 59
Discharge: HOME | End: 2019-03-25
Payer: COMMERCIAL

## 2019-03-25 ENCOUNTER — OFFICE VISIT (OUTPATIENT)
Dept: INTERNAL MEDICINE | Facility: CLINIC | Age: 59
End: 2019-03-25
Payer: COMMERCIAL

## 2019-03-25 VITALS
WEIGHT: 180.75 LBS | SYSTOLIC BLOOD PRESSURE: 120 MMHG | HEIGHT: 70 IN | BODY MASS INDEX: 25.88 KG/M2 | DIASTOLIC BLOOD PRESSURE: 75 MMHG

## 2019-03-25 DIAGNOSIS — Z00.00 ROUTINE GENERAL MEDICAL EXAMINATION AT A HEALTH CARE FACILITY: Primary | ICD-10-CM

## 2019-03-25 DIAGNOSIS — Z00.00 ANNUAL PHYSICAL EXAM: Primary | ICD-10-CM

## 2019-03-25 DIAGNOSIS — Z00.00 ROUTINE GENERAL MEDICAL EXAMINATION AT A HEALTH CARE FACILITY: ICD-10-CM

## 2019-03-25 LAB
ALBUMIN SERPL BCP-MCNC: 3.5 G/DL (ref 3.5–5.2)
ALP SERPL-CCNC: 70 U/L (ref 55–135)
ALT SERPL W/O P-5'-P-CCNC: 20 U/L (ref 10–44)
ANION GAP SERPL CALC-SCNC: 9 MMOL/L (ref 8–16)
AST SERPL-CCNC: 21 U/L (ref 10–40)
BILIRUB SERPL-MCNC: 0.7 MG/DL (ref 0.1–1)
BUN SERPL-MCNC: 14 MG/DL (ref 6–20)
CALCIUM SERPL-MCNC: 9.8 MG/DL (ref 8.7–10.5)
CHLORIDE SERPL-SCNC: 108 MMOL/L (ref 95–110)
CHOLEST SERPL-MCNC: 119 MG/DL (ref 120–199)
CHOLEST/HDLC SERPL: 3.1 {RATIO} (ref 2–5)
CO2 SERPL-SCNC: 28 MMOL/L (ref 23–29)
COMPLEXED PSA SERPL-MCNC: 0.22 NG/ML (ref 0–4)
CREAT SERPL-MCNC: 0.9 MG/DL (ref 0.5–1.4)
ERYTHROCYTE [DISTWIDTH] IN BLOOD BY AUTOMATED COUNT: 12.4 % (ref 11.5–14.5)
EST. GFR  (AFRICAN AMERICAN): >60 ML/MIN/1.73 M^2
EST. GFR  (NON AFRICAN AMERICAN): >60 ML/MIN/1.73 M^2
ESTIMATED AVG GLUCOSE: 111 MG/DL (ref 68–131)
GLUCOSE SERPL-MCNC: 101 MG/DL (ref 70–110)
HBA1C MFR BLD HPLC: 5.5 % (ref 4–5.6)
HCT VFR BLD AUTO: 44.6 % (ref 40–54)
HDLC SERPL-MCNC: 38 MG/DL (ref 40–75)
HDLC SERPL: 31.9 % (ref 20–50)
HGB BLD-MCNC: 15.1 G/DL (ref 14–18)
LDLC SERPL CALC-MCNC: 65.8 MG/DL (ref 63–159)
MCH RBC QN AUTO: 30.6 PG (ref 27–31)
MCHC RBC AUTO-ENTMCNC: 33.9 G/DL (ref 32–36)
MCV RBC AUTO: 90 FL (ref 82–98)
NONHDLC SERPL-MCNC: 81 MG/DL
PLATELET # BLD AUTO: 271 K/UL (ref 150–350)
PMV BLD AUTO: 9.7 FL (ref 9.2–12.9)
POTASSIUM SERPL-SCNC: 5.2 MMOL/L (ref 3.5–5.1)
PROT SERPL-MCNC: 7.2 G/DL (ref 6–8.4)
RBC # BLD AUTO: 4.94 M/UL (ref 4.6–6.2)
SODIUM SERPL-SCNC: 145 MMOL/L (ref 136–145)
TRIGL SERPL-MCNC: 76 MG/DL (ref 30–150)
TSH SERPL DL<=0.005 MIU/L-ACNC: 1.77 UIU/ML (ref 0.4–4)
WBC # BLD AUTO: 6.93 K/UL (ref 3.9–12.7)

## 2019-03-25 PROCEDURE — 85027 COMPLETE CBC AUTOMATED: CPT

## 2019-03-25 PROCEDURE — 99396 PREV VISIT EST AGE 40-64: CPT | Mod: S$PBB,,, | Performed by: INTERNAL MEDICINE

## 2019-03-25 PROCEDURE — 93005 ELECTROCARDIOGRAM TRACING: CPT | Mod: PBBFAC | Performed by: INTERNAL MEDICINE

## 2019-03-25 PROCEDURE — 97802 MEDICAL NUTRITION INDIV IN: CPT | Mod: S$GLB,,, | Performed by: INTERNAL MEDICINE

## 2019-03-25 PROCEDURE — 99999 PR PBB SHADOW E&M-EST. PATIENT-LVL III: ICD-10-PCS | Mod: PBBFAC,,, | Performed by: INTERNAL MEDICINE

## 2019-03-25 PROCEDURE — 97750 PHYSICAL PERFORMANCE TEST: CPT | Mod: S$GLB,,, | Performed by: INTERNAL MEDICINE

## 2019-03-25 PROCEDURE — 83036 HEMOGLOBIN GLYCOSYLATED A1C: CPT

## 2019-03-25 PROCEDURE — 80053 COMPREHEN METABOLIC PANEL: CPT

## 2019-03-25 PROCEDURE — 93010 ELECTROCARDIOGRAM REPORT: CPT | Mod: S$PBB,,, | Performed by: INTERNAL MEDICINE

## 2019-03-25 PROCEDURE — 71046 XR CHEST PA AND LATERAL: ICD-10-PCS | Mod: 26,,, | Performed by: RADIOLOGY

## 2019-03-25 PROCEDURE — 97750 PR PHYSICAL PERFORMANCE TEST: ICD-10-PCS | Mod: S$GLB,,, | Performed by: INTERNAL MEDICINE

## 2019-03-25 PROCEDURE — 97802 PR MED NUTR THER, 1ST, INDIV, EA 15 MIN: ICD-10-PCS | Mod: S$GLB,,, | Performed by: INTERNAL MEDICINE

## 2019-03-25 PROCEDURE — 99999 PR PBB SHADOW E&M-EST. PATIENT-LVL III: CPT | Mod: PBBFAC,,, | Performed by: INTERNAL MEDICINE

## 2019-03-25 PROCEDURE — 84153 ASSAY OF PSA TOTAL: CPT

## 2019-03-25 PROCEDURE — 84443 ASSAY THYROID STIM HORMONE: CPT

## 2019-03-25 PROCEDURE — 99396 PR PREVENTIVE VISIT,EST,40-64: ICD-10-PCS | Mod: S$PBB,,, | Performed by: INTERNAL MEDICINE

## 2019-03-25 PROCEDURE — 71046 X-RAY EXAM CHEST 2 VIEWS: CPT | Mod: 26,,, | Performed by: RADIOLOGY

## 2019-03-25 PROCEDURE — 93010 EKG 12-LEAD: ICD-10-PCS | Mod: S$PBB,,, | Performed by: INTERNAL MEDICINE

## 2019-03-25 PROCEDURE — 71046 X-RAY EXAM CHEST 2 VIEWS: CPT | Mod: TC,FY

## 2019-03-25 PROCEDURE — 80061 LIPID PANEL: CPT

## 2019-03-25 NOTE — LETTER
March 25, 2019    Waldo English  [address]  [place] [address]             [facility]  [address]  [facility] [address]  Phone: [phone]  Fax: [phone] Dear Mr. English:    Thank you for allowing me to serve you and perform your Executive Health exam on 3/25/2019.  This letter will serve a brief summary of the history, physical findings, and laboratory/studies performed and recommendations at that time.    Reason for Visit: Executive Health Preventive Physical Examination    Past Medical History:   Diagnosis Date    BPH (benign prostatic hypertrophy) 4/29/2015    Chronic rhinitis 4/29/2015    Diverticulosis of large intestine without hemorrhage: see colonoscopy 9/17 3/26/2018    Family history of colon cancer: father age 65 4/29/2015    Former smoker 1/2 pack daily for 10 years quit 2000 4/29/2015    GERD (gastroesophageal reflux disease) 4/29/2015    Hyperlipidemia 4/29/2015    Mitral regurgitation 4/22/2016    Rhinitis 4/29/2015       Past Surgical History:   Procedure Laterality Date    cataract surgery      April then August 2018 bilateral       Family History   Problem Relation Age of Onset    Hyperlipidemia Mother     Heart disease Mother         A fib    Pulmonary fibrosis Mother     Cancer Father 65        colon; brain; liver; bladder; lung    Heart disease Father         porcine valve    Multiple sclerosis Sister     Diabetes Sister     No Known Problems Brother     No Known Problems Sister     No Known Problems Daughter             Review of patient's allergies indicates:  No Known Allergies      Current Outpatient Medications:     aspirin 81 MG Chew, Take 1 tablet (81 mg total) by mouth once daily., Disp: , Rfl:     finasteride (PROSCAR) 5 mg tablet, TAKE 1 TABLET DAILY, Disp: 90 tablet, Rfl: 1    fluticasone (FLONASE) 50 mcg/actuation nasal spray, INHALE 2 SPRAYS IN EACH NOSTRIL TWICE A DAY, Disp: 48 g, Rfl: 12    pravastatin (PRAVACHOL)  40 MG tablet, TAKE 1 TABLET DAILY, Disp: 90 tablet, Rfl: 3    RABEprazole (ACIPHEX) 20 mg tablet, TAKE 1 TABLET DAILY AS NEEDED, Disp: 90 tablet, Rfl: 0    ranitidine (ZANTAC) 150 MG capsule, , Disp: , Rfl:      Review of Systems  Review of Systems - Negative except for a sensation of discomfort R side of face adjacent to nose.    Physical Exam:  General: General appearance: alert, well appearing, and in no distress.   Skin: Skin exam - normal coloration and turgor, no rashes, no suspicious skin lesions noted.  HEENT: Ears - bilateral TM's and external ear canals normal. , ENT exam reveals - ENT exam normal, no neck nodes or sinus tenderness.   Lungs: Chest: clear to auscultation, no wheezes, rales or rhonchi, symmetric air entry.   Heart: CVS exam: normal rate, regular rhythm, normal S1, S2, no murmurs, rubs, clicks or gallops.   Extremities: Exam of extremities: peripheral pulses normal, no pedal edema, no clubbing or cyanosis    Labs:  Results for orders placed or performed in visit on 03/25/19   CBC Without Differential   Result Value Ref Range    WBC 6.93 3.90 - 12.70 K/uL    RBC 4.94 4.60 - 6.20 M/uL    Hemoglobin 15.1 14.0 - 18.0 g/dL    Hematocrit 44.6 40.0 - 54.0 %    MCV 90 82 - 98 fL    MCH 30.6 27.0 - 31.0 pg    MCHC 33.9 32.0 - 36.0 g/dL    RDW 12.4 11.5 - 14.5 %    Platelets 271 150 - 350 K/uL    MPV 9.7 9.2 - 12.9 fL   Lipid panel   Result Value Ref Range    Cholesterol 119 (L) 120 - 199 mg/dL    Triglycerides 76 30 - 150 mg/dL    HDL 38 (L) 40 - 75 mg/dL    LDL Cholesterol 65.8 63.0 - 159.0 mg/dL    HDL/Chol Ratio 31.9 20.0 - 50.0 %    Total Cholesterol/HDL Ratio 3.1 2.0 - 5.0    Non-HDL Cholesterol 81 mg/dL   TSH   Result Value Ref Range    TSH 1.771 0.400 - 4.000 uIU/mL   PSA, Screening (every year)   Result Value Ref Range    PSA, SCREEN 0.22 0.00 - 4.00 ng/mL   Hemoglobin A1c   Result Value Ref Range    Hemoglobin A1C 5.5 4.0 - 5.6 %    Estimated Avg Glucose 111 68 - 131 mg/dL   Comprehensive  metabolic panel   Result Value Ref Range    Sodium 145 136 - 145 mmol/L    Potassium 5.2 (H) 3.5 - 5.1 mmol/L    Chloride 108 95 - 110 mmol/L    CO2 28 23 - 29 mmol/L    Glucose 101 70 - 110 mg/dL    BUN, Bld 14 6 - 20 mg/dL    Creatinine 0.9 0.5 - 1.4 mg/dL    Calcium 9.8 8.7 - 10.5 mg/dL    Total Protein 7.2 6.0 - 8.4 g/dL    Albumin 3.5 3.5 - 5.2 g/dL    Total Bilirubin 0.7 0.1 - 1.0 mg/dL    Alkaline Phosphatase 70 55 - 135 U/L    AST 21 10 - 40 U/L    ALT 20 10 - 44 U/L    Anion Gap 9 8 - 16 mmol/L    eGFR if African American >60.0 >60 mL/min/1.73 m^2    eGFR if non African American >60.0 >60 mL/min/1.73 m^2      EKG and chest x-ray acceptable.    Assessment/Recommendations:  Routine Health Maintenance is up-to-date.  I recommend the shingles vaccine at age 60.    At this time, you appear to be in good medical condition.   I would recommend that you continue with the mupirocin for your nasal symptoms and follow-up in ENT if symptoms continue to be problematic.    Please continue to work on exercise and healthy habits as you are doing.  It appears to be working well for you.  A stress test next year would be a reasonable option given your previous mitral regurgitation which has been characterized as very mild.  Certainly, if symptoms worsen prior to that time, we can do some further studies before then.    Urologic issues appear to be stable presently.  If any symptoms recur, we would arrange for you to be seen in Urology again.    You mentioned a little bit of dermatitis on your arms, for this you may be able to use low-dose hydrocortisone cream periodically.  If symptoms continue, please let me know and we can arrange for you to be seen by a dermatologist.    I look forward to seeing you again next year.  Please contact me should you have any questions or concerns regarding physical findings, or my recommendations.        Sincerely,          Elissa Maldonado MD, FACP

## 2019-03-25 NOTE — PROGRESS NOTES
"Subjective:       Patient ID: Waldo English is a 59 y.o. male.    Chief Complaint: No chief complaint on file.    HPI   Pt. Has no significant cardiovascular or pulmonary history.  Patient has a history of hyperlipidemia for which he takes a statin.      Physical Limitations: None    Current exercise routine: Patient currently exercises 2-3 days a week when he ellipticals for 20 minutes and performs upper body resistance training exercises.  Patient stretches when he exercises.    Goals:  Patient set a goal weight of 175 lbs.    Fun Facts:  Patient was very friendly and engaged.  Patient likes to stay active and stated that for the most part he "feels like a 25 year old".  Patient was receptive to all recommendations made.      Review of Systems    Objective:     The fitness evaluation results are as follows:  D.O.S. 3/25/2019 3/26/2018 3/27/2017 5/11/2015   Height (in): 69.5 70 69.5 70   Weight (lbs): 178 176 180 177.5   BMI: 25.139826 25.642307 26.824762 25.90624   Body Fat (%): 22.29 23.74 25.28 23.70   Waist (cm): 97 90 93 95   Hip (cm): 102 100 97 100   WHR: 0.95 0.90 0.96 0.95   RBP (mmHg): 122/68 122/78 112/80 98/60   RHR (bpm): 74 62 66 72    Strength R (lbs)t: 115 120.30597 116 113    Strength Lt (lbs): 103.17496 105 91.116658 104.3333   Push-up Assessment: 24 27 26 16   Curl-up Assessment: 75 61 48 75   Flexibility Testing (cm): 22 32 23 16   REE (kcals): 1680 1870 1470 1610       Physical Exam    Assessment:     Age/gender stratified assessment:  Resting BP: Within Normal Limits   Body Fat %: Very Good   WHR Risk Factor: Low Risk    Strength R: Average    Strength L: Average   Upper Body Endurance: Excellent   Abdominal Endurance: Well Above Average   Lower body Flexibiltiy: Fair       1. Routine general medical examination at a health care facility        Plan:       Recommended fitness guidelines:    -150 minutes of moderate intensity aerobic exercise per week or 75 minutes of vigorous " intensity aerobic exercise per week.  Try to reach a minimum of 150 minutes of moderate to vigorous intensity exercise per week and 10,000 steps a day.  Incorporate some interval training to increase your heart rate for short periods of time.    -2 to 4 days per week of resistance training for each muscle group.  Start to incorporate the core stabilization program given during the evaluation.  Add some lower body resistance training exercises, such as squats and lunges, into your current routine.    -Daily stretching with a hold of at least 30 seconds per muscle group.  Practice the seated hamstring stretch, demonstrated during the evaluation, daily.

## 2019-03-25 NOTE — PROGRESS NOTES
"Nutrition Assessment  Client name:  Waldo English  :  1960  Age:  59 y.o.  Gender:  male    Client states:  Very pleasant employee of [employer] here for his annual Executive Health physical.  Is very familiar to me due to previous nutrition consults.  with a 28 year old daughter.  Denies significant changes in PMH since last year, noting that his GERD essentially remains stable with the exception of rx medication adjustment as he switched from a PPI to a H2 inhibitor.  Expresses slight disappointment when sharing that he unintentionally gained 4# since previous consult, attributing such to increased caloric intake.  Has been under heightened stress at work and so, has been "stress eating" more than usual.  Also, adopted the Intermittent Fasting Diet a month ago, fasting from 8 PM - 12 PM.  Plans to discontinue adherence, however, as he finds himself overeating from 12 - 8 PM as a result, particularly on sugary, starchy foods.  In addition, complains that his wife continues to prepare meals nightly, consisting of lean meat, mashed potatoes, corn, etc. despite his wishes for her not to as he dines out for lunch and so, is full upon return home from work.  Prefers not to eat a heavy meal close to bed.  Practices the concept of moderation, limits sodium intake, and continues to limit portion sizes, particularly when dining out.  Recalls nickname of "Dr. SHUKLA" within his office, inquiring about the FODMAP diet he heard on the radio this morning.  Continues to exercise weekly, averaging 20-30 minutes 3x/week and incorporated additional cardio over the past few months as previously recommended for improved heart health.  Is disappointed to see reduced HDL values as he expected greater outcomes due to increased cardio frequency.  Realizes, however, that his diet has been somewhat poor over the past few months, which he plans to improve as he discontinues Intermittent Fasting.      *To note, patient is a " "drsangitamer in a local band, [employer], and so, has been playing and practicing more than in the past.    Anthropometrics  Height:  5' 9.5"     Weight:  178#  BMI:  25.9  % Body Fat:  22.29%    Clinical Signs/Symptoms  N/V/D:  None  Appetite (Good, Fair, or Poor):  Good      Past Medical History:   Diagnosis Date    BPH (benign prostatic hypertrophy) 2015    Chronic rhinitis 2015    Diverticulosis of large intestine without hemorrhage: see colonoscopy 9/17 3/26/2018    Family history of colon cancer: father age 65 2015    Former smoker 1/2 pack daily for 10 years quit 2015    GERD (gastroesophageal reflux disease) 2015    Hyperlipidemia 2015    Mitral regurgitation 2016    Rhinitis 2015       Past Surgical History:   Procedure Laterality Date    cataract surgery      April then 2018 bilateral       Medications    has a current medication list which includes the following prescription(s): aspirin, finasteride, fluticasone, pravastatin, rabeprazole, and ranitidine.    Vitamins, Minerals, and/or Supplements:  Men's 50+ MVI, glucosamine chondroitin, fish oil, raspberry ketones      Food/Medication Interactions:  Reviewed     Food Allergies or Intolerances:  NKFA     Social History    Marital status:    Employment:   - [employer]    Social History     Tobacco Use    Smoking status: Former Smoker     Packs/day: 0.50     Years: 20.00     Pack years: 10.00     Last attempt to quit: 2000     Years since quittin.9    Smokeless tobacco: Never Used   Substance Use Topics    Alcohol use: Yes     Comment: Rarely, maybe 1-2 drinks a week if that        Lab Reports   Total Cholesterol:  119    Triglycerides:  76  HDL:  38  LDL:  65.8   Glucose:  101  HbA1c:  5.5%  BP:  120/75     Food History  Breakfast:  Skips  Mid-morning Snack:  Skips  Lunch:  ([place] eaten out [place] Moe cuisine/sandwich/salad  Mid-afternoon Snack:  King" cake/sugary, starchy snacks  Dinner:  [employer] chicken + root beer/chicken + mashed potatoes + corn  H.S. Snack:  None  *Fluid intake:  Water, coffee    Exercise History:  20-30 minutes elliptical or weight lifting 3x/week    Cultural/Spiritual/Personal Preferences:  None identified    Support System:  Family    State of Change:  Preparation    Barriers to Change:  None    Diagnosis    Altered nutrition-related laboratory values related to recent improper food choices as evidenced by HDL:  38.    Intervention    RMR (Method:  Body Van Zandt):  1680 kcal  Activity Factor:  1.3  RJ:  2184 kcal    Goals:  1.  HDL > 40  2.  Maintain healthy body weight  3.  Continue cardio and strength training workouts weekly, aiming for 150 minutes/week or more as tolerated  4.  Reduce intake of sugary, starchy snacks  5.  Avoid skipping meals    Nutrition Education  Reviewed CMP, lipid panel, and HbA1c, noting trends in HbA1c and HDL specifically, which patient attributes to poor dietary behaviors over the past few months despite increased cardio frequency.  Addressed such concerns, encouraging patient to avoid prolonged fasting as it resulted in subsequent overeating.  Discussed the potential consequences of meal skipping, advising patient to resume previous meal and snack frequency of every 2-3 hours.  Supported patient's desire for reduced intake of sugary, starchy snacks and complimented on awareness and identification of such behaviors and their associated health  consequences.  Answered patient's questions regarding the FODMAP diet in addition to complimenting him on increased cardio frequency.  Encouraged continuation of such for improved HDL values.        Patient verbalized understanding of nutrition education and recommendations received.    Handouts Provided  Meal Planning Guide  Restaurant Guide  Eat Fit Shopping List  Eat Fit Maty  Fast Food Guide  Vitamin/Mineral Guide    Monitoring/Evaluation    Monitor the  following:  Weight  BMI  % Body Fat  Caloric intake  Lipid panel    Follow Up Plan:  Communication with referring healthcare provider is unnecessary at this time as patient presented as part of annual wellness exam.  However, will follow up with patient in 1-2 years.

## 2019-03-25 NOTE — PROGRESS NOTES
Subjective:       Patient ID: Waldo English is a 59 y.o. male.    Chief Complaint: Executive Health     PE    Discomfort R side nose for several months.  Seen in ENT and given mupirocin no help.  This was 4 months ago Dr Mejia.    Also saw his dentist and no lesion identified- that was about 2 months.    4-5 days ago noted some pain with opening mouth.   He restarted the mupirocin and this has helped.  It is lateral to the nose on the R side.  He cannot feel a lump but has some pain lateral to the nose. No fever.  Slight sinus drip.      Had a HST and has ROSARIO, following in sleep clinic.  Snoring less.  Sleeping better overall.    Minimal dermatitis on his arms, will follow up with Dermatology.    Blood pressure doing well.  Lipids doing well.  Exercising regularly, takes the stairs at work.  No syncope or chest pain.  Still gets occasional shortness of breath with extreme exertion but there has been no worsening of symptoms and perhaps some slight improvement.    Last year PSA went up a bit but has normalized.  He saw Urology and exam was acceptable.  Does have some varicose veins.    Patient Active Problem List:     Mixed hyperlipidemia     Chronic rhinitis     Benign non-nodular prostatic hyperplasia with lower urinary tract symptoms     Former smoker 1/2 pack daily for 10 years quit 2000     IFG (impaired fasting glucose)     Family history of colon cancer: father age 65     Gastroesophageal reflux disease without esophagitis     Non-rheumatic mitral regurgitation: Mitral regurgitation: trivial see ECHO 4/16     Diverticulosis of large intestine without hemorrhage: see colonoscopy 9/17     ROSARIO (obstructive sleep apnea):  see HST 4/18      Review of Systems   Constitutional: Negative.    HENT: Negative for congestion, postnasal drip, rhinorrhea and sinus pressure.         See above R sided nasal issues   Eyes: Negative for redness and visual disturbance.   Respiratory: Positive for apnea. Negative for choking,  shortness of breath and stridor.         Snoring, ROSARIO on tx   Cardiovascular: Negative for chest pain, palpitations and leg swelling.   Gastrointestinal: Negative for abdominal pain, constipation, diarrhea and nausea.   Genitourinary: Negative for difficulty urinating, flank pain, frequency, testicular pain and urgency.   Musculoskeletal: Negative for arthralgias, back pain and myalgias.   Skin: Negative for color change and rash.        See above   Neurological: Negative.    Psychiatric/Behavioral: Negative.        Objective:      Physical Exam   Constitutional: He is oriented to person, place, and time. He appears well-developed and well-nourished.   HENT:   Head: Normocephalic and atraumatic.   Right Ear: External ear normal.   Left Ear: External ear normal.   I do not appreciate any lesion lateral to or in the nose.  Nares is normal.   Eyes: Pupils are equal, round, and reactive to light. Conjunctivae and EOM are normal.   Neck: Normal range of motion. Neck supple. No thyromegaly present.   Cardiovascular: Normal rate and regular rhythm.   No murmur heard.  Pulmonary/Chest: Effort normal and breath sounds normal. No respiratory distress. He has no wheezes.   Abdominal: Soft. He exhibits no distension. There is no tenderness.   Genitourinary:   Genitourinary Comments: Patient declines exam having seen Urology recently   Musculoskeletal: He exhibits no edema or tenderness.   Lymphadenopathy:     He has no cervical adenopathy.   Neurological: He is alert and oriented to person, place, and time. No cranial nerve deficit.   Skin: Skin is warm and dry.   Psychiatric: He has a normal mood and affect. His behavior is normal.       Assessment:       1. Annual physical exam        Plan:         Waldo was seen today for [employer].    Diagnoses and all orders for this visit:    Annual physical exam     Francisco recommended age 60   Continue with mupirocin which seems to be working, follow-up poor results   Keep ENT  and Dermatology follow-ups   Consider stress echo for age 60 or sooner if symptoms occur in the interim   Continue with ROSARIO treatment    I will review all studies and determine further tx depending on findings

## 2019-04-09 ENCOUNTER — PATIENT MESSAGE (OUTPATIENT)
Dept: SLEEP MEDICINE | Facility: CLINIC | Age: 59
End: 2019-04-09

## 2019-04-17 ENCOUNTER — TELEPHONE (OUTPATIENT)
Dept: INTERNAL MEDICINE | Facility: CLINIC | Age: 59
End: 2019-04-17

## 2019-04-17 RX ORDER — AMOXICILLIN AND CLAVULANATE POTASSIUM 875; 125 MG/1; MG/1
1 TABLET, FILM COATED ORAL 2 TIMES DAILY
Qty: 20 TABLET | Refills: 0 | Status: SHIPPED | OUTPATIENT
Start: 2019-04-17 | End: 2020-07-15

## 2019-04-17 NOTE — TELEPHONE ENCOUNTER
Allergies & sinus infection.  My mom is in CHF in ICU at [facility] & I need to clear myself & be well to be with her. I know short notice, tks   Pt requesting to see you or a RX to be send to his pharmacy   please advise

## 2019-04-17 NOTE — TELEPHONE ENCOUNTER
I was unsure which pharmacy so I printed it.  Please call to the preferred pharmacy.  Please time to increase fluids, Tylenol, Mucinex and follow up poor results.  Thank you

## 2019-04-17 NOTE — TELEPHONE ENCOUNTER
----- Message from Esperanza Murphy sent at 2019 11:50 AM CDT -----  Contact: my chart  Waldo English, 59 yrs, None  MRN:   [id number]  ::   1960  Lang:   English  Last BMI:   None  Pt María Status:   María  Prim Cvg::   BLUE CROSS BLUE SHIELD/[place] PPO  Cvg Last Verified Date:   3/15/2019  Patient Types:   None  PCP:   Elissa Maldonado MD  Care Team:     Allergies:   No Known Allergies  Patient Portal:   Active, 2019 1:09 PM  FYI  None   More Detail >>  Appointment Request  Waldo English  Sent:   7:54 AM  To: [facility]     aWldo English  MRN: [id number] : 1960  Pt Home: [phone]    Entered: [phone]      Message     ----- Message from [facility], System Message sent at 2019  7:54 AM CDT -----    Appointment Request From: Waldo English    With Provider: Elissa Maldonado MD [[facility]]    Preferred Date Range: 2019 - 2019    Preferred Times: Any time    Reason for visit: Existing Patient    Comments:  Allergies & sinus infection.  My mom is in CHF in ICU at [facility] & I need to clear myself & be well to be with her. I know short notice, tks

## 2019-04-27 ENCOUNTER — PATIENT MESSAGE (OUTPATIENT)
Dept: INTERNAL MEDICINE | Facility: CLINIC | Age: 59
End: 2019-04-27

## 2019-04-27 DIAGNOSIS — E78.2 MIXED HYPERLIPIDEMIA: Primary | ICD-10-CM

## 2019-04-29 RX ORDER — OMEGA-3-ACID ETHYL ESTERS 1 G/1
2 CAPSULE, LIQUID FILLED ORAL DAILY
Qty: 180 CAPSULE | Refills: 3 | Status: SHIPPED | OUTPATIENT
Start: 2019-04-29 | End: 2019-05-17 | Stop reason: SDUPTHER

## 2019-05-10 ENCOUNTER — PATIENT MESSAGE (OUTPATIENT)
Dept: INTERNAL MEDICINE | Facility: CLINIC | Age: 59
End: 2019-05-10

## 2019-05-10 DIAGNOSIS — E78.2 MIXED HYPERLIPIDEMIA: ICD-10-CM

## 2019-05-13 NOTE — TELEPHONE ENCOUNTER
Yes, okay, or we can try to send to our local pharmacy here    I did not put that it has to be brand-name, I would imagine they would fill the generic

## 2019-05-16 ENCOUNTER — TELEPHONE (OUTPATIENT)
Dept: INTERNAL MEDICINE | Facility: CLINIC | Age: 59
End: 2019-05-16

## 2019-05-16 NOTE — TELEPHONE ENCOUNTER
----- Message from Jerson De Luna sent at 5/16/2019  8:28 AM CDT -----  Contact: self/[phone]  Pt is calling to speak with someone in the office in regards to omega-3 acid ethyl esters (LOVAZA) 1 gram capsule PA. Pt would like to know what's going on with the PA. Please call and advise.        Thank You

## 2019-05-17 RX ORDER — OMEGA-3-ACID ETHYL ESTERS 1 G/1
2 CAPSULE, LIQUID FILLED ORAL DAILY
Qty: 180 CAPSULE | Refills: 3 | Status: SHIPPED | OUTPATIENT
Start: 2019-05-17 | End: 2020-07-15

## 2019-05-17 NOTE — TELEPHONE ENCOUNTER
Vanessa can you please send rx for Lovaza to [facility] here. They will get started on the PA.

## 2019-05-17 NOTE — TELEPHONE ENCOUNTER
----- Message from Yoly Sims sent at 5/17/2019  9:26 AM CDT -----  Contact: self/[phone]  Patient is returning a phone call.  Who left a message for the patient: Dr Maldonado's office   Does patient know what this is regarding:  no  Comments:

## 2019-05-23 ENCOUNTER — TELEPHONE (OUTPATIENT)
Dept: PHARMACY | Facility: CLINIC | Age: 59
End: 2019-05-23

## 2019-05-23 NOTE — TELEPHONE ENCOUNTER
Good Morning.    The prior authorization for Mr. English's generic Lovaza prescription has been denied per the patient's Blue Cross and Blue [employer] of [place] plan. The plan states that it will not cover the medication because the patient Triglyceride level is 76mg/dL (Ref. Range  mg/dL).    An attempt has been made in efforts to notify the patient.    If there are any additional questions or concerns please contact the pharmacy at, [phone].    Thanks.    Fe Hess CPhT.  Patient Care Advocate  [facility]  [email or web address]

## 2019-05-24 DIAGNOSIS — J31.0 CHRONIC RHINITIS: ICD-10-CM

## 2019-05-24 RX ORDER — FLUTICASONE PROPIONATE 50 MCG
2 SPRAY, SUSPENSION (ML) NASAL 2 TIMES DAILY
Qty: 48 G | Refills: 12 | Status: SHIPPED | OUTPATIENT
Start: 2019-05-24 | End: 2019-09-23 | Stop reason: SDUPTHER

## 2019-05-29 ENCOUNTER — PATIENT MESSAGE (OUTPATIENT)
Dept: INTERNAL MEDICINE | Facility: CLINIC | Age: 59
End: 2019-05-29

## 2019-05-29 NOTE — TELEPHONE ENCOUNTER
PA was completed by pharmacy. Insurance denied because of Cholesterol level did not meed requirements.

## 2019-07-18 NOTE — ADDENDUM NOTE
Addended by: NADIA DOUGHERTY on: 5/17/2019 04:41 PM     Modules accepted: Orders     shortness of breath

## 2019-09-23 DIAGNOSIS — J31.0 CHRONIC RHINITIS: ICD-10-CM

## 2019-09-23 RX ORDER — FLUTICASONE PROPIONATE 50 MCG
SPRAY, SUSPENSION (ML) NASAL
Qty: 32 G | Refills: 11 | Status: SHIPPED | OUTPATIENT
Start: 2019-09-23 | End: 2022-06-01 | Stop reason: SDUPTHER

## 2019-12-11 RX ORDER — RABEPRAZOLE SODIUM 20 MG/1
TABLET, DELAYED RELEASE ORAL
Qty: 90 TABLET | Refills: 0 | Status: SHIPPED | OUTPATIENT
Start: 2019-12-11 | End: 2020-05-12

## 2020-01-26 RX ORDER — PRAVASTATIN SODIUM 40 MG/1
TABLET ORAL
Qty: 90 TABLET | Refills: 4 | Status: SHIPPED | OUTPATIENT
Start: 2020-01-26 | End: 2021-04-20

## 2020-03-09 DIAGNOSIS — Z00.00 ROUTINE GENERAL MEDICAL EXAMINATION AT A HEALTH CARE FACILITY: Primary | ICD-10-CM

## 2020-05-12 RX ORDER — RABEPRAZOLE SODIUM 20 MG/1
TABLET, DELAYED RELEASE ORAL
Qty: 90 TABLET | Refills: 3 | Status: SHIPPED | OUTPATIENT
Start: 2020-05-12 | End: 2021-06-07

## 2020-07-15 ENCOUNTER — CLINICAL SUPPORT (OUTPATIENT)
Dept: INTERNAL MEDICINE | Facility: CLINIC | Age: 60
End: 2020-07-15

## 2020-07-15 ENCOUNTER — CLINICAL SUPPORT (OUTPATIENT)
Dept: INTERNAL MEDICINE | Facility: CLINIC | Age: 60
End: 2020-07-15
Payer: COMMERCIAL

## 2020-07-15 ENCOUNTER — OFFICE VISIT (OUTPATIENT)
Dept: INTERNAL MEDICINE | Facility: CLINIC | Age: 60
End: 2020-07-15
Payer: COMMERCIAL

## 2020-07-15 ENCOUNTER — HOSPITAL ENCOUNTER (OUTPATIENT)
Dept: CARDIOLOGY | Facility: CLINIC | Age: 60
Discharge: HOME | End: 2020-07-15
Payer: COMMERCIAL

## 2020-07-15 ENCOUNTER — HOSPITAL ENCOUNTER (OUTPATIENT)
Dept: RADIOLOGY | Facility: HOSPITAL | Age: 60
Discharge: HOME | End: 2020-07-15
Attending: INTERNAL MEDICINE
Payer: COMMERCIAL

## 2020-07-15 VITALS
BODY MASS INDEX: 25.77 KG/M2 | WEIGHT: 180 LBS | SYSTOLIC BLOOD PRESSURE: 120 MMHG | HEIGHT: 70 IN | DIASTOLIC BLOOD PRESSURE: 75 MMHG

## 2020-07-15 DIAGNOSIS — G47.33 OSA (OBSTRUCTIVE SLEEP APNEA): ICD-10-CM

## 2020-07-15 DIAGNOSIS — Z00.00 ANNUAL PHYSICAL EXAM: Primary | ICD-10-CM

## 2020-07-15 DIAGNOSIS — Z00.00 ROUTINE GENERAL MEDICAL EXAMINATION AT A HEALTH CARE FACILITY: Primary | ICD-10-CM

## 2020-07-15 DIAGNOSIS — Z00.00 ROUTINE GENERAL MEDICAL EXAMINATION AT A HEALTH CARE FACILITY: ICD-10-CM

## 2020-07-15 DIAGNOSIS — E78.2 MIXED HYPERLIPIDEMIA: ICD-10-CM

## 2020-07-15 DIAGNOSIS — N40.1 BENIGN NON-NODULAR PROSTATIC HYPERPLASIA WITH LOWER URINARY TRACT SYMPTOMS: ICD-10-CM

## 2020-07-15 DIAGNOSIS — K21.9 GASTROESOPHAGEAL REFLUX DISEASE WITHOUT ESOPHAGITIS: ICD-10-CM

## 2020-07-15 LAB
ALBUMIN SERPL BCP-MCNC: 3.9 G/DL (ref 3.5–5.2)
ALP SERPL-CCNC: 66 U/L (ref 55–135)
ALT SERPL W/O P-5'-P-CCNC: 19 U/L (ref 10–44)
ANION GAP SERPL CALC-SCNC: 6 MMOL/L (ref 8–16)
AST SERPL-CCNC: 21 U/L (ref 10–40)
BILIRUB SERPL-MCNC: 0.8 MG/DL (ref 0.1–1)
BUN SERPL-MCNC: 12 MG/DL (ref 6–20)
CALCIUM SERPL-MCNC: 9.3 MG/DL (ref 8.7–10.5)
CHLORIDE SERPL-SCNC: 106 MMOL/L (ref 95–110)
CHOLEST SERPL-MCNC: 145 MG/DL (ref 120–199)
CHOLEST/HDLC SERPL: 2.8 {RATIO} (ref 2–5)
CO2 SERPL-SCNC: 29 MMOL/L (ref 23–29)
COMPLEXED PSA SERPL-MCNC: 0.25 NG/ML (ref 0–4)
CREAT SERPL-MCNC: 1.1 MG/DL (ref 0.5–1.4)
ERYTHROCYTE [DISTWIDTH] IN BLOOD BY AUTOMATED COUNT: 12.4 % (ref 11.5–14.5)
EST. GFR  (AFRICAN AMERICAN): >60 ML/MIN/1.73 M^2
EST. GFR  (NON AFRICAN AMERICAN): >60 ML/MIN/1.73 M^2
ESTIMATED AVG GLUCOSE: 108 MG/DL (ref 68–131)
GLUCOSE SERPL-MCNC: 99 MG/DL (ref 70–110)
HBA1C MFR BLD HPLC: 5.4 % (ref 4–5.6)
HCT VFR BLD AUTO: 43.2 % (ref 40–54)
HDLC SERPL-MCNC: 51 MG/DL (ref 40–75)
HDLC SERPL: 35.2 % (ref 20–50)
HGB BLD-MCNC: 14.4 G/DL (ref 14–18)
LDLC SERPL CALC-MCNC: 84 MG/DL (ref 63–159)
MCH RBC QN AUTO: 30.4 PG (ref 27–31)
MCHC RBC AUTO-ENTMCNC: 33.3 G/DL (ref 32–36)
MCV RBC AUTO: 91 FL (ref 82–98)
NONHDLC SERPL-MCNC: 94 MG/DL
PLATELET # BLD AUTO: 225 K/UL (ref 150–350)
PMV BLD AUTO: 9.9 FL (ref 9.2–12.9)
POTASSIUM SERPL-SCNC: 4.5 MMOL/L (ref 3.5–5.1)
PROT SERPL-MCNC: 7.3 G/DL (ref 6–8.4)
RBC # BLD AUTO: 4.74 M/UL (ref 4.6–6.2)
SARS-COV-2 IGG SERPLBLD QL IA.RAPID: NEGATIVE
SODIUM SERPL-SCNC: 141 MMOL/L (ref 136–145)
TRIGL SERPL-MCNC: 50 MG/DL (ref 30–150)
TSH SERPL DL<=0.005 MIU/L-ACNC: 1.31 UIU/ML (ref 0.4–4)
WBC # BLD AUTO: 5.9 K/UL (ref 3.9–12.7)

## 2020-07-15 PROCEDURE — 99396 PR PREVENTIVE VISIT,EST,40-64: ICD-10-PCS | Mod: S$GLB,,, | Performed by: INTERNAL MEDICINE

## 2020-07-15 PROCEDURE — 99999 PR PBB SHADOW E&M-EST. PATIENT-LVL IV: ICD-10-PCS | Mod: PBBFAC,,, | Performed by: INTERNAL MEDICINE

## 2020-07-15 PROCEDURE — 86769 SARS-COV-2 COVID-19 ANTIBODY: CPT

## 2020-07-15 PROCEDURE — 93010 ELECTROCARDIOGRAM REPORT: CPT | Mod: S$GLB,,, | Performed by: INTERNAL MEDICINE

## 2020-07-15 PROCEDURE — 93010 EKG 12-LEAD: ICD-10-PCS | Mod: S$GLB,,, | Performed by: INTERNAL MEDICINE

## 2020-07-15 PROCEDURE — 99999 PR PBB SHADOW E&M-EST. PATIENT-LVL IV: CPT | Mod: PBBFAC,,, | Performed by: INTERNAL MEDICINE

## 2020-07-15 PROCEDURE — 84153 ASSAY OF PSA TOTAL: CPT

## 2020-07-15 PROCEDURE — 80061 LIPID PANEL: CPT

## 2020-07-15 PROCEDURE — 97802 MEDICAL NUTRITION INDIV IN: CPT | Mod: S$GLB,,, | Performed by: INTERNAL MEDICINE

## 2020-07-15 PROCEDURE — 85027 COMPLETE CBC AUTOMATED: CPT

## 2020-07-15 PROCEDURE — 97750 PHYSICAL PERFORMANCE TEST: CPT | Mod: S$GLB,,, | Performed by: INTERNAL MEDICINE

## 2020-07-15 PROCEDURE — 71046 X-RAY EXAM CHEST 2 VIEWS: CPT | Mod: 26,,, | Performed by: RADIOLOGY

## 2020-07-15 PROCEDURE — 71046 XR CHEST PA AND LATERAL: ICD-10-PCS | Mod: 26,,, | Performed by: RADIOLOGY

## 2020-07-15 PROCEDURE — 99396 PREV VISIT EST AGE 40-64: CPT | Mod: S$GLB,,, | Performed by: INTERNAL MEDICINE

## 2020-07-15 PROCEDURE — 80053 COMPREHEN METABOLIC PANEL: CPT

## 2020-07-15 PROCEDURE — 71046 X-RAY EXAM CHEST 2 VIEWS: CPT | Mod: TC

## 2020-07-15 PROCEDURE — 93005 EKG 12-LEAD: ICD-10-PCS | Mod: S$GLB,,, | Performed by: INTERNAL MEDICINE

## 2020-07-15 PROCEDURE — 83036 HEMOGLOBIN GLYCOSYLATED A1C: CPT

## 2020-07-15 PROCEDURE — 84443 ASSAY THYROID STIM HORMONE: CPT

## 2020-07-15 PROCEDURE — 3008F BODY MASS INDEX DOCD: CPT | Mod: CPTII,S$GLB,, | Performed by: INTERNAL MEDICINE

## 2020-07-15 PROCEDURE — 97750 PR PHYSICAL PERFORMANCE TEST: ICD-10-PCS | Mod: S$GLB,,, | Performed by: INTERNAL MEDICINE

## 2020-07-15 PROCEDURE — 93005 ELECTROCARDIOGRAM TRACING: CPT | Mod: S$GLB,,, | Performed by: INTERNAL MEDICINE

## 2020-07-15 PROCEDURE — 3008F PR BODY MASS INDEX (BMI) DOCUMENTED: ICD-10-PCS | Mod: CPTII,S$GLB,, | Performed by: INTERNAL MEDICINE

## 2020-07-15 PROCEDURE — 97802 PR MED NUTR THER, 1ST, INDIV, EA 15 MIN: ICD-10-PCS | Mod: S$GLB,,, | Performed by: INTERNAL MEDICINE

## 2020-07-15 RX ORDER — FAMOTIDINE 20 MG/1
20 TABLET, FILM COATED ORAL EVERY OTHER DAY
COMMUNITY
Start: 2019-04-01

## 2020-07-15 NOTE — PROGRESS NOTES
Subjective:       Patient ID: Waldo English is a 60 y.o. male.    Chief Complaint: No chief complaint on file.    HPI   Pt. Has no significant cardiovascular or pulmonary history.  Patient has a history of hyperlipidemia for which he takes a Statin.      Physical Limitations:  None.     Current exercise routine:Patient currently walks on the treadmill or ellipticals for 10-15 minutes, 3 days a week.  Patient performs upper body and core resistance training exercises, 3 days a week.  Patient does not follow any formal flexibility routine at the current time.     Goals:  Patient would like to maintain his current weight and decrease his body fat % and increase his lean mass.  Fun Facts:  Patient was very friendly and engaged.  Patient expressed some concern about feeling breathless when walking up 9 flights of stairs at work and I explained that this is normal and that he can improve upon it by increasing his aerobic capacity with practicing vigorous intensity cardio exercise.  Patient recently bought a MULUGETA scale and seems very motivated to decrease his body fat mass while increasing his muscle mass.  Patient was receptive to all recommendations made.     Review of Systems      Objective:     The fitness evaluation results are as follows:  D.O.S. 7/15/2020 3/25/2019 3/26/2018 3/27/2017 5/11/2015   Height (in): 70 69.5 70 69.5 70   Weight (lbs): 177.7 178 176 180 177.5   BMI: 25.003766 25.019666 25.710745 26.90887 25.85874   Body Fat (%): 23.80 22.29 23.74 25.28 23.70   Waist (cm): 99 97 90 93 95   Hip (cm): 103 102 100 97 100   WHR: 0.96 0.95 0.90 0.96 0.95   RBP (mmHg): 112/72 122/68 122/78 112/80 98/60   RHR (bpm): 62 74 62 66 72    Strength R (lbs)t: 118.82544 115 120.40647 116 113    Strength Lt (lbs): 100 103.66317 105 91.93984 104.3333   Push-up Assessment: 27 24 27 26 16   Curl-up Assessment: 75 75 61 48 75   Flexibility Testing (cm): 21 22 32 23 16   REE (kcals): 1697 1680 1870 1470 1610       Physical  Exam    Assessment:     Age/gender stratified assessment:  Resting BP: Within Normal Limits   Body Fat %: Very Good   WHR Risk Factor: Moderate Risk    Strength R: Above Average    Strength L: Above Average   Upper Body Endurance: Excellent   Abdominal Endurance: Well Above Average   Lower body Flexibiltiy: Good       1. Routine general medical examination at a health care facility        Plan:       Recommended fitness guidelines:    -150 minutes of moderate intensity aerobic exercise per week or 75 minutes of vigorous intensity aerobic exercise per week.  Add time, days, or intensity to your current routine to reach the above goals.  Incorporate some interval training to increase your heart rate for short periods of time.      -2 to 4 days per week of resistance training for each muscle group.  Add some lower-body resistance training exercises, such as squats, and lunges, into your current routine.  Make sure that you are keeping your body challenged and progressing your exercise routine by increasing sets, repetitions, weight, or switching up the exercise routine, about every 6 -8 weeks or once an exercise starts to feel too easy for you.      -Daily stretching with a hold of at least 30 seconds per muscle group.

## 2020-07-15 NOTE — PROGRESS NOTES
"Nutrition Assessment  Client name:  Waldo English    (Annual  physical)  :  1960  Age:  60 y.o.  Gender:  male    Client states:  That just yesterday he purchased a body impedence scale to monitor his fat mass and has the hope that his wife will use it as well. His wife eats one meal a day and at dinner wants that to be the main meal of heavy foods with large portions and he prefers it to be the opposite as he exercises at home after dinner. After eating the full meal, he has to rest for 30 minutes prior to getting on the treadmill. Additionally, he shares that his wife does the grocery shopping and her interest in health do not mirror his, and prefers that he is not tempted with 5 types of chips, fudge, cookies and ice cream sandwiches. Assuming his part of responsibility, he is aware that he has been stress eating at work with 160  and times are stressful. More frequently of late, he has been indulging in pop tarts from vending machine and how to just eat one, so 2 is the serving. Also his coworker brews coffee in his office of which he drinks 5 cups daily and each trip he may help himself to the jar of 2-3 chocolate miniatures. Admits to eating less than healthy lunches, however clarifies that it is all done with moderation. Will order a 6" Po boy at [employer] and that is accompanied by a root beer. He loves pizza but can moderate the amount and when ordering from [employer] he will purchase a Kid's meal. Summarizes it is harder to follow healthy guidelines at home and does not want to hurt his wife's feelings. He has questions about the advantages of Intermittent fasting and is has the most concern about his HAIC result today. His goal for next visit, is to add muscle mass and lose fat and weigh 177#.     Anthropometrics  Height:  5'10"     Weight:  177.7#  BMI:  25.55  % Body Fat:  23.80    Clinical Signs/Symptoms  N/V/D:  none  Appetite (Good, Fair, or Poor):  good      Past Medical History: " "  Diagnosis Date    BPH (benign prostatic hypertrophy) 2015    Chronic rhinitis 2015    Diverticulosis of large intestine without hemorrhage: see colonoscopy 9/17 3/26/2018    Family history of colon cancer: father age 65 2015    Former smoker 1/2 pack daily for 10 years quit 2015    GERD (gastroesophageal reflux disease) 2015    Hyperlipidemia 2015    Mitral regurgitation 2016    Rhinitis 2015       Past Surgical History:   Procedure Laterality Date    cataract surgery       bilateral       Medications    has a current medication list which includes the following prescription(s): amoxicillin-clavulanate 875-125mg, aspirin, finasteride, fluticasone propionate, omega-3 acid ethyl esters, pravastatin, and rabeprazole.    Vitamins, Minerals, and/or Supplements:  unknown     Food/Medication Interactions:  Reviewed     Food Allergies or Intolerances:  none     Social History    Marital status:    Employment:  [employer]    Social History     Tobacco Use    Smoking status: Former Smoker     Packs/day: 0.50     Years: 20.00     Pack years: 10.00     Quit date: 2000     Years since quittin.2    Smokeless tobacco: Never Used   Substance Use Topics    Alcohol use: Yes     Comment: Rarely, maybe 1-2 drinks a week if that        Lab Reports (*unavailable at time of visit)   Total Cholesterol:  145    Triglycerides:  50  HDL:  51  LDL:  84   Glucose:  99  HbA1c:  5.4*  BP:  112/72     Food History  Breakfast:  Greek yogurt, 1 c. Coffee with vanilla creamer  Mid-morning Snack:  2c. Coffee with vanilla creamer, w/ 2-3 miniatures  Lunch:  Salad + soup, or pb & jelly sandwich on wheat toast, water  Mid-afternoon Snack:  2c. Coffee w/ 2-3 miniatures  Dinner:  Roast beef poboy 6" or veal cutlet, mashed potatoes with gravy, corn, 2 Tbsp strawberries  H.S. Snack:  none  *Fluid intake:  Coffee, water, soda, " ETOH    Exercise History:  Treadmill 3x/wk for 15 - 25 minutes, resistance training 2x/wk    Cultural/Spiritual/Personal Preferences:  None identified    Support System:  Coworkers, friends    State of Change:  Preparation    Barriers to Change:  Wife resistant to eating healthy and decreasing sugar which promotes greater temptation for client    Diagnosis    Nutrition related deficit related to lack of nutrition education as evidenced by client verbalizes inadequate knowledge re: which foods are indicated for losing fat.    Intervention    RMR (Method:  In Body):  1697 kcal  Activity Factor:  1.3  RJ: 2206 - 125 = 2081    Goals:  1.  Client agrees to pass on the chocolate miniatures and pop tarts at work  2.  Substitute healthy choices discussed today  3.  Consider a salad at lunch to balance heavy meal in evening  4.  Increase water intake by 32 oz. Daily goal: 88 oz.  5.  Consider replacing sweetened tea with water or unsweetened flavored water  6.  Less white carbs at dinner - increase whole grains by 50%    Nutrition Education  Reviewed and explained laboratory values and complimented client on improved Triglyceride and HDL values and 1# loss. Calculated daily fluid gaol and encouraged additional 32 oz. Water daily. Explained the relationship of sugar intake to immune system and accumulation of body fat. Suggested to client that he use today's visit as a discussion point with his wife to prepare lighter meals, or that he serve his portions or that he prepare his own salad for dinner or eat salad/healthier meal at lunch. Concluded that he has more control of what is done at work and when he dines out than he does at home. Discussed several ways that sugar remain in his choices, chocolates, tea, cookies, pop tarts, sodas. Reinforced heathy snacks to have available at work as improved substitutes. Answered his question about intermittent fasting as not a solid long term wt. Solution and reminded him per consult  note last year by [employer] that he noticed that he craved sweets with greater regularity, therefore not assisting him with goal achievement.    Patient verbalized understanding of nutrition education and recommendations received.    Handouts Provided  Meal Planning Guide  Restaurant Guide  Eat Fit Shopping List  Eat Fit Maty  Fast Food Guide  Vitamin/Mineral Guide    Monitoring/Evaluation    Monitor the following:  Weight  BMI  % Body Fat  Caloric intake  Labs:  Lipids/HAIC    Follow Up Plan:  Communication with referring healthcare provider is unnecessary at this time as patient presented as part of annual wellness exam.  However, will follow up with patient in 1-2 years.

## 2020-07-15 NOTE — LETTER
July 15, 2020    Waldo Amador  [address] [facility]  [place] [address]             [facility]  [address]  [facility] [address]  Phone: [phone]  Fax: [phone] Dear Mr. English:    Thank you for allowing me to serve you and perform your Executive Health exam on 7/15/2020.  This letter will serve a brief summary of the history, physical findings, and laboratory/studies performed and recommendations at that time.    Reason for Visit: Executive Health Preventive Physical Examination    Past Medical History:   Diagnosis Date    BPH (benign prostatic hypertrophy) 4/29/2015    Chronic rhinitis 4/29/2015    Diverticulosis of large intestine without hemorrhage: see colonoscopy 9/17 3/26/2018    Family history of colon cancer: father age 65 4/29/2015    Former smoker 1/2 pack daily for 10 years quit 2000 4/29/2015    GERD (gastroesophageal reflux disease) 4/29/2015    Hyperlipidemia 4/29/2015    Mitral regurgitation 4/22/2016    Rhinitis 4/29/2015       Past Surgical History:   Procedure Laterality Date    cataract surgery      April then August 2018 bilateral       Family History   Problem Relation Age of Onset    Hyperlipidemia Mother     Heart disease Mother         A fib    Pulmonary fibrosis Mother     Cancer Father 65        colon; brain; liver; bladder; lung    Heart disease Father         porcine valve    Multiple sclerosis Sister     Diabetes Sister     No Known Problems Brother     No Known Problems Sister     No Known Problems Daughter        Social History     Socioeconomic History    Marital status:      Spouse name: Not on file    Number of children: 1    Years of education: Not on file    Highest education level: Not on file   Occupational History    Not on file   Social Needs    Financial resource strain: Not on file    Food insecurity     Worry: Not on file     Inability: Not on file    Transportation needs     Medical: Not on file      Non-medical: Not on file   Tobacco Use    Smoking status: Former Smoker     Packs/day: 0.50     Years: 20.00     Pack years: 10.00     Quit date: 2000     Years since quittin.2    Smokeless tobacco: Never Used   Substance and Sexual Activity    Alcohol use: Yes     Comment: Rarely, maybe 1-2 drinks a week if that    Drug use: No    Sexual activity: Yes     Partners: Female   Lifestyle    Physical activity     Days per week: Not on file     Minutes per session: Not on file    Stress: Only a little   Relationships    Social connections     Talks on phone: Not on file     Gets together: Not on file     Attends Oriental orthodox service: Not on file     Active member of club or organization: Not on file     Attends meetings of clubs or organizations: Not on file     Relationship status: Not on file   Other Topics Concern    Not on file   Social History Narrative    Not on file        Review of patient's allergies indicates:  No Known Allergies      Current Outpatient Medications:     aspirin 81 MG Chew, Take 1 tablet (81 mg total) by mouth once daily., Disp: , Rfl:     famotidine (PEPCID) 20 MG tablet, , Disp: , Rfl:     finasteride (PROSCAR) 5 mg tablet, TAKE 1 TABLET DAILY, Disp: 90 tablet, Rfl: 1    fluticasone propionate (FLONASE) 50 mcg/actuation nasal spray, INHALE 2 SPRAYS INTO EACH NOSTRIL TWICE DAILY, Disp: 32 g, Rfl: 11    pravastatin (PRAVACHOL) 40 MG tablet, TAKE 1 TABLET DAILY, Disp: 90 tablet, Rfl: 4    RABEprazole (ACIPHEX) 20 mg tablet, TAKE 1 TABLET DAILY AS NEEDED, Disp: 90 tablet, Rfl: 3    varicella-zoster gE-AS01B, PF, (SHINGRIX, PF,) 50 mcg/0.5 mL injection, Inject 0.5 mLs into the muscle once. For one dose. for 1 dose, Disp: 0.5 mL, Rfl: 1     Review of Systems  Review of Systems - Negative except for some situational stress of late.  Other symptoms including sleep apnea and acid reflux have been stable.  You have seen your dermatologist.  Your otherwise up-to-date with your  screenings.  I recommended that you touch base with your sleep specialist in case your CPAP needs to be adjusted.    Physical Exam:  General: General appearance: alert, well appearing, and in no distress.   Skin: Skin exam - normal coloration and turgor, no rashes, no suspicious skin lesions noted.  HEENT: Ears - bilateral TM's and external ear canals normal. , ENT exam reveals - ENT exam normal, no neck nodes or sinus tenderness.   Lungs: Chest: clear to auscultation, no wheezes, rales or rhonchi, symmetric air entry.   Heart: CVS exam: normal rate, regular rhythm, normal S1, S2, no murmurs, rubs, clicks or gallops.   Extremities: Exam of extremities: peripheral pulses normal, no pedal edema, no clubbing or cyanosis    Labs:  Results for orders placed or performed in visit on 07/15/20   Comprehensive metabolic panel   Result Value Ref Range    Sodium 141 136 - 145 mmol/L    Potassium 4.5 3.5 - 5.1 mmol/L    Chloride 106 95 - 110 mmol/L    CO2 29 23 - 29 mmol/L    Glucose 99 70 - 110 mg/dL    BUN, Bld 12 6 - 20 mg/dL    Creatinine 1.1 0.5 - 1.4 mg/dL    Calcium 9.3 8.7 - 10.5 mg/dL    Total Protein 7.3 6.0 - 8.4 g/dL    Albumin 3.9 3.5 - 5.2 g/dL    Total Bilirubin 0.8 0.1 - 1.0 mg/dL    Alkaline Phosphatase 66 55 - 135 U/L    AST 21 10 - 40 U/L    ALT 19 10 - 44 U/L    Anion Gap 6 (L) 8 - 16 mmol/L    eGFR if African American >60.0 >60 mL/min/1.73 m^2    eGFR if non African American >60.0 >60 mL/min/1.73 m^2   CBC Without Differential   Result Value Ref Range    WBC 5.90 3.90 - 12.70 K/uL    RBC 4.74 4.60 - 6.20 M/uL    Hemoglobin 14.4 14.0 - 18.0 g/dL    Hematocrit 43.2 40.0 - 54.0 %    Mean Corpuscular Volume 91 82 - 98 fL    Mean Corpuscular Hemoglobin 30.4 27.0 - 31.0 pg    Mean Corpuscular Hemoglobin Conc 33.3 32.0 - 36.0 g/dL    RDW 12.4 11.5 - 14.5 %    Platelets 225 150 - 350 K/uL    MPV 9.9 9.2 - 12.9 fL   Lipid panel   Result Value Ref Range    Cholesterol 145 120 - 199 mg/dL    Triglycerides 50 30 - 150  mg/dL    HDL 51 40 - 75 mg/dL    LDL Cholesterol 84.0 63.0 - 159.0 mg/dL    Hdl/Cholesterol Ratio 35.2 20.0 - 50.0 %    Total Cholesterol/HDL Ratio 2.8 2.0 - 5.0    Non-HDL Cholesterol 94 mg/dL   TSH   Result Value Ref Range    TSH 1.309 0.400 - 4.000 uIU/mL   PSA, Screening (every year)   Result Value Ref Range    PSA, SCREEN 0.25 0.00 - 4.00 ng/mL   Hemoglobin A1c   Result Value Ref Range    Hemoglobin A1C 5.4 4.0 - 5.6 %    Estimated Avg Glucose 108 68 - 131 mg/dL   COVID-19 (SARS CoV-2) IgG Antibody   Result Value Ref Range    COVID-19 (SARS CoV-2) IgG Ab Negative Negative      EKG and chest x-ray are acceptable.    LDL is good, we would like it to be lower than 75 but your close to target range.    Assessment/Recommendations:  Routine Health Maintenance is up-to-date.  I recommended consultation with your sleep specialist for review.  I also recommend the shingles vaccine series of 2.    Please continue with exercise and healthy habits.  We could consider a stress test or cardiac assessment within the next 1-2 years, sooner with any problems.  Please refer to keep up with your dermatology follow-up as well.  If you develop any other symptoms, please let us know.    At this time, you appear to be in good medical condition.  I look forward to seeing you again next year.  Please contact me should you have any questions or concerns regarding physical findings, or my recommendations.        Sincerely,          Elissa Maldonado MD, FACP

## 2020-07-15 NOTE — PROGRESS NOTES
Subjective:       Patient ID: Waldo English is a 60 y.o. male.    Chief Complaint: Executive Health     PE    GERD, seen by Gastro Dr Jensen 2017, apparently acceptable, no Ingram's.  Colonoscopy at same time and due in 2022.    ROSARIO on tx, feels good; due for follow up.  No daytime somnolence.    ANDREW Ramirez annually outside [facility].    Urology 2018, PSA normalized.    Lipids reviewed.  HDL improved.  LDL discussed.  Will consider atorvastatin or rosuvastatin in future; he is close to target range.  Last stress test was 2015.  We discussed considering another stress test in the next year, and or doing coronary CT for stratification.    Some situational stress with work issues and COVID.  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  For while there, he was having some difficulty with severe anxiety and some weight loss but weight has stabilized any he is feeling much better currently.  No SI or HI.  Does not desire any additional intervention.  Sleeping well.    Patient Active Problem List:     Mixed hyperlipidemia     Chronic rhinitis     Benign non-nodular prostatic hyperplasia with lower urinary tract symptoms     Former smoker 1/2 pack daily for 10 years quit 2000     IFG (impaired fasting glucose)     Family history of colon cancer: father age 65     GERD without esophagitis, follows with Dr Jensen, EGD 2017     Non-rheumatic mitral regurgitation: Mitral regurgitation: trivial see ECHO 4/16     Diverticulosis of large intestine without hemorrhage: see colonoscopy 9/17     ROSARIO (obstructive sleep apnea):  see HST 4/18      Review of Systems   Constitutional: Negative.    HENT: Negative for congestion, postnasal drip, rhinorrhea and sinus pressure.    Eyes: Negative for redness and visual disturbance.   Respiratory: Positive for apnea. Negative for choking, shortness of breath and stridor.         Stable   Cardiovascular: Negative for  chest pain, palpitations and leg swelling.   Gastrointestinal: Negative for abdominal pain, constipation, diarrhea and nausea.   Genitourinary: Negative for difficulty urinating, flank pain, frequency, testicular pain and urgency.        BPH minimal  Meds working, does not wake up at night at all   Musculoskeletal: Negative for arthralgias, back pain and myalgias.   Skin: Negative for color change and rash.   Neurological: Negative.    Psychiatric/Behavioral: Negative.         Stress with COVID  Manages 400 people  Had anxiety with all this  Sx improved       Objective:      Physical Exam  Constitutional:       Appearance: He is well-developed.   HENT:      Head: Normocephalic and atraumatic.      Right Ear: External ear normal.      Left Ear: External ear normal.   Eyes:      Conjunctiva/sclera: Conjunctivae normal.      Pupils: Pupils are equal, round, and reactive to light.   Neck:      Musculoskeletal: Normal range of motion and neck supple.      Thyroid: No thyromegaly.   Cardiovascular:      Rate and Rhythm: Normal rate and regular rhythm.      Heart sounds: No murmur.   Pulmonary:      Effort: Pulmonary effort is normal. No respiratory distress.      Breath sounds: Normal breath sounds. No wheezing.   Abdominal:      General: There is no distension.      Palpations: Abdomen is soft.      Tenderness: There is no abdominal tenderness.   Musculoskeletal:         General: No tenderness.   Lymphadenopathy:      Cervical: No cervical adenopathy.   Skin:     General: Skin is warm and dry.   Neurological:      Mental Status: He is alert and oriented to person, place, and time.      Cranial Nerves: No cranial nerve deficit.   Psychiatric:         Behavior: Behavior normal.         Assessment:       1. Annual physical exam    2. Mixed hyperlipidemia    3. Benign non-nodular prostatic hyperplasia with lower urinary tract symptoms    4. GERD without esophagitis, follows with Dr Jensen, EGD 2017    5. ROSARIO (obstructive  sleep apnea):  see HST 4/18        Plan:         Waldo was seen today for [place].    Diagnoses and all orders for this visit:    Annual physical exam    Mixed hyperlipidemia    Benign non-nodular prostatic hyperplasia with lower urinary tract symptoms    GERD without esophagitis, follows with Dr Jensen, EGBUCK 2017    ROSARIO (obstructive sleep apnea):  see HST 4/18  -     Ambulatory referral/consult to Sleep Disorders; Future     LDL goals reviewed   Chest x-ray and EKG acceptable   Continue with exercise and healthy habits   Schedule sleep clinic follow-up   Keep GI follow-up within the next several years is recommended   Cardiac assessment as above, symptoms and evaluation reviewed

## 2020-07-15 NOTE — PATIENT INSTRUCTIONS

## 2021-02-26 ENCOUNTER — PATIENT MESSAGE (OUTPATIENT)
Dept: INTERNAL MEDICINE | Facility: CLINIC | Age: 61
End: 2021-02-26

## 2021-04-20 RX ORDER — PRAVASTATIN SODIUM 40 MG/1
TABLET ORAL
Qty: 90 TABLET | Refills: 0 | Status: SHIPPED | OUTPATIENT
Start: 2021-04-20 | End: 2021-05-19

## 2021-04-21 DIAGNOSIS — Z00.00 ROUTINE GENERAL MEDICAL EXAMINATION AT A HEALTH CARE FACILITY: Primary | ICD-10-CM

## 2021-05-04 ENCOUNTER — PATIENT MESSAGE (OUTPATIENT)
Dept: RESEARCH | Facility: HOSPITAL | Age: 61
End: 2021-05-04

## 2021-05-05 ENCOUNTER — PATIENT MESSAGE (OUTPATIENT)
Dept: SLEEP MEDICINE | Facility: CLINIC | Age: 61
End: 2021-05-05

## 2021-05-15 ENCOUNTER — PATIENT MESSAGE (OUTPATIENT)
Dept: INTERNAL MEDICINE | Facility: CLINIC | Age: 61
End: 2021-05-15

## 2021-05-19 ENCOUNTER — HOSPITAL ENCOUNTER (OUTPATIENT)
Dept: CARDIOLOGY | Facility: HOSPITAL | Age: 61
Discharge: HOME | End: 2021-05-19
Attending: INTERNAL MEDICINE
Payer: COMMERCIAL

## 2021-05-19 ENCOUNTER — CLINICAL SUPPORT (OUTPATIENT)
Dept: INTERNAL MEDICINE | Facility: CLINIC | Age: 61
End: 2021-05-19
Payer: COMMERCIAL

## 2021-05-19 ENCOUNTER — OFFICE VISIT (OUTPATIENT)
Dept: INTERNAL MEDICINE | Facility: CLINIC | Age: 61
End: 2021-05-19
Payer: COMMERCIAL

## 2021-05-19 ENCOUNTER — HOSPITAL ENCOUNTER (OUTPATIENT)
Dept: RADIOLOGY | Facility: HOSPITAL | Age: 61
Discharge: HOME | End: 2021-05-19
Attending: INTERNAL MEDICINE
Payer: COMMERCIAL

## 2021-05-19 ENCOUNTER — CLINICAL SUPPORT (OUTPATIENT)
Dept: INTERNAL MEDICINE | Facility: CLINIC | Age: 61
End: 2021-05-19

## 2021-05-19 VITALS — BODY MASS INDEX: 25.77 KG/M2 | HEIGHT: 70 IN | WEIGHT: 180 LBS

## 2021-05-19 VITALS
SYSTOLIC BLOOD PRESSURE: 125 MMHG | WEIGHT: 176 LBS | DIASTOLIC BLOOD PRESSURE: 80 MMHG | BODY MASS INDEX: 25.2 KG/M2 | HEIGHT: 70 IN

## 2021-05-19 DIAGNOSIS — Z00.00 ROUTINE GENERAL MEDICAL EXAMINATION AT A HEALTH CARE FACILITY: ICD-10-CM

## 2021-05-19 DIAGNOSIS — Z00.00 ROUTINE GENERAL MEDICAL EXAMINATION AT A HEALTH CARE FACILITY: Primary | ICD-10-CM

## 2021-05-19 DIAGNOSIS — I34.0 NON-RHEUMATIC MITRAL REGURGITATION: ICD-10-CM

## 2021-05-19 DIAGNOSIS — E78.2 MIXED HYPERLIPIDEMIA: ICD-10-CM

## 2021-05-19 DIAGNOSIS — R94.39 ABNORMAL STRESS TEST: ICD-10-CM

## 2021-05-19 DIAGNOSIS — Z00.00 ANNUAL PHYSICAL EXAM: Primary | ICD-10-CM

## 2021-05-19 DIAGNOSIS — Q74.0 THUMB ANOMALY: ICD-10-CM

## 2021-05-19 LAB
ABO + RH BLD: NORMAL
ALBUMIN SERPL BCP-MCNC: 3.7 G/DL (ref 3.5–5.2)
ALP SERPL-CCNC: 72 U/L (ref 55–135)
ALT SERPL W/O P-5'-P-CCNC: 19 U/L (ref 10–44)
ANION GAP SERPL CALC-SCNC: 10 MMOL/L (ref 8–16)
AST SERPL-CCNC: 21 U/L (ref 10–40)
BILIRUB SERPL-MCNC: 0.7 MG/DL (ref 0.1–1)
BUN SERPL-MCNC: 14 MG/DL (ref 8–23)
CALCIUM SERPL-MCNC: 9.4 MG/DL (ref 8.7–10.5)
CHLORIDE SERPL-SCNC: 108 MMOL/L (ref 95–110)
CHOLEST SERPL-MCNC: 168 MG/DL (ref 120–199)
CHOLEST/HDLC SERPL: 3.5 {RATIO} (ref 2–5)
CO2 SERPL-SCNC: 23 MMOL/L (ref 23–29)
COMPLEXED PSA SERPL-MCNC: 0.23 NG/ML (ref 0–4)
CREAT SERPL-MCNC: 0.9 MG/DL (ref 0.5–1.4)
CV STRESS BASE HR: 67 BPM
DIASTOLIC BLOOD PRESSURE: 70 MMHG
ERYTHROCYTE [DISTWIDTH] IN BLOOD BY AUTOMATED COUNT: 12.5 % (ref 11.5–14.5)
EST. GFR  (AFRICAN AMERICAN): >60 ML/MIN/1.73 M^2
EST. GFR  (NON AFRICAN AMERICAN): >60 ML/MIN/1.73 M^2
ESTIMATED AVG GLUCOSE: 108 MG/DL (ref 68–131)
GLUCOSE SERPL-MCNC: 97 MG/DL (ref 70–110)
HBA1C MFR BLD: 5.4 % (ref 4–5.6)
HCT VFR BLD AUTO: 43.8 % (ref 40–54)
HDLC SERPL-MCNC: 48 MG/DL (ref 40–75)
HDLC SERPL: 28.6 % (ref 20–50)
HGB BLD-MCNC: 14.7 G/DL (ref 14–18)
LDLC SERPL CALC-MCNC: 105.2 MG/DL (ref 63–159)
MCH RBC QN AUTO: 29.9 PG (ref 27–31)
MCHC RBC AUTO-ENTMCNC: 33.6 G/DL (ref 32–36)
MCV RBC AUTO: 89 FL (ref 82–98)
NONHDLC SERPL-MCNC: 120 MG/DL
OHS CV CPX 1 MINUTE RECOVERY HEART RATE: 137 BPM
OHS CV CPX 85 PERCENT MAX PREDICTED HEART RATE MALE: 135
OHS CV CPX ESTIMATED METS: 13
OHS CV CPX MAX PREDICTED HEART RATE: 159
OHS CV CPX PATIENT IS FEMALE: 0
OHS CV CPX PATIENT IS MALE: 1
OHS CV CPX PEAK DIASTOLIC BLOOD PRESSURE: 74 MMHG
OHS CV CPX PEAK HEAR RATE: 155 BPM
OHS CV CPX PEAK RATE PRESSURE PRODUCT: NORMAL
OHS CV CPX PEAK SYSTOLIC BLOOD PRESSURE: 185 MMHG
OHS CV CPX PERCENT MAX PREDICTED HEART RATE ACHIEVED: 97
OHS CV CPX RATE PRESSURE PRODUCT PRESENTING: 7906
PLATELET # BLD AUTO: 251 K/UL (ref 150–450)
PMV BLD AUTO: 9.7 FL (ref 9.2–12.9)
POTASSIUM SERPL-SCNC: 4.3 MMOL/L (ref 3.5–5.1)
PROT SERPL-MCNC: 7 G/DL (ref 6–8.4)
RBC # BLD AUTO: 4.91 M/UL (ref 4.6–6.2)
SODIUM SERPL-SCNC: 141 MMOL/L (ref 136–145)
STRESS ECHO POST EXERCISE DUR MIN: 8 MINUTES
STRESS ECHO POST EXERCISE DUR SEC: 0 SECONDS
STRESS ST DEPRESSION: 1 MM
SYSTOLIC BLOOD PRESSURE: 118 MMHG
TRIGL SERPL-MCNC: 74 MG/DL (ref 30–150)
TSH SERPL DL<=0.005 MIU/L-ACNC: 1.27 UIU/ML (ref 0.4–4)
WBC # BLD AUTO: 6.28 K/UL (ref 3.9–12.7)

## 2021-05-19 PROCEDURE — 99999 PR PBB SHADOW E&M-EST. PATIENT-LVL I: CPT | Mod: PBBFAC,,,

## 2021-05-19 PROCEDURE — 97802 PR MED NUTR THER, 1ST, INDIV, EA 15 MIN: ICD-10-PCS | Mod: S$GLB,,, | Performed by: INTERNAL MEDICINE

## 2021-05-19 PROCEDURE — 71046 XR CHEST PA AND LATERAL: ICD-10-PCS | Mod: 26,,, | Performed by: RADIOLOGY

## 2021-05-19 PROCEDURE — 99396 PR PREVENTIVE VISIT,EST,40-64: ICD-10-PCS | Mod: S$GLB,,, | Performed by: INTERNAL MEDICINE

## 2021-05-19 PROCEDURE — 71046 X-RAY EXAM CHEST 2 VIEWS: CPT | Mod: 26,,, | Performed by: RADIOLOGY

## 2021-05-19 PROCEDURE — 93016 CV STRESS TEST SUPVJ ONLY: CPT | Mod: ,,, | Performed by: INTERNAL MEDICINE

## 2021-05-19 PROCEDURE — 93016 EXERCISE STRESS - EKG (CUPID ONLY): ICD-10-PCS | Mod: ,,, | Performed by: INTERNAL MEDICINE

## 2021-05-19 PROCEDURE — 71046 X-RAY EXAM CHEST 2 VIEWS: CPT | Mod: TC,FY

## 2021-05-19 PROCEDURE — 3008F PR BODY MASS INDEX (BMI) DOCUMENTED: ICD-10-PCS | Mod: CPTII,S$GLB,, | Performed by: INTERNAL MEDICINE

## 2021-05-19 PROCEDURE — 93018 EXERCISE STRESS - EKG (CUPID ONLY): ICD-10-PCS | Mod: ,,, | Performed by: INTERNAL MEDICINE

## 2021-05-19 PROCEDURE — 99999 PR PBB SHADOW E&M-EST. PATIENT-LVL I: ICD-10-PCS | Mod: PBBFAC,,,

## 2021-05-19 PROCEDURE — 93017 CV STRESS TEST TRACING ONLY: CPT

## 2021-05-19 PROCEDURE — 84153 ASSAY OF PSA TOTAL: CPT | Performed by: INTERNAL MEDICINE

## 2021-05-19 PROCEDURE — 1126F AMNT PAIN NOTED NONE PRSNT: CPT | Mod: S$GLB,,, | Performed by: INTERNAL MEDICINE

## 2021-05-19 PROCEDURE — 80053 COMPREHEN METABOLIC PANEL: CPT | Performed by: INTERNAL MEDICINE

## 2021-05-19 PROCEDURE — 80061 LIPID PANEL: CPT | Performed by: INTERNAL MEDICINE

## 2021-05-19 PROCEDURE — 99999 PR PBB SHADOW E&M-EST. PATIENT-LVL IV: ICD-10-PCS | Mod: PBBFAC,,, | Performed by: INTERNAL MEDICINE

## 2021-05-19 PROCEDURE — 93018 CV STRESS TEST I&R ONLY: CPT | Mod: ,,, | Performed by: INTERNAL MEDICINE

## 2021-05-19 PROCEDURE — 97802 MEDICAL NUTRITION INDIV IN: CPT | Mod: S$GLB,,, | Performed by: INTERNAL MEDICINE

## 2021-05-19 PROCEDURE — 85027 COMPLETE CBC AUTOMATED: CPT | Performed by: INTERNAL MEDICINE

## 2021-05-19 PROCEDURE — 97750 PR PHYSICAL PERFORMANCE TEST: ICD-10-PCS | Mod: S$GLB,,, | Performed by: INTERNAL MEDICINE

## 2021-05-19 PROCEDURE — 99999 PR PBB SHADOW E&M-EST. PATIENT-LVL IV: CPT | Mod: PBBFAC,,, | Performed by: INTERNAL MEDICINE

## 2021-05-19 PROCEDURE — 84443 ASSAY THYROID STIM HORMONE: CPT | Performed by: INTERNAL MEDICINE

## 2021-05-19 PROCEDURE — 83036 HEMOGLOBIN GLYCOSYLATED A1C: CPT | Performed by: INTERNAL MEDICINE

## 2021-05-19 PROCEDURE — 97750 PHYSICAL PERFORMANCE TEST: CPT | Mod: S$GLB,,, | Performed by: INTERNAL MEDICINE

## 2021-05-19 PROCEDURE — 1126F PR PAIN SEVERITY QUANTIFIED, NO PAIN PRESENT: ICD-10-PCS | Mod: S$GLB,,, | Performed by: INTERNAL MEDICINE

## 2021-05-19 PROCEDURE — 3008F BODY MASS INDEX DOCD: CPT | Mod: CPTII,S$GLB,, | Performed by: INTERNAL MEDICINE

## 2021-05-19 PROCEDURE — 86900 BLOOD TYPING SEROLOGIC ABO: CPT | Performed by: INTERNAL MEDICINE

## 2021-05-19 PROCEDURE — 99396 PREV VISIT EST AGE 40-64: CPT | Mod: S$GLB,,, | Performed by: INTERNAL MEDICINE

## 2021-05-19 RX ORDER — ATORVASTATIN CALCIUM 20 MG/1
20 TABLET, FILM COATED ORAL DAILY
Qty: 90 TABLET | Refills: 3 | Status: SHIPPED | OUTPATIENT
Start: 2021-05-19 | End: 2022-04-29

## 2021-05-21 PROBLEM — Q74.0 THUMB ANOMALY: Status: ACTIVE | Noted: 2021-05-21

## 2021-06-25 ENCOUNTER — PATIENT MESSAGE (OUTPATIENT)
Dept: PHARMACY | Facility: CLINIC | Age: 61
End: 2021-06-25

## 2021-08-06 ENCOUNTER — PATIENT MESSAGE (OUTPATIENT)
Dept: INTERNAL MEDICINE | Facility: CLINIC | Age: 61
End: 2021-08-06

## 2021-09-20 ENCOUNTER — LAB VISIT (OUTPATIENT)
Dept: LAB | Facility: HOSPITAL | Age: 61
End: 2021-09-20
Attending: INTERNAL MEDICINE
Payer: COMMERCIAL

## 2021-09-20 ENCOUNTER — HOSPITAL ENCOUNTER (OUTPATIENT)
Dept: CARDIOLOGY | Facility: HOSPITAL | Age: 61
Discharge: HOME | End: 2021-09-20
Attending: INTERNAL MEDICINE
Payer: COMMERCIAL

## 2021-09-20 VITALS — BODY MASS INDEX: 24.77 KG/M2 | HEIGHT: 70 IN | WEIGHT: 173 LBS

## 2021-09-20 DIAGNOSIS — E78.2 MIXED HYPERLIPIDEMIA: ICD-10-CM

## 2021-09-20 DIAGNOSIS — R94.39 ABNORMAL STRESS TEST: ICD-10-CM

## 2021-09-20 LAB
ASCENDING AORTA: 2.71 CM
AST SERPL-CCNC: 21 U/L (ref 10–40)
BSA FOR ECHO PROCEDURE: 1.97 M2
CHOLEST SERPL-MCNC: 128 MG/DL (ref 120–199)
CHOLEST/HDLC SERPL: 2.7 {RATIO} (ref 2–5)
CV ECHO LV RWT: 0.32 CM
CV STRESS BASE HR: 68 BPM
DIASTOLIC BLOOD PRESSURE: 72 MMHG
DOP CALC LVOT AREA: 3 CM2
DOP CALC LVOT DIAMETER: 1.96 CM
DOP CALC LVOT PEAK VEL: 1.52 M/S
DOP CALC LVOT STROKE VOLUME: 78.2 CM3
DOP CALCLVOT PEAK VEL VTI: 25.93 CM
E WAVE DECELERATION TIME: 168.6 MSEC
E/A RATIO: 1.06
E/E' RATIO: 6.96 M/S
ECHO LV POSTERIOR WALL: 0.72 CM (ref 0.6–1.1)
EJECTION FRACTION: 60 %
FRACTIONAL SHORTENING: 36 % (ref 28–44)
HDLC SERPL-MCNC: 47 MG/DL (ref 40–75)
HDLC SERPL: 36.7 % (ref 20–50)
INTERVENTRICULAR SEPTUM: 0.64 CM (ref 0.6–1.1)
IVRT: 99.9 MSEC
LA MAJOR: 4.33 CM
LA MINOR: 4.37 CM
LA WIDTH: 3.68 CM
LDLC SERPL CALC-MCNC: 67.6 MG/DL (ref 63–159)
LEFT ATRIUM SIZE: 3.4 CM
LEFT ATRIUM VOLUME INDEX MOD: 16.8 ML/M2
LEFT ATRIUM VOLUME INDEX: 23.6 ML/M2
LEFT ATRIUM VOLUME MOD: 32.97 CM3
LEFT ATRIUM VOLUME: 46.26 CM3
LEFT INTERNAL DIMENSION IN SYSTOLE: 2.9 CM (ref 2.1–4)
LEFT VENTRICLE DIASTOLIC VOLUME INDEX: 47.31 ML/M2
LEFT VENTRICLE DIASTOLIC VOLUME: 92.73 ML
LEFT VENTRICLE MASS INDEX: 47 G/M2
LEFT VENTRICLE SYSTOLIC VOLUME INDEX: 16.5 ML/M2
LEFT VENTRICLE SYSTOLIC VOLUME: 32.28 ML
LEFT VENTRICULAR INTERNAL DIMENSION IN DIASTOLE: 4.51 CM (ref 3.5–6)
LEFT VENTRICULAR MASS: 92.56 G
LV LATERAL E/E' RATIO: 6.69 M/S
LV SEPTAL E/E' RATIO: 7.25 M/S
MV A" WAVE DURATION": 14.27 MSEC
MV PEAK A VEL: 0.82 M/S
MV PEAK E VEL: 0.87 M/S
MV STENOSIS PRESSURE HALF TIME: 48.89 MS
MV VALVE AREA P 1/2 METHOD: 4.5 CM2
NONHDLC SERPL-MCNC: 81 MG/DL
OHS CV CPX 1 MINUTE RECOVERY HEART RATE: 134 BPM
OHS CV CPX 85 PERCENT MAX PREDICTED HEART RATE MALE: 135
OHS CV CPX ESTIMATED METS: 12
OHS CV CPX MAX PREDICTED HEART RATE: 159
OHS CV CPX PATIENT IS FEMALE: 0
OHS CV CPX PATIENT IS MALE: 1
OHS CV CPX PEAK DIASTOLIC BLOOD PRESSURE: 83 MMHG
OHS CV CPX PEAK HEAR RATE: 160 BPM
OHS CV CPX PEAK RATE PRESSURE PRODUCT: NORMAL
OHS CV CPX PEAK SYSTOLIC BLOOD PRESSURE: 179 MMHG
OHS CV CPX PERCENT MAX PREDICTED HEART RATE ACHIEVED: 101
OHS CV CPX RATE PRESSURE PRODUCT PRESENTING: 8432
PISA TR MAX VEL: 2.63 M/S
PULM VEIN S/D RATIO: 1.32
PV PEAK D VEL: 0.5 M/S
PV PEAK S VEL: 0.66 M/S
RA MAJOR: 4.21 CM
RA PRESSURE: 3 MMHG
RA WIDTH: 2.71 CM
RIGHT VENTRICULAR END-DIASTOLIC DIMENSION: 3.04 CM
RV TISSUE DOPPLER FREE WALL SYSTOLIC VELOCITY 1 (APICAL 4 CHAMBER VIEW): 13.75 CM/S
SINUS: 2.71 CM
STJ: 2.75 CM
STRESS ECHO POST EXERCISE DUR MIN: 7 MINUTES
STRESS ECHO POST EXERCISE DUR SEC: 4 SECONDS
STRESS ST DEPRESSION: 1.5 MM
SYSTOLIC BLOOD PRESSURE: 124 MMHG
TDI LATERAL: 0.13 M/S
TDI SEPTAL: 0.12 M/S
TDI: 0.13 M/S
TR MAX PG: 28 MMHG
TRICUSPID ANNULAR PLANE SYSTOLIC EXCURSION: 2.28 CM
TRIGL SERPL-MCNC: 67 MG/DL (ref 30–150)
TV REST PULMONARY ARTERY PRESSURE: 31 MMHG

## 2021-09-20 PROCEDURE — 93351 STRESS ECHO (CUPID ONLY): ICD-10-PCS | Mod: 26,,, | Performed by: INTERNAL MEDICINE

## 2021-09-20 PROCEDURE — 93351 STRESS TTE COMPLETE: CPT

## 2021-09-20 PROCEDURE — 80061 LIPID PANEL: CPT | Performed by: INTERNAL MEDICINE

## 2021-09-20 PROCEDURE — 93351 STRESS TTE COMPLETE: CPT | Mod: 26,,, | Performed by: INTERNAL MEDICINE

## 2021-09-20 PROCEDURE — 36415 COLL VENOUS BLD VENIPUNCTURE: CPT | Performed by: INTERNAL MEDICINE

## 2021-09-20 PROCEDURE — 84450 TRANSFERASE (AST) (SGOT): CPT | Performed by: INTERNAL MEDICINE

## 2021-11-10 ENCOUNTER — PATIENT MESSAGE (OUTPATIENT)
Dept: INTERNAL MEDICINE | Facility: CLINIC | Age: 61
End: 2021-11-10
Payer: COMMERCIAL

## 2021-11-10 DIAGNOSIS — L98.9 THUMB LESION: Primary | ICD-10-CM

## 2021-12-10 DIAGNOSIS — R22.31 MASS OF RIGHT HAND: Primary | ICD-10-CM

## 2021-12-13 ENCOUNTER — TELEPHONE (OUTPATIENT)
Dept: ORTHOPEDICS | Facility: CLINIC | Age: 61
End: 2021-12-13
Payer: COMMERCIAL

## 2021-12-13 ENCOUNTER — PATIENT MESSAGE (OUTPATIENT)
Dept: ORTHOPEDICS | Facility: CLINIC | Age: 61
End: 2021-12-13
Payer: COMMERCIAL

## 2021-12-13 DIAGNOSIS — R22.30 MASS OF HAND, UNSPECIFIED LATERALITY: Primary | ICD-10-CM

## 2021-12-15 ENCOUNTER — OFFICE VISIT (OUTPATIENT)
Dept: ORTHOPEDICS | Facility: CLINIC | Age: 61
End: 2021-12-15
Payer: COMMERCIAL

## 2021-12-15 VITALS — WEIGHT: 173 LBS | BODY MASS INDEX: 24.77 KG/M2 | HEIGHT: 70 IN

## 2021-12-15 DIAGNOSIS — M67.40 MUCOID CYST OF JOINT: ICD-10-CM

## 2021-12-15 DIAGNOSIS — L98.9 THUMB LESION: ICD-10-CM

## 2021-12-15 DIAGNOSIS — M18.12 PRIMARY OSTEOARTHRITIS OF FIRST CARPOMETACARPAL JOINT OF LEFT HAND: ICD-10-CM

## 2021-12-15 PROCEDURE — 20600 DRAIN/INJ JOINT/BURSA W/O US: CPT | Mod: FA,S$GLB,, | Performed by: ORTHOPAEDIC SURGERY

## 2021-12-15 PROCEDURE — 99204 OFFICE O/P NEW MOD 45 MIN: CPT | Mod: 25,S$GLB,, | Performed by: ORTHOPAEDIC SURGERY

## 2021-12-15 PROCEDURE — 99204 PR OFFICE/OUTPT VISIT, NEW, LEVL IV, 45-59 MIN: ICD-10-PCS | Mod: 25,S$GLB,, | Performed by: ORTHOPAEDIC SURGERY

## 2021-12-15 PROCEDURE — 99999 PR PBB SHADOW E&M-EST. PATIENT-LVL III: ICD-10-PCS | Mod: PBBFAC,,, | Performed by: ORTHOPAEDIC SURGERY

## 2021-12-15 PROCEDURE — 99999 PR PBB SHADOW E&M-EST. PATIENT-LVL III: CPT | Mod: PBBFAC,,, | Performed by: ORTHOPAEDIC SURGERY

## 2021-12-15 PROCEDURE — 20600 PR DRAIN/INJECT SMALL JOINT/BURSA: ICD-10-PCS | Mod: FA,S$GLB,, | Performed by: ORTHOPAEDIC SURGERY

## 2021-12-15 RX ORDER — TRIAMCINOLONE ACETONIDE 40 MG/ML
20 INJECTION, SUSPENSION INTRA-ARTICULAR; INTRAMUSCULAR
Status: COMPLETED | OUTPATIENT
Start: 2021-12-15 | End: 2021-12-15

## 2021-12-15 RX ADMIN — TRIAMCINOLONE ACETONIDE 20 MG: 40 INJECTION, SUSPENSION INTRA-ARTICULAR; INTRAMUSCULAR at 04:12

## 2022-02-02 ENCOUNTER — OFFICE VISIT (OUTPATIENT)
Dept: ORTHOPEDICS | Facility: CLINIC | Age: 62
End: 2022-02-02
Payer: COMMERCIAL

## 2022-02-02 VITALS — WEIGHT: 180 LBS | HEIGHT: 70 IN | BODY MASS INDEX: 25.77 KG/M2

## 2022-02-02 DIAGNOSIS — Q74.0 THUMB ANOMALY: Primary | ICD-10-CM

## 2022-02-02 PROCEDURE — 3008F BODY MASS INDEX DOCD: CPT | Mod: CPTII,S$GLB,, | Performed by: ORTHOPAEDIC SURGERY

## 2022-02-02 PROCEDURE — 99024 POSTOP FOLLOW-UP VISIT: CPT | Mod: S$GLB,,, | Performed by: ORTHOPAEDIC SURGERY

## 2022-02-02 PROCEDURE — 3008F PR BODY MASS INDEX (BMI) DOCUMENTED: ICD-10-PCS | Mod: CPTII,S$GLB,, | Performed by: ORTHOPAEDIC SURGERY

## 2022-02-02 PROCEDURE — 99024 PR POST-OP FOLLOW-UP VISIT: ICD-10-PCS | Mod: S$GLB,,, | Performed by: ORTHOPAEDIC SURGERY

## 2022-02-02 PROCEDURE — 99999 PR PBB SHADOW E&M-EST. PATIENT-LVL III: CPT | Mod: PBBFAC,,, | Performed by: ORTHOPAEDIC SURGERY

## 2022-02-02 PROCEDURE — 1159F PR MEDICATION LIST DOCUMENTED IN MEDICAL RECORD: ICD-10-PCS | Mod: CPTII,S$GLB,, | Performed by: ORTHOPAEDIC SURGERY

## 2022-02-02 PROCEDURE — 99999 PR PBB SHADOW E&M-EST. PATIENT-LVL III: ICD-10-PCS | Mod: PBBFAC,,, | Performed by: ORTHOPAEDIC SURGERY

## 2022-02-02 PROCEDURE — 1159F MED LIST DOCD IN RCRD: CPT | Mod: CPTII,S$GLB,, | Performed by: ORTHOPAEDIC SURGERY

## 2022-02-02 NOTE — PROGRESS NOTES
Subjective:      Patient ID: Waldo English is a 62 y.o. male.  Chief Complaint: Post-op Evaluation (2 wk post op ( Sx 1/19))      HPI  Waldo English is a  62 y.o. male presenting today for post op visit.  He is s/p excision cyst from the right thumb 2 weeks postop doing well.     Review of patient's allergies indicates:  No Known Allergies      Current Outpatient Medications   Medication Sig Dispense Refill    aspirin 81 MG Chew Take 1 tablet (81 mg total) by mouth once daily. (Patient taking differently: Take 81 mg by mouth every other day.)      atorvastatin (LIPITOR) 20 MG tablet Take 1 tablet (20 mg total) by mouth once daily. 90 tablet 3    famotidine (PEPCID) 20 MG tablet Take 20 mg by mouth every other day.      finasteride (PROSCAR) 5 mg tablet TAKE 1 TABLET DAILY 90 tablet 3    fluticasone propionate (FLONASE) 50 mcg/actuation nasal spray INHALE 2 SPRAYS INTO EACH NOSTRIL TWICE DAILY 32 g 11    glucosamine/msm/csa/samantha/zlw863 (GLUCOSAMIN-CHOND-MSM-SAMANTHA-115HC ORAL) Take by mouth.      RABEprazole (ACIPHEX) 20 mg tablet TAKE 1 TABLET DAILY AS NEEDED (Patient taking differently: Take 20 mg by mouth every other day.) 90 tablet 3    HYDROcodone-acetaminophen (NORCO) 5-325 mg per tablet Take 1 tablet by mouth every 4 (four) hours as needed for Pain. (Patient not taking: Reported on 2/2/2022) 12 tablet 0     No current facility-administered medications for this visit.       Past Medical History:   Diagnosis Date    BPH (benign prostatic hypertrophy) 4/29/2015    Chronic rhinitis 4/29/2015    Diverticulosis of large intestine without hemorrhage: see colonoscopy 9/17 3/26/2018    Family history of colon cancer: father age 65 4/29/2015    Former smoker 1/2 pack daily for 10 years quit 2000 4/29/2015    GERD (gastroesophageal reflux disease) 4/29/2015    Hyperlipidemia 4/29/2015    Mitral regurgitation 4/22/2016    Rhinitis 4/29/2015       Past Surgical History:   Procedure Laterality Date    cataract  "surgery      April then August 2018 bilateral    EXCISION OF GANGLION CYST OF HAND Right 1/19/2022    Procedure: EXCISION, GANGLION CYST, HAND;  Surgeon: Lennox Verdin Jr., MD;  Location[facility];  Service: Orthopedics;  Laterality: Right;  thumb       OBJECTIVE:   PHYSICAL EXAM:  Height: 5' 10" (177.8 cm) Weight: 81.6 kg (180 lb)  Vitals:    02/02/22 1452   Weight: 81.6 kg (180 lb)   Height: 5' 10" (1.778 m)   PainSc:   1     Ortho/SPM Exam  Examination right thumb the incision looks good healing well sutures in place no evidence of infection    RADIOGRAPHS:  None  Comments: I have personally reviewed the imaging and I agree with the above radiologist's report.    ASSESSMENT/PLAN:     IMPRESSION:  Status post excision cyst right thumb    PLAN:  Sutures removed instructed in appropriate wound care start gentle range of motion avoid heavy lifting    FOLLOW UP:  3 weeks    Disclaimer: This note has been generated using voice-recognition software. There may be typographical errors that have been missed during proof-reading.    "

## 2022-03-16 ENCOUNTER — TELEPHONE (OUTPATIENT)
Dept: ORTHOPEDICS | Facility: CLINIC | Age: 62
End: 2022-03-16
Payer: COMMERCIAL

## 2022-03-16 ENCOUNTER — OFFICE VISIT (OUTPATIENT)
Dept: ORTHOPEDICS | Facility: CLINIC | Age: 62
End: 2022-03-16
Payer: COMMERCIAL

## 2022-03-16 VITALS — HEIGHT: 70 IN | BODY MASS INDEX: 25.77 KG/M2 | WEIGHT: 180 LBS

## 2022-03-16 DIAGNOSIS — Q74.0 THUMB ANOMALY: Primary | ICD-10-CM

## 2022-03-16 PROCEDURE — 3008F BODY MASS INDEX DOCD: CPT | Mod: CPTII,S$GLB,, | Performed by: ORTHOPAEDIC SURGERY

## 2022-03-16 PROCEDURE — 99024 POSTOP FOLLOW-UP VISIT: CPT | Mod: S$GLB,,, | Performed by: ORTHOPAEDIC SURGERY

## 2022-03-16 PROCEDURE — 99024 PR POST-OP FOLLOW-UP VISIT: ICD-10-PCS | Mod: S$GLB,,, | Performed by: ORTHOPAEDIC SURGERY

## 2022-03-16 PROCEDURE — 1159F MED LIST DOCD IN RCRD: CPT | Mod: CPTII,S$GLB,, | Performed by: ORTHOPAEDIC SURGERY

## 2022-03-16 PROCEDURE — 99999 PR PBB SHADOW E&M-EST. PATIENT-LVL III: ICD-10-PCS | Mod: PBBFAC,,, | Performed by: ORTHOPAEDIC SURGERY

## 2022-03-16 PROCEDURE — 99999 PR PBB SHADOW E&M-EST. PATIENT-LVL III: CPT | Mod: PBBFAC,,, | Performed by: ORTHOPAEDIC SURGERY

## 2022-03-16 PROCEDURE — 3008F PR BODY MASS INDEX (BMI) DOCUMENTED: ICD-10-PCS | Mod: CPTII,S$GLB,, | Performed by: ORTHOPAEDIC SURGERY

## 2022-03-16 PROCEDURE — 1159F PR MEDICATION LIST DOCUMENTED IN MEDICAL RECORD: ICD-10-PCS | Mod: CPTII,S$GLB,, | Performed by: ORTHOPAEDIC SURGERY

## 2022-03-16 NOTE — TELEPHONE ENCOUNTER
Spoke with patient. Patient states he is running about 15 minute late due to traffic. Ok to come in

## 2022-03-16 NOTE — TELEPHONE ENCOUNTER
----- Message from Jyoti Lawler sent at 3/16/2022  3:27 PM CDT -----  Type:  Patient Returning Call    Who Called: Pt     Would the patient rather a call back or a response via [facility]? Call back   Best Call Back Number: [phone]  Additional Information:  pt is in traffic and will be about 10-15 min late

## 2022-03-16 NOTE — PROGRESS NOTES
Subjective:      Patient ID: Waldo English is a 62 y.o. male.  Chief Complaint: Post-op Evaluation (4 week post op right hand surgery-1/19/22.)      HPI  Waldo English is a  62 y.o. male presenting today for post op visit.  He is s/p excision cyst right thumb now about 6 weeks postop  Still having little bit of soreness and swelling pain minimal.     Review of patient's allergies indicates:  No Known Allergies      Current Outpatient Medications   Medication Sig Dispense Refill    aspirin 81 MG Chew Take 1 tablet (81 mg total) by mouth once daily. (Patient taking differently: Take 81 mg by mouth every other day.)      atorvastatin (LIPITOR) 20 MG tablet Take 1 tablet (20 mg total) by mouth once daily. 90 tablet 3    famotidine (PEPCID) 20 MG tablet Take 20 mg by mouth every other day.      finasteride (PROSCAR) 5 mg tablet TAKE 1 TABLET DAILY 90 tablet 3    fluticasone propionate (FLONASE) 50 mcg/actuation nasal spray INHALE 2 SPRAYS INTO EACH NOSTRIL TWICE DAILY 32 g 11    glucosamine/msm/csa/samantha/aab645 (GLUCOSAMIN-CHOND-MSM-SAMANTHA-115HC ORAL) Take by mouth.      RABEprazole (ACIPHEX) 20 mg tablet TAKE 1 TABLET DAILY AS NEEDED (Patient taking differently: Take 20 mg by mouth every other day.) 90 tablet 3    HYDROcodone-acetaminophen (NORCO) 5-325 mg per tablet Take 1 tablet by mouth every 4 (four) hours as needed for Pain. (Patient not taking: No sig reported) 12 tablet 0     No current facility-administered medications for this visit.       Past Medical History:   Diagnosis Date    BPH (benign prostatic hypertrophy) 4/29/2015    Chronic rhinitis 4/29/2015    Diverticulosis of large intestine without hemorrhage: see colonoscopy 9/17 3/26/2018    Family history of colon cancer: father age 65 4/29/2015    Former smoker 1/2 pack daily for 10 years quit 2000 4/29/2015    GERD (gastroesophageal reflux disease) 4/29/2015    Hyperlipidemia 4/29/2015    Mitral regurgitation 4/22/2016    Rhinitis 4/29/2015  "      Past Surgical History:   Procedure Laterality Date    cataract surgery      April then August 2018 bilateral    EXCISION OF GANGLION CYST OF HAND Right 1/19/2022    Procedure: EXCISION, GANGLION CYST, HAND;  Surgeon: Lennox Verdin Jr., MD;  Location: [facility];  Service: Orthopedics;  Laterality: Right;  thumb       OBJECTIVE:   PHYSICAL EXAM:  Height: 5' 10" (177.8 cm) Weight: 81.6 kg (180 lb)  Vitals:    03/16/22 1556   Weight: 81.6 kg (180 lb)   Height: 5' 10" (1.778 m)   PainSc: 0-No pain     Ortho/SPM Exam  Examination right thumb incision well healed but there is an area of swelling and mild tenderness in the area no evidence of infection  Range of motion    RADIOGRAPHS:  Thumb is good no recurrent none  Comments: I have personally reviewed the imaging and I agree with the above radiologist's report.    ASSESSMENT/PLAN:     IMPRESSION:  Status post excision cyst right thumb    PLAN:  I suspect this is inflammation of the thumb so I have ordered some Pennsaid topical anti-inflammatory cream for use t.i.d.  Advil or Motrin by mouth otherwise regular activities    FOLLOW UP:  3-4 weeks    Disclaimer: This note has been generated using voice-recognition software. There may be typographical errors that have been missed during proof-reading.    "

## 2022-04-20 ENCOUNTER — OFFICE VISIT (OUTPATIENT)
Dept: ORTHOPEDICS | Facility: CLINIC | Age: 62
End: 2022-04-20
Payer: COMMERCIAL

## 2022-04-20 VITALS — WEIGHT: 180 LBS | HEIGHT: 70 IN | BODY MASS INDEX: 25.77 KG/M2

## 2022-04-20 DIAGNOSIS — M67.40 MUCOID CYST OF JOINT: Primary | ICD-10-CM

## 2022-04-20 PROCEDURE — 99024 PR POST-OP FOLLOW-UP VISIT: ICD-10-PCS | Mod: S$GLB,,, | Performed by: ORTHOPAEDIC SURGERY

## 2022-04-20 PROCEDURE — 1159F PR MEDICATION LIST DOCUMENTED IN MEDICAL RECORD: ICD-10-PCS | Mod: CPTII,S$GLB,, | Performed by: ORTHOPAEDIC SURGERY

## 2022-04-20 PROCEDURE — 99024 POSTOP FOLLOW-UP VISIT: CPT | Mod: S$GLB,,, | Performed by: ORTHOPAEDIC SURGERY

## 2022-04-20 PROCEDURE — 99999 PR PBB SHADOW E&M-EST. PATIENT-LVL III: CPT | Mod: PBBFAC,,, | Performed by: ORTHOPAEDIC SURGERY

## 2022-04-20 PROCEDURE — 99999 PR PBB SHADOW E&M-EST. PATIENT-LVL III: ICD-10-PCS | Mod: PBBFAC,,, | Performed by: ORTHOPAEDIC SURGERY

## 2022-04-20 PROCEDURE — 3008F BODY MASS INDEX DOCD: CPT | Mod: CPTII,S$GLB,, | Performed by: ORTHOPAEDIC SURGERY

## 2022-04-20 PROCEDURE — 3008F PR BODY MASS INDEX (BMI) DOCUMENTED: ICD-10-PCS | Mod: CPTII,S$GLB,, | Performed by: ORTHOPAEDIC SURGERY

## 2022-04-20 PROCEDURE — 1159F MED LIST DOCD IN RCRD: CPT | Mod: CPTII,S$GLB,, | Performed by: ORTHOPAEDIC SURGERY

## 2022-04-20 RX ORDER — DICLOFENAC SODIUM 20 MG/G
SOLUTION TOPICAL
COMMUNITY
Start: 2022-03-24 | End: 2022-06-01

## 2022-04-20 NOTE — PROGRESS NOTES
Subjective:      Patient ID: Waldo English is a 62 y.o. male.  Chief Complaint: Post-op Evaluation (Right thumb cyst removal ( Sx ))      HPI  Waldo English is a  62 y.o. male presenting today for post op visit.  He is s/p excision mucous cyst from the right thumb  He is now about 3 months postop still having little bit of soreness and swelling of the thumb joint but the Pennsaid seems to be working  He is able to use the thumb for most normal activities without difficulty.     Review of patient's allergies indicates:  No Known Allergies      Current Outpatient Medications   Medication Sig Dispense Refill    aspirin 81 MG Chew Take 1 tablet (81 mg total) by mouth once daily. (Patient taking differently: Take 81 mg by mouth every other day.)      atorvastatin (LIPITOR) 20 MG tablet Take 1 tablet (20 mg total) by mouth once daily. 90 tablet 3    famotidine (PEPCID) 20 MG tablet Take 20 mg by mouth every other day.      finasteride (PROSCAR) 5 mg tablet TAKE 1 TABLET DAILY 90 tablet 3    fluticasone propionate (FLONASE) 50 mcg/actuation nasal spray INHALE 2 SPRAYS INTO EACH NOSTRIL TWICE DAILY 32 g 11    glucosamine/msm/csa/samantha/dmt425 (GLUCOSAMIN-CHOND-MSM-SAMANTHA-115HC ORAL) Take by mouth.      HYDROcodone-acetaminophen (NORCO) 5-325 mg per tablet Take 1 tablet by mouth every 4 (four) hours as needed for Pain. 12 tablet 0    PENNSAID 20 mg/gram /actuation(2 %) sopm SMARTSI Pump Topical Twice Daily      RABEprazole (ACIPHEX) 20 mg tablet TAKE 1 TABLET DAILY AS NEEDED (Patient taking differently: Take 20 mg by mouth every other day.) 90 tablet 3     No current facility-administered medications for this visit.       Past Medical History:   Diagnosis Date    BPH (benign prostatic hypertrophy) 2015    Chronic rhinitis 2015    Diverticulosis of large intestine without hemorrhage: see colonoscopy 9/17 3/26/2018    Family history of colon cancer: father age 65 2015    Former smoker 1/2 pack daily  "for 10 years quit 2000 4/29/2015    GERD (gastroesophageal reflux disease) 4/29/2015    Hyperlipidemia 4/29/2015    Mitral regurgitation 4/22/2016    Rhinitis 4/29/2015       Past Surgical History:   Procedure Laterality Date    cataract surgery      April then August 2018 bilateral    EXCISION OF GANGLION CYST OF HAND Right 1/19/2022    Procedure: EXCISION, GANGLION CYST, HAND;  Surgeon: Lennox Verdin Jr., MD;  Location: [facility];  Service: Orthopedics;  Laterality: Right;  thumb       OBJECTIVE:   PHYSICAL EXAM:  Height: 5' 10" (177.8 cm) Weight: 81.6 kg (180 lb)  Vitals:    04/20/22 1539   Weight: 81.6 kg (180 lb)   Height: 5' 10" (1.778 m)   PainSc: 0-No pain     Ortho/SPM Exam  Examination right thumb the incision is well healed just a small bony prominence on the radial side at the base of the distal phalanx slightly tender no evidence of infection range of motion full    RADIOGRAPHS:  None  Comments: I have personally reviewed the imaging and I agree with the above radiologist's report.    ASSESSMENT/PLAN:     IMPRESSION:  Status post excision mucous cyst right thumb    PLAN:  I think this will get better with time so I recommended that he continue with the Pennsaid maybe some Advil or Motrin by mouth otherwise regular activities    FOLLOW UP:  4-6 weeks    Disclaimer: This note has been generated using voice-recognition software. There may be typographical errors that have been missed during proof-reading.    "

## 2022-04-29 ENCOUNTER — TELEPHONE (OUTPATIENT)
Dept: INTERNAL MEDICINE | Facility: CLINIC | Age: 62
End: 2022-04-29
Payer: COMMERCIAL

## 2022-04-29 RX ORDER — ATORVASTATIN CALCIUM 20 MG/1
TABLET, FILM COATED ORAL
Qty: 90 TABLET | Refills: 0 | Status: SHIPPED | OUTPATIENT
Start: 2022-04-29 | End: 2022-06-01 | Stop reason: SDUPTHER

## 2022-04-29 NOTE — TELEPHONE ENCOUNTER
Refill Routing Note   Medication(s) are not appropriate for processing by [facility] for the following reason(s):      - Patient has been seen in the ED/Hospital since the last PCP visit    [facility] action(s):  Route Medication-related problems identified:   Requires labs  Requires appointment        Medication reconciliation completed: No     Appointments  past 12m or future 3m with PCP    Date Provider   Last Visit   5/19/2021 Elissa Maldonado MD   Next Visit   Visit date not found Elissa Maldonado MD   ED visits in past 90 days: 0        Note composed:7:25 AM 04/29/2022

## 2022-04-29 NOTE — TELEPHONE ENCOUNTER
I did refill his medication one time, but he needs to see me in the next 2-3 months.  [facility] to let [facility] know he is due to see me soon, thank you

## 2022-04-29 NOTE — TELEPHONE ENCOUNTER
Care Due:                  Date            Visit Type   Department     Provider  --------------------------------------------------------------------------------                                ADMIT                               REVIEW EXEC   [facility] INTERNAL  Last Visit: 05-      CHECK        MEDICINE       Elissa Maldonado  Next Visit: None Scheduled  None         None Found                                                            Last  Test          Frequency    Reason                     Performed    Due Date  --------------------------------------------------------------------------------    Office Visit  12 months..  RABEprazole,               05- 05-                             atorvastatin, finasteride    CMP.........  12 months..  atorvastatin.............  05- 05-    Powered by Language Learning Class [employer]. Reference number: [id number].   4/29/2022 12:30:35 AM CDT

## 2022-05-02 DIAGNOSIS — Z00.00 ROUTINE GENERAL MEDICAL EXAMINATION AT A HEALTH CARE FACILITY: Primary | ICD-10-CM

## 2022-06-01 ENCOUNTER — CLINICAL SUPPORT (OUTPATIENT)
Dept: INTERNAL MEDICINE | Facility: CLINIC | Age: 62
End: 2022-06-01
Payer: COMMERCIAL

## 2022-06-01 ENCOUNTER — CLINICAL SUPPORT (OUTPATIENT)
Dept: INTERNAL MEDICINE | Facility: CLINIC | Age: 62
End: 2022-06-01

## 2022-06-01 ENCOUNTER — HOSPITAL ENCOUNTER (OUTPATIENT)
Dept: CARDIOLOGY | Facility: CLINIC | Age: 62
Discharge: HOME | End: 2022-06-01
Payer: COMMERCIAL

## 2022-06-01 ENCOUNTER — HOSPITAL ENCOUNTER (OUTPATIENT)
Dept: RADIOLOGY | Facility: HOSPITAL | Age: 62
Discharge: HOME | End: 2022-06-01
Attending: INTERNAL MEDICINE
Payer: COMMERCIAL

## 2022-06-01 ENCOUNTER — OFFICE VISIT (OUTPATIENT)
Dept: INTERNAL MEDICINE | Facility: CLINIC | Age: 62
End: 2022-06-01
Payer: COMMERCIAL

## 2022-06-01 VITALS
WEIGHT: 176 LBS | BODY MASS INDEX: 25.2 KG/M2 | SYSTOLIC BLOOD PRESSURE: 118 MMHG | HEIGHT: 70 IN | DIASTOLIC BLOOD PRESSURE: 60 MMHG

## 2022-06-01 DIAGNOSIS — Z00.00 ROUTINE GENERAL MEDICAL EXAMINATION AT A HEALTH CARE FACILITY: Primary | ICD-10-CM

## 2022-06-01 DIAGNOSIS — Z87.891 FORMER SMOKER: ICD-10-CM

## 2022-06-01 DIAGNOSIS — Z00.00 ROUTINE GENERAL MEDICAL EXAMINATION AT A HEALTH CARE FACILITY: ICD-10-CM

## 2022-06-01 DIAGNOSIS — E78.2 MIXED HYPERLIPIDEMIA: ICD-10-CM

## 2022-06-01 DIAGNOSIS — J31.0 CHRONIC RHINITIS: ICD-10-CM

## 2022-06-01 DIAGNOSIS — Z00.00 ANNUAL PHYSICAL EXAM: Primary | ICD-10-CM

## 2022-06-01 DIAGNOSIS — G47.33 OSA (OBSTRUCTIVE SLEEP APNEA): ICD-10-CM

## 2022-06-01 DIAGNOSIS — R73.01 IFG (IMPAIRED FASTING GLUCOSE): ICD-10-CM

## 2022-06-01 LAB
ALBUMIN SERPL BCP-MCNC: 3.7 G/DL (ref 3.5–5.2)
ALP SERPL-CCNC: 72 U/L (ref 55–135)
ALT SERPL W/O P-5'-P-CCNC: 17 U/L (ref 10–44)
ANION GAP SERPL CALC-SCNC: 6 MMOL/L (ref 8–16)
AST SERPL-CCNC: 21 U/L (ref 10–40)
BILIRUB SERPL-MCNC: 0.8 MG/DL (ref 0.1–1)
BUN SERPL-MCNC: 14 MG/DL (ref 8–23)
CALCIUM SERPL-MCNC: 9 MG/DL (ref 8.7–10.5)
CHLORIDE SERPL-SCNC: 106 MMOL/L (ref 95–110)
CHOLEST SERPL-MCNC: 108 MG/DL (ref 120–199)
CHOLEST/HDLC SERPL: 2.2 {RATIO} (ref 2–5)
CO2 SERPL-SCNC: 27 MMOL/L (ref 23–29)
COMPLEXED PSA SERPL-MCNC: 0.26 NG/ML (ref 0–4)
CREAT SERPL-MCNC: 0.9 MG/DL (ref 0.5–1.4)
ERYTHROCYTE [DISTWIDTH] IN BLOOD BY AUTOMATED COUNT: 12.2 % (ref 11.5–14.5)
EST. GFR  (AFRICAN AMERICAN): >60 ML/MIN/1.73 M^2
EST. GFR  (NON AFRICAN AMERICAN): >60 ML/MIN/1.73 M^2
ESTIMATED AVG GLUCOSE: 108 MG/DL (ref 68–131)
GLUCOSE SERPL-MCNC: 102 MG/DL (ref 70–110)
HBA1C MFR BLD: 5.4 % (ref 4–5.6)
HCT VFR BLD AUTO: 41.6 % (ref 40–54)
HDLC SERPL-MCNC: 49 MG/DL (ref 40–75)
HDLC SERPL: 45.4 % (ref 20–50)
HGB BLD-MCNC: 14.2 G/DL (ref 14–18)
LDLC SERPL CALC-MCNC: 52.4 MG/DL (ref 63–159)
MCH RBC QN AUTO: 30.5 PG (ref 27–31)
MCHC RBC AUTO-ENTMCNC: 34.1 G/DL (ref 32–36)
MCV RBC AUTO: 89 FL (ref 82–98)
NONHDLC SERPL-MCNC: 59 MG/DL
PLATELET # BLD AUTO: 250 K/UL (ref 150–450)
PMV BLD AUTO: 10.1 FL (ref 9.2–12.9)
POTASSIUM SERPL-SCNC: 4.3 MMOL/L (ref 3.5–5.1)
PROT SERPL-MCNC: 6.8 G/DL (ref 6–8.4)
RBC # BLD AUTO: 4.66 M/UL (ref 4.6–6.2)
SODIUM SERPL-SCNC: 139 MMOL/L (ref 136–145)
TRIGL SERPL-MCNC: 33 MG/DL (ref 30–150)
TSH SERPL DL<=0.005 MIU/L-ACNC: 1.8 UIU/ML (ref 0.4–4)
WBC # BLD AUTO: 7.09 K/UL (ref 3.9–12.7)

## 2022-06-01 PROCEDURE — 1160F RVW MEDS BY RX/DR IN RCRD: CPT | Mod: CPTII,S$GLB,, | Performed by: INTERNAL MEDICINE

## 2022-06-01 PROCEDURE — 99999 PR PBB SHADOW E&M-EST. PATIENT-LVL I: CPT | Mod: PBBFAC,,,

## 2022-06-01 PROCEDURE — 93010 EKG 12-LEAD: ICD-10-PCS | Mod: S$GLB,,, | Performed by: INTERNAL MEDICINE

## 2022-06-01 PROCEDURE — 3008F PR BODY MASS INDEX (BMI) DOCUMENTED: ICD-10-PCS | Mod: CPTII,S$GLB,, | Performed by: INTERNAL MEDICINE

## 2022-06-01 PROCEDURE — 71046 XR CHEST PA AND LATERAL: ICD-10-PCS | Mod: 26,,, | Performed by: RADIOLOGY

## 2022-06-01 PROCEDURE — 99999 PR PBB SHADOW E&M-EST. PATIENT-LVL III: CPT | Mod: PBBFAC,,, | Performed by: INTERNAL MEDICINE

## 2022-06-01 PROCEDURE — 1160F PR REVIEW ALL MEDS BY PRESCRIBER/CLIN PHARMACIST DOCUMENTED: ICD-10-PCS | Mod: CPTII,S$GLB,, | Performed by: INTERNAL MEDICINE

## 2022-06-01 PROCEDURE — 1159F PR MEDICATION LIST DOCUMENTED IN MEDICAL RECORD: ICD-10-PCS | Mod: CPTII,S$GLB,, | Performed by: INTERNAL MEDICINE

## 2022-06-01 PROCEDURE — 93005 EKG 12-LEAD: ICD-10-PCS | Mod: S$GLB,,, | Performed by: INTERNAL MEDICINE

## 2022-06-01 PROCEDURE — 84443 ASSAY THYROID STIM HORMONE: CPT | Performed by: INTERNAL MEDICINE

## 2022-06-01 PROCEDURE — 97802 PR MED NUTR THER, 1ST, INDIV, EA 15 MIN: ICD-10-PCS | Mod: S$GLB,,, | Performed by: INTERNAL MEDICINE

## 2022-06-01 PROCEDURE — 3074F SYST BP LT 130 MM HG: CPT | Mod: CPTII,S$GLB,, | Performed by: INTERNAL MEDICINE

## 2022-06-01 PROCEDURE — 97802 MEDICAL NUTRITION INDIV IN: CPT | Mod: S$GLB,,, | Performed by: INTERNAL MEDICINE

## 2022-06-01 PROCEDURE — 97750 PHYSICAL PERFORMANCE TEST: CPT | Mod: S$GLB,,, | Performed by: INTERNAL MEDICINE

## 2022-06-01 PROCEDURE — 83036 HEMOGLOBIN GLYCOSYLATED A1C: CPT | Performed by: INTERNAL MEDICINE

## 2022-06-01 PROCEDURE — 3078F PR MOST RECENT DIASTOLIC BLOOD PRESSURE < 80 MM HG: ICD-10-PCS | Mod: CPTII,S$GLB,, | Performed by: INTERNAL MEDICINE

## 2022-06-01 PROCEDURE — 99999 PR PBB SHADOW E&M-EST. PATIENT-LVL I: ICD-10-PCS | Mod: PBBFAC,,,

## 2022-06-01 PROCEDURE — 3044F PR MOST RECENT HEMOGLOBIN A1C LEVEL <7.0%: ICD-10-PCS | Mod: CPTII,S$GLB,, | Performed by: INTERNAL MEDICINE

## 2022-06-01 PROCEDURE — 93005 ELECTROCARDIOGRAM TRACING: CPT | Mod: S$GLB,,, | Performed by: INTERNAL MEDICINE

## 2022-06-01 PROCEDURE — 99396 PREV VISIT EST AGE 40-64: CPT | Mod: S$GLB,,, | Performed by: INTERNAL MEDICINE

## 2022-06-01 PROCEDURE — 99999 PR PBB SHADOW E&M-EST. PATIENT-LVL III: ICD-10-PCS | Mod: PBBFAC,,, | Performed by: INTERNAL MEDICINE

## 2022-06-01 PROCEDURE — 84153 ASSAY OF PSA TOTAL: CPT | Performed by: INTERNAL MEDICINE

## 2022-06-01 PROCEDURE — 93010 ELECTROCARDIOGRAM REPORT: CPT | Mod: S$GLB,,, | Performed by: INTERNAL MEDICINE

## 2022-06-01 PROCEDURE — 3078F DIAST BP <80 MM HG: CPT | Mod: CPTII,S$GLB,, | Performed by: INTERNAL MEDICINE

## 2022-06-01 PROCEDURE — 71046 X-RAY EXAM CHEST 2 VIEWS: CPT | Mod: TC,FY

## 2022-06-01 PROCEDURE — 3074F PR MOST RECENT SYSTOLIC BLOOD PRESSURE < 130 MM HG: ICD-10-PCS | Mod: CPTII,S$GLB,, | Performed by: INTERNAL MEDICINE

## 2022-06-01 PROCEDURE — 97750 PR PHYSICAL PERFORMANCE TEST: ICD-10-PCS | Mod: S$GLB,,, | Performed by: INTERNAL MEDICINE

## 2022-06-01 PROCEDURE — 80053 COMPREHEN METABOLIC PANEL: CPT | Performed by: INTERNAL MEDICINE

## 2022-06-01 PROCEDURE — 99396 PR PREVENTIVE VISIT,EST,40-64: ICD-10-PCS | Mod: S$GLB,,, | Performed by: INTERNAL MEDICINE

## 2022-06-01 PROCEDURE — 71046 X-RAY EXAM CHEST 2 VIEWS: CPT | Mod: 26,,, | Performed by: RADIOLOGY

## 2022-06-01 PROCEDURE — 80061 LIPID PANEL: CPT | Performed by: INTERNAL MEDICINE

## 2022-06-01 PROCEDURE — 85027 COMPLETE CBC AUTOMATED: CPT | Performed by: INTERNAL MEDICINE

## 2022-06-01 PROCEDURE — 1159F MED LIST DOCD IN RCRD: CPT | Mod: CPTII,S$GLB,, | Performed by: INTERNAL MEDICINE

## 2022-06-01 PROCEDURE — 3008F BODY MASS INDEX DOCD: CPT | Mod: CPTII,S$GLB,, | Performed by: INTERNAL MEDICINE

## 2022-06-01 PROCEDURE — 3044F HG A1C LEVEL LT 7.0%: CPT | Mod: CPTII,S$GLB,, | Performed by: INTERNAL MEDICINE

## 2022-06-01 RX ORDER — FLUTICASONE PROPIONATE 50 MCG
SPRAY, SUSPENSION (ML) NASAL
Qty: 48 G | Refills: 3 | Status: SHIPPED | OUTPATIENT
Start: 2022-06-01

## 2022-06-01 RX ORDER — ATORVASTATIN CALCIUM 20 MG/1
20 TABLET, FILM COATED ORAL DAILY
Qty: 90 TABLET | Refills: 3 | Status: SHIPPED | OUTPATIENT
Start: 2022-06-01 | End: 2024-02-07 | Stop reason: SDUPTHER

## 2022-06-01 RX ORDER — NAPROXEN SODIUM 220 MG/1
81 TABLET, FILM COATED ORAL DAILY
Start: 2022-06-01

## 2022-06-01 RX ORDER — RABEPRAZOLE SODIUM 20 MG/1
20 TABLET, DELAYED RELEASE ORAL EVERY OTHER DAY
Qty: 30 TABLET | Refills: 6
Start: 2022-06-01 | End: 2022-12-01

## 2022-06-01 RX ORDER — FINASTERIDE 5 MG/1
5 TABLET, FILM COATED ORAL DAILY
Qty: 90 TABLET | Refills: 3 | Status: SHIPPED | OUTPATIENT
Start: 2022-06-01

## 2022-06-01 NOTE — PROGRESS NOTES
Subjective:       Patient ID: Waldo English is a 62 y.o. male.    Chief Complaint: [employer]     PE    CXR acceptable today.    EKG acceptable today.    Labs also wnl.    UTD with [facility]; due for tetanus, shingles vaccines and COVID booster.  Will get EGD and colonoscopy this year, Dr Jensen at  will do these.    GERD well controlled on regimen; alternates H2 blocker with PPI.    Stress echo acceptable 2021 (EKG portion positibe but echo component negative).  Able to climb stairs and gets winded but no chest pain or pressure.  Rarely gets some fluttering- may get once every 2 weeks.  Last time he got this it was a few days ago while sitting, lasted about 5 seconds.  He is very active much of the day.  Seen in Cardiology 2015 told no concerns.    Not using CPAP.  He does not feel more tired off his tx.    Patient Active Problem List:     Mixed hyperlipidemia     Chronic rhinitis     Benign non-nodular prostatic hyperplasia with lower urinary tract symptoms     Former smoker 1/2 pack daily for 10 years quit 2000     IFG (impaired fasting glucose)     Family history of colon cancer: father age 65     GERD without esophagitis, follows with Dr Jensen, EGD 2017     Non-rheumatic mitral regurgitation: Mitral regurgitation: trivial see ECHO 4/16     Diverticulosis of large intestine without hemorrhage: see colonoscopy 9/17     ROSARIO (obstructive sleep apnea):  see HST 4/18     Thumb anomaly- cyst     Primary osteoarthritis of first carpometacarpal joint of left hand     Mucoid cyst of joint      Review of Systems   Constitutional: Negative.    HENT: Negative for congestion, postnasal drip, rhinorrhea and sinus pressure.         Had a root canal and R sided facial sx resolved   Eyes: Negative for redness.        Increase in floaters  Posterior vitreous detachment  Lutein recommended  Prior cataract surgery- secondary cataract and had another laser capsulotomy   Respiratory: Negative for apnea, choking, shortness of  breath and stridor.         Not sure about apnea, does snore.  Off his tx, does not fel more tired   Cardiovascular: Negative for chest pain, palpitations and leg swelling.   Gastrointestinal: Negative for abdominal pain, constipation, diarrhea and nausea.   Genitourinary: Negative for difficulty urinating, flank pain, frequency, testicular pain and urgency.   Musculoskeletal: Negative for arthralgias, back pain and myalgias.        Thumb cyst- seeing Ortho   Skin: Negative for color change and rash.   Neurological: Negative.    Psychiatric/Behavioral: Negative.        Objective:      Physical Exam  Constitutional:       Appearance: He is well-developed.   HENT:      Head: Normocephalic and atraumatic.      Right Ear: External ear normal.      Left Ear: External ear normal.   Eyes:      Extraocular Movements: Extraocular movements intact.      Conjunctiva/sclera: Conjunctivae normal.   Neck:      Thyroid: No thyromegaly.   Cardiovascular:      Rate and Rhythm: Normal rate and regular rhythm.      Heart sounds: No murmur heard.  Pulmonary:      Effort: Pulmonary effort is normal. No respiratory distress.      Breath sounds: Normal breath sounds. No wheezing.   Abdominal:      General: There is no distension.      Palpations: Abdomen is soft.      Tenderness: There is no abdominal tenderness.   Musculoskeletal:         General: No tenderness.      Cervical back: Normal range of motion and neck supple.      Right lower leg: No edema.      Left lower leg: No edema.   Lymphadenopathy:      Cervical: No cervical adenopathy.   Skin:     General: Skin is warm and dry.   Neurological:      General: No focal deficit present.      Mental Status: He is alert and oriented to person, place, and time.      Cranial Nerves: No cranial nerve deficit.   Psychiatric:         Mood and Affect: Mood normal.         Behavior: Behavior normal.         Thought Content: Thought content normal.         Judgment: Judgment normal.          Assessment:       1. Annual physical exam    2. Mixed hyperlipidemia    3. IFG (impaired fasting glucose)    4. ROSARIO (obstructive sleep apnea):  see HST 4/18    5. Former smoker 1/2 pack daily for 10 years quit 2000    6. Chronic rhinitis        Plan:         [facility]    Diagnoses and all orders for this visit:    Annual physical exam  -     aspirin 81 MG Chew; Take 1 tablet (81 mg total) by mouth once daily.    Mixed hyperlipidemia:  Continue regimen    IFG (impaired fasting glucose); continue with exercise and healthy lifestyle    ROSARIO (obstructive sleep apnea):  see HST 4/18:  Not on treatment currently, feels well.  Alarm symptoms reviewed, he is having none    Former smoker 1/2 pack daily for 10 years quit 2000    Chronic rhinitis  -     fluticasone propionate (FLONASE) 50 mcg/actuation nasal spray; INHALE 2 SPRAYS INTO EACH NOSTRIL TWICE DAILY    Other orders  -     RABEprazole (ACIPHEX) 20 mg tablet; Take 1 tablet (20 mg total) by mouth every other day.  -     atorvastatin (LIPITOR) 20 MG tablet; Take 1 tablet (20 mg total) by mouth once daily.  -     finasteride (PROSCAR) 5 mg tablet; Take 1 tablet (5 mg total) by mouth once daily.    Tdap today  Shingles vaccine # 1 today  He will consider COVID booster at a later point  Schedule EGD and colonoscopy, he does with [facility]  Annual follow-up, return sooner with problems in the interim

## 2022-06-01 NOTE — PROGRESS NOTES
"Nutrition Assessment  Session Time:  60 minutes  (Annual  physical)    Client name:  Waldo English  :  1960  Age:  62 y.o.  Gender:  male    Client states:  Prior to this visit, he read his Nutrition Note from last year and states not much has changed. His did well with fitness evaluation pertaining to his core, however not as well with aerobic component as his treadmill has been in the garage and blocked by paint cans as his home was damaged in [place]. His exercise time was 10 pm, however he made a conscious decision to get more restorative sleep in trade of exercise and has been successful. He has an interest in returning to the treadmill. Continues to monitor his body fat on home scale and it is 24% and that is acceptable to him. Mentions that he loves carbs, however monitors quantity that he eats and now fills up faster. Office snacks consist of almonds, edamame and gingersnaps and the more dangerous ones are in the coffee area where the chocolates and donuts are located. His staff supports the "crazy foods". He is drinking less cokes and omits the night snack of cereal and milk and drinks water as replacement and does not drink caffeine after 4 pm. He approaches his life with moderation in all aspects and since last year continues to consume oatmeal for breakfast 3-5 workdays. He and his wife differ in their eating habits and she eats one meal a day and at dinner wants that to be the main meal of heavy foods and carb rich  with large portions and he prefers it to be the opposite as he prefers to exercise at home after dinner. He picks up dinner 2-3x/wk and he shares that his wife does the grocery shopping and her interest in health do not mirror his and this makes it more difficult for him to practice good discipline with tempting snacks. Due to experiencing muscle aches due to the Pravastatin last year, Dr. Maldonado changed him to Lipitor  and he is pleased with his lipid panel results. Questions the " "known benefits of herbal supplements and if regulation are in the future, and what time of day is best to take supplements. His goal by next visit is to re-start exercise at home in garage on the treadmill.    Anthropometrics  Height:  5'10"     Weight:  175.8#  BMI:  25.27  % Body Fat:  24.20    Clinical Signs/Symptoms  N/V/D:  none  Appetite:  good       Past Medical History:   Diagnosis Date    BPH (benign prostatic hypertrophy) 2015    Chronic rhinitis 2015    Diverticulosis of large intestine without hemorrhage: see colonoscopy 9/17 3/26/2018    Family history of colon cancer: father age 65 2015    Former smoker 1/2 pack daily for 10 years quit 2015    GERD (gastroesophageal reflux disease) 2015    Hyperlipidemia 2015    Mitral regurgitation 2016    Rhinitis 2015       Past Surgical History:   Procedure Laterality Date    cataract surgery      April then 2018 bilateral    EXCISION OF GANGLION CYST OF HAND Right 2022    Procedure: EXCISION, GANGLION CYST, HAND;  Surgeon: Lennox Verdin Jr., MD;  Location: [facility];  Service: Orthopedics;  Laterality: Right;  thumb       Medications    has a current medication list which includes the following prescription(s): aspirin, atorvastatin, famotidine, finasteride, fluticasone propionate, glucosamine/msm/csa/samantha/qld166, hydrocodone-acetaminophen, pennsaid, and rabeprazole.    Vitamins, Minerals, and/or Supplements:  Leutin, Glucosamine Chondroitin, Ashwagandha, Tribulus, Raspberry ketones, Men's MVI     Food/Medication Interactions:  Reviewed     Food Allergies or Intolerances:  GERD - fired food, Italian foods     Social History    Marital status:    Employment:  [employer]    Social History     Tobacco Use    Smoking status: Former Smoker     Packs/day: 0.50     Years: 20.00     Pack years: 10.00     Quit date: 2000     Years since quittin.1    Smokeless tobacco: " Never Used   Substance Use Topics    Alcohol use: Yes     Comment: Rarely, maybe 1-2 drinks a week if that        Lab Reports   Sodium   Date Value Ref Range Status   06/01/2022 139 136 - 145 mmol/L Final     Potassium   Date Value Ref Range Status   06/01/2022 4.3 3.5 - 5.1 mmol/L Final     Chloride   Date Value Ref Range Status   06/01/2022 106 95 - 110 mmol/L Final     CO2   Date Value Ref Range Status   06/01/2022 27 23 - 29 mmol/L Final     Glucose   Date Value Ref Range Status   06/01/2022 102 70 - 110 mg/dL Final     BUN   Date Value Ref Range Status   06/01/2022 14 8 - 23 mg/dL Final     Creatinine   Date Value Ref Range Status   06/01/2022 0.9 0.5 - 1.4 mg/dL Final     Calcium   Date Value Ref Range Status   06/01/2022 9.0 8.7 - 10.5 mg/dL Final     Total Protein   Date Value Ref Range Status   06/01/2022 6.8 6.0 - 8.4 g/dL Final     Albumin   Date Value Ref Range Status   06/01/2022 3.7 3.5 - 5.2 g/dL Final     Total Bilirubin   Date Value Ref Range Status   06/01/2022 0.8 0.1 - 1.0 mg/dL Final     Comment:     For infants and newborns, interpretation of results should be based  on gestational age, weight and in agreement with clinical  observations.    Premature Infant recommended reference ranges:  Up to 24 hours.............<8.0 mg/dL  Up to 48 hours............<12.0 mg/dL  3-5 days..................<15.0 mg/dL  6-29 days.................<15.0 mg/dL       Alkaline Phosphatase   Date Value Ref Range Status   06/01/2022 72 55 - 135 U/L Final     AST   Date Value Ref Range Status   06/01/2022 21 10 - 40 U/L Final     ALT   Date Value Ref Range Status   06/01/2022 17 10 - 44 U/L Final     Anion Gap   Date Value Ref Range Status   06/01/2022 6 (L) 8 - 16 mmol/L Final     eGFR if    Date Value Ref Range Status   06/01/2022 >60.0 >60 mL/min/1.73 m^2 Final     eGFR if non    Date Value Ref Range Status   06/01/2022 >60.0 >60 mL/min/1.73 m^2 Final     Comment:     Calculation  used to obtain the estimated glomerular filtration  rate (eGFR) is the CKD-EPI equation.         Lab Results   Component Value Date    WBC 7.09 06/01/2022    HGB 14.2 06/01/2022    HCT 41.6 06/01/2022    MCV 89 06/01/2022     06/01/2022        Lab Results   Component Value Date    CHOL 108 (L) 06/01/2022     Lab Results   Component Value Date    HDL 49 06/01/2022     Lab Results   Component Value Date    LDLCALC 52.4 (L) 06/01/2022     Lab Results   Component Value Date    TRIG 33 06/01/2022     Lab Results   Component Value Date    CHOLHDL 45.4 06/01/2022     Lab Results   Component Value Date    HGBA1C 5.4 06/01/2022     BP Readings from Last 1 Encounters:   01/19/22 (!) 164/87       Food History  Breakfast:  Oatmeal, 3 c.coffee with fr, vanilla creamer  Mid-morning Snack:  1 tsp. Peanut butter, 1 c. Coffee with creamer  Lunch:  Cream based soup, cup fruit or club sandwich or 1/3 large salad, water   Mid-afternoon Snack:  2 c. Coffee with creamer  Dinner:  1 cannoli, Italian bread, salad, spumoni, peach praline gelato, water  H.S. Snack:  water  *Fluid intake:  Coffee, water, ETOH    Exercise History:  Push ups daily am + pm    Cultural/Spiritual/Personal Preferences:  None identified    Support System:  friends and co-workers    State of Change:  Preparation    Barriers to Change:  Self efficacy and discipline to re-commit to exercise    Diagnosis    Food and nutrition related knowledge deficit related to inadequate nutrition education as evidenced by client verbalizes what time of day is best to take supplements?    Intervention    RMR (Method:  InBody):  1675 kcal  Activity Factor:  1.4    RJ: 2345 - 250 = 2095 kcal    Goals:  1.  Reclaim access to treadmill in garage and use in am prior to work, 150 minutes per week. Continue with core exercises.    2.  Consider taking Ashwagandha 300 mg twice daily, once in am and once in pm  3.  Consider adding protein at breakfast - gave examples, possibly PB2 in  oatmeal  4.  Consider Trial of sun butter to replace peanut butter      Nutrition Education  The following education was provided to the patient:  Complimented patient on proactive role in health maintenance.  Complimented patient on dietary compliance/modifications and resulting health improvements.  Discussed weight management.  Discussed Heart Healthy Eating.  Suggested dietary modifications based on current dietary behaviors and individual food preferences.  Discussed physical activity guidelines and its associated benefits.  Discussed nutrition-related lab values and dietary and/or lifestyle factors affecting them.  Discussed vitamin/mineral recommendations (may include but not limited to MVI, Vitamin D, Calcium, and/or Iron) and associated food sources.  Discussed importance of small behavior/habit changes in improving long-term adherence and sustainability.  Discussed goal setting.  Provided ongoing support, encouragement, and guidance toward improved health efforts.  Answered all questions regarding supplements.    Patient verbalized understanding of nutrition education and recommendations received.    Handouts Provided  Meal Planning Guide  Restaurant Guide  Eat Fit Shopping List  Eat Fit Maty  Fueling Well On-The-Go  Vitamin/Mineral Guide    Monitoring/Evaluation    Monitor the following:  Weight  BMI  % Body Fat  Caloric intake  Labs: Lipids/HAIC    Follow Up Plan:  Communication with referring healthcare provider is unnecessary at this time as patient presented as part of annual wellness exam.  However, will follow up with patient in 1-2 years.

## 2022-06-01 NOTE — LETTER
June 1, 2022    Waldohaim Hicksmerissa  [address]  [place] [address]             [facility] Moreno [facility]  [address]  [place] [address]  Phone: [phone]  Fax: [phone] Dear Mr. English:    Thank you for allowing me to serve you and perform your Executive Health exam on 6/1/2022.  This letter will serve a brief summary of the history, physical findings, and laboratory/studies performed and recommendations at that time.    Reason for Visit: Executive Health Preventive Physical Examination    Past Medical History:   Diagnosis Date    BPH (benign prostatic hypertrophy) 4/29/2015    Chronic rhinitis 4/29/2015    Diverticulosis of large intestine without hemorrhage: see colonoscopy 9/17 3/26/2018    Family history of colon cancer: father age 65 4/29/2015    Former smoker 1/2 pack daily for 10 years quit 2000 4/29/2015    GERD (gastroesophageal reflux disease) 4/29/2015    Hyperlipidemia 4/29/2015    Mitral regurgitation 4/22/2016    Rhinitis 4/29/2015       Past Surgical History:   Procedure Laterality Date    cataract surgery      April then August 2018 bilateral    EXCISION OF GANGLION CYST OF HAND Right 1/19/2022    Procedure: EXCISION, GANGLION CYST, HAND;  Surgeon: Lennox Verdin Jr., MD;  Location: [facility];  Service: Orthopedics;  Laterality: Right;  thumb       Family History   Problem Relation Age of Onset    Hyperlipidemia Mother     Heart disease Mother         A fib    Pulmonary fibrosis Mother     Cancer Father 65        colon; brain; liver; bladder; lung    Heart disease Father         porcine valve    Multiple sclerosis Sister     Diabetes Sister     No Known Problems Brother     No Known Problems Sister     No Known Problems Daughter        Social History     Socioeconomic History    Marital status:     Number of children: 1   Tobacco Use    Smoking status: Former Smoker     Packs/day: 0.50     Years: 20.00     Pack years: 10.00     Quit date:  2000     Years since quittin.1    Smokeless tobacco: Never Used   Substance and Sexual Activity    Alcohol use: Yes     Comment: Rarely, maybe 1-2 drinks a week if that    Drug use: No    Sexual activity: Yes     Partners: Female        Review of patient's allergies indicates:  No Known Allergies      Current Outpatient Medications:     aspirin 81 MG Chew, Take 1 tablet (81 mg total) by mouth once daily., Disp: , Rfl:     atorvastatin (LIPITOR) 20 MG tablet, Take 1 tablet (20 mg total) by mouth once daily., Disp: 90 tablet, Rfl: 3    famotidine (PEPCID) 20 MG tablet, Take 20 mg by mouth every other day., Disp: , Rfl:     finasteride (PROSCAR) 5 mg tablet, Take 1 tablet (5 mg total) by mouth once daily., Disp: 90 tablet, Rfl: 3    fluticasone propionate (FLONASE) 50 mcg/actuation nasal spray, INHALE 2 SPRAYS INTO EACH NOSTRIL TWICE DAILY, Disp: 48 g, Rfl: 3    glucosamine/msm/csa/samantha/[id number] (GLUCOSAMIN-CHOND-MSM-SAMANTHA-115HC ORAL), Take by mouth., Disp: , Rfl:     RABEprazole (ACIPHEX) 20 mg tablet, Take 1 tablet (20 mg total) by mouth every other day., Disp: 30 tablet, Rfl: 6     Review of Systems  Review of Systems - Negative except for some vision issues and a root canal.  GERD symptoms are stable.  You are staying active    Physical Exam:  General: General appearance: alert, well appearing, and in no distress.   Skin: Skin exam - normal coloration and turgor, no rashes, no suspicious skin lesions noted.  HEENT: Ears - bilateral TM's and external ear canals normal. , ENT exam reveals - ENT exam normal, no neck nodes or sinus tenderness.   Lungs: Chest: clear to auscultation, no wheezes, rales or rhonchi, symmetric air entry.   Heart: CVS exam: normal rate, regular rhythm, normal S1, S2, no murmurs, rubs, clicks or gallops.   Extremities: Exam of extremities: peripheral pulses normal, no pedal edema, no clubbing or cyanosis    Labs:  Results for orders placed or performed in visit on 22    Comprehensive metabolic panel   Result Value Ref Range    Sodium 139 136 - 145 mmol/L    Potassium 4.3 3.5 - 5.1 mmol/L    Chloride 106 95 - 110 mmol/L    CO2 27 23 - 29 mmol/L    Glucose 102 70 - 110 mg/dL    BUN 14 8 - 23 mg/dL    Creatinine 0.9 0.5 - 1.4 mg/dL    Calcium 9.0 8.7 - 10.5 mg/dL    Total Protein 6.8 6.0 - 8.4 g/dL    Albumin 3.7 3.5 - 5.2 g/dL    Total Bilirubin 0.8 0.1 - 1.0 mg/dL    Alkaline Phosphatase 72 55 - 135 U/L    AST 21 10 - 40 U/L    ALT 17 10 - 44 U/L    Anion Gap 6 (L) 8 - 16 mmol/L    eGFR if African American >60.0 >60 mL/min/1.73 m^2    eGFR if non African American >60.0 >60 mL/min/1.73 m^2   CBC Without Differential   Result Value Ref Range    WBC 7.09 3.90 - 12.70 K/uL    RBC 4.66 4.60 - 6.20 M/uL    Hemoglobin 14.2 14.0 - 18.0 g/dL    Hematocrit 41.6 40.0 - 54.0 %    MCV 89 82 - 98 fL    MCH 30.5 27.0 - 31.0 pg    MCHC 34.1 32.0 - 36.0 g/dL    RDW 12.2 11.5 - 14.5 %    Platelets 250 150 - 450 K/uL    MPV 10.1 9.2 - 12.9 fL   Lipid panel   Result Value Ref Range    Cholesterol 108 (L) 120 - 199 mg/dL    Triglycerides 33 30 - 150 mg/dL    HDL 49 40 - 75 mg/dL    LDL Cholesterol 52.4 (L) 63.0 - 159.0 mg/dL    HDL/Cholesterol Ratio 45.4 20.0 - 50.0 %    Total Cholesterol/HDL Ratio 2.2 2.0 - 5.0    Non-HDL Cholesterol 59 mg/dL   TSH   Result Value Ref Range    TSH 1.799 0.400 - 4.000 uIU/mL   PSA, Screening (every year)   Result Value Ref Range    PSA, Screen 0.26 0.00 - 4.00 ng/mL   Hemoglobin A1c   Result Value Ref Range    Hemoglobin A1C 5.4 4.0 - 5.6 %    Estimated Avg Glucose 108 68 - 131 mg/dL      EKG and chest x-ray were acceptable.    Assessment/Recommendations:  Routine Health Maintenance:  I recommend a tetanus diphtheria pertussis booster and the shingles vaccine which is a series of 2. You could consider a COVID booster as well.    Later this year, you will be due for an endoscopy and colonoscopy which you have done at [facility].    At this time, you appear to be in  good medical condition.  I look forward to seeing you again next year.  Please continue with your healthy eating , exercise and lifestyle habits and let me know if you develop any additional or new symptoms.   Don't hesitate to contact me should you have any questions or concerns regarding physical findings, or my recommendations.        Sincerely,            Elissa Maldonado MD, FACP

## 2022-06-01 NOTE — PROGRESS NOTES
Subjective:       Patient ID: Waldo English is a 62 y.o. male.    Chief Complaint: No chief complaint on file.    HPI   Pt. Has no significant cardiovascular or pulmonary history.  Patient has a history of hyperlipidemia for which he takes a Statin.      Physical Limitations:  Patient denied any limitations to physical activity.      Current exercise routine:  Patient currently walks for 15 minutes, 5 days a week.  Patient practices upper body and core resistance training exercises, 2-3 days a week.  Patient stretches an average of 5 days a week.    Goals:  Patient would like to better his body composition by reducing his fat mass and increasing his lean muscle mass.    Fun Facts:  Patient was very friendly and engaged.  Patient noted that he has not been walking on his treadmill or ellipticaling because he got a new drum set that it sitting on his treadmill and blocking the elliptical.  He still keeps active working on his house which sustained damage from [place].  Patient noted that he started practicing a farmer's carry in his garage, 1-2 days a week.  Patient seems motivated to try to increase his muscle mass and was receptive to all recommendations made.     Review of Systems      Objective:     The fitness evaluation results are as follows:  D.O.S. 6/1/2022 5/19/2021 7/15/2020 3/25/2019 3/26/2018 3/27/2017 5/11/2015   Height (in): 70 70 70 69.5 70 69.5 70   Weight (lbs): 175.8 174.6 177.7 178 176 180 177.5   BMI: 25.122686 25.107637 25.702896 25.31783 25.54718 26.80257 25.67013   Body Fat (%): 24.20 23.40 23.80 22.29 23.74 25.28 23.7   Waist (cm): 95 95 99 97 90 93 95   Hip (cm): 101 100 103 102 100 97 100   WHR: 0.94 0.95 0.96 0.95 0.90 0.896597 0.95   RBP (mmHg): 126/64 120/60 112/72 122/68 122/78 112/80 98/60   RHR (bpm): 66 70 62 74 62 66 72    Strength Dominant (Kg): 123.2 115 118.39798 475 952.8559 116 113    Strength Non Dominant (Kg): 105.6 113.86255 487 498.2686 105 91.39308 104.3333   Push-up  Assessment: 19 30 27 24 27 26 16   Curl-up Assessment: 75 75 75 75 61 48 75   Flexibility Testing (cm): 21 24 21 22 32 23 16   REE (kcals): 1675 1685 1697 1680 1870 1470 1610       Physical Exam    Assessment:     Age/gender stratified assessment:  Resting BP: Within Normal Limits   Body Fat %: Good   WHR Risk Factor: Low Risk    Strength Dominant: 90th    Strength Non Dominant: 75th    Upper Body Endurance: Excellent   Abdominal Endurance: Well Above Average   Lower body Flexibiltiy: Good       Problem List Items Addressed This Visit    None     Visit Diagnoses     Routine general medical examination at a health care facility    -  Primary          Plan:       Recommended fitness guidelines:    -150 minutes of moderate intensity aerobic exercise per week or 75 minutes of vigorous intensity aerobic exercise per week.  Start to incorporate more vigorous intensity aerobic exercise into your routine.      -2 to 4 days per week of resistance training for each muscle group.  Practice some lower body resistance training exercises, such as squats and lunges, 2 days a week.      -Daily stretching with a hold of at least 30 seconds per muscle group.

## 2022-06-15 ENCOUNTER — OFFICE VISIT (OUTPATIENT)
Dept: ORTHOPEDICS | Facility: CLINIC | Age: 62
End: 2022-06-15
Payer: COMMERCIAL

## 2022-06-15 VITALS — BODY MASS INDEX: 25.2 KG/M2 | WEIGHT: 176 LBS | HEIGHT: 70 IN

## 2022-06-15 DIAGNOSIS — M67.40 MUCOID CYST OF JOINT: Primary | ICD-10-CM

## 2022-06-15 PROCEDURE — 99213 PR OFFICE/OUTPT VISIT, EST, LEVL III, 20-29 MIN: ICD-10-PCS | Mod: S$GLB,,, | Performed by: ORTHOPAEDIC SURGERY

## 2022-06-15 PROCEDURE — 1159F MED LIST DOCD IN RCRD: CPT | Mod: CPTII,S$GLB,, | Performed by: ORTHOPAEDIC SURGERY

## 2022-06-15 PROCEDURE — 3044F HG A1C LEVEL LT 7.0%: CPT | Mod: CPTII,S$GLB,, | Performed by: ORTHOPAEDIC SURGERY

## 2022-06-15 PROCEDURE — 99999 PR PBB SHADOW E&M-EST. PATIENT-LVL III: ICD-10-PCS | Mod: PBBFAC,,, | Performed by: ORTHOPAEDIC SURGERY

## 2022-06-15 PROCEDURE — 1159F PR MEDICATION LIST DOCUMENTED IN MEDICAL RECORD: ICD-10-PCS | Mod: CPTII,S$GLB,, | Performed by: ORTHOPAEDIC SURGERY

## 2022-06-15 PROCEDURE — 99999 PR PBB SHADOW E&M-EST. PATIENT-LVL III: CPT | Mod: PBBFAC,,, | Performed by: ORTHOPAEDIC SURGERY

## 2022-06-15 PROCEDURE — 99213 OFFICE O/P EST LOW 20 MIN: CPT | Mod: S$GLB,,, | Performed by: ORTHOPAEDIC SURGERY

## 2022-06-15 PROCEDURE — 3008F PR BODY MASS INDEX (BMI) DOCUMENTED: ICD-10-PCS | Mod: CPTII,S$GLB,, | Performed by: ORTHOPAEDIC SURGERY

## 2022-06-15 PROCEDURE — 3008F BODY MASS INDEX DOCD: CPT | Mod: CPTII,S$GLB,, | Performed by: ORTHOPAEDIC SURGERY

## 2022-06-15 PROCEDURE — 3044F PR MOST RECENT HEMOGLOBIN A1C LEVEL <7.0%: ICD-10-PCS | Mod: CPTII,S$GLB,, | Performed by: ORTHOPAEDIC SURGERY

## 2022-06-15 NOTE — PROGRESS NOTES
Subjective:      Patient ID: Waldo English is a 62 y.o. male.  Chief Complaint: Follow-up (Right thumb cyst)      HPI  Waldo English is a  62 y.o. male presenting today for follow up of excision mucous cyst from the right thumb.  He reports that he is still in a little bit of soreness but improving with the Pennsaid he would like to continue this.    Review of patient's allergies indicates:  No Known Allergies      Current Outpatient Medications   Medication Sig Dispense Refill    aspirin 81 MG Chew Take 1 tablet (81 mg total) by mouth once daily.      atorvastatin (LIPITOR) 20 MG tablet Take 1 tablet (20 mg total) by mouth once daily. 90 tablet 3    famotidine (PEPCID) 20 MG tablet Take 20 mg by mouth every other day.      finasteride (PROSCAR) 5 mg tablet Take 1 tablet (5 mg total) by mouth once daily. 90 tablet 3    fluticasone propionate (FLONASE) 50 mcg/actuation nasal spray INHALE 2 SPRAYS INTO EACH NOSTRIL TWICE DAILY 48 g 3    glucosamine/msm/csa/samantha/[id number] (GLUCOSAMIN-CHOND-MSM-SAMANTHA-115HC ORAL) Take by mouth.      RABEprazole (ACIPHEX) 20 mg tablet Take 1 tablet (20 mg total) by mouth every other day. 30 tablet 6     No current facility-administered medications for this visit.       Past Medical History:   Diagnosis Date    BPH (benign prostatic hypertrophy) 4/29/2015    Chronic rhinitis 4/29/2015    Diverticulosis of large intestine without hemorrhage: see colonoscopy 9/17 3/26/2018    Family history of colon cancer: father age 65 4/29/2015    Former smoker 1/2 pack daily for 10 years quit 2000 4/29/2015    GERD (gastroesophageal reflux disease) 4/29/2015    Hyperlipidemia 4/29/2015    Mitral regurgitation 4/22/2016    Rhinitis 4/29/2015       Past Surgical History:   Procedure Laterality Date    cataract surgery      April then August 2018 bilateral    EXCISION OF GANGLION CYST OF HAND Right 1/19/2022    Procedure: EXCISION, GANGLION CYST, HAND;  Surgeon: Lennox Verdin Jr., MD;   "Location: [facility] OR;  Service: Orthopedics;  Laterality: Right;  thumb       OBJECTIVE:   PHYSICAL EXAM:  Height: 5' 10" (177.8 cm) Weight: 79.8 kg (176 lb)  Vitals:    06/15/22 1544   Weight: 79.8 kg (176 lb)   Height: 5' 10" (1.778 m)   PainSc: 0-No pain     Ortho/SPM Exam  Examination right thumb mild swelling minimal tenderness no recurrence of the cyst range of motion full    RADIOGRAPHS:  None  Comments: I have personally reviewed the imaging and I agree with the above radiologist's report.    ASSESSMENT/PLAN:     IMPRESSION:  Status excision cyst right thumb    PLAN:  Continue Pennsaid otherwise regular activities    FOLLOW UP:  1- 2 months or as needed    Disclaimer: This note has been generated using voice-recognition software. There may be typographical errors that have been missed during proof-reading.    "

## 2022-11-02 ENCOUNTER — OFFICE VISIT (OUTPATIENT)
Dept: ORTHOPEDICS | Facility: CLINIC | Age: 62
End: 2022-11-02
Payer: COMMERCIAL

## 2022-11-02 VITALS — BODY MASS INDEX: 24.91 KG/M2 | WEIGHT: 174 LBS | HEIGHT: 70 IN

## 2022-11-02 DIAGNOSIS — M67.40 MUCOID CYST OF JOINT: Primary | ICD-10-CM

## 2022-11-02 PROCEDURE — 99999 PR PBB SHADOW E&M-EST. PATIENT-LVL III: ICD-10-PCS | Mod: PBBFAC,,, | Performed by: ORTHOPAEDIC SURGERY

## 2022-11-02 PROCEDURE — 99213 PR OFFICE/OUTPT VISIT, EST, LEVL III, 20-29 MIN: ICD-10-PCS | Mod: 25,S$GLB,, | Performed by: ORTHOPAEDIC SURGERY

## 2022-11-02 PROCEDURE — 20600 DRAIN/INJ JOINT/BURSA W/O US: CPT | Mod: RT,S$GLB,, | Performed by: ORTHOPAEDIC SURGERY

## 2022-11-02 PROCEDURE — 20600 PR DRAIN/INJECT SMALL JOINT/BURSA: ICD-10-PCS | Mod: RT,S$GLB,, | Performed by: ORTHOPAEDIC SURGERY

## 2022-11-02 PROCEDURE — 3044F PR MOST RECENT HEMOGLOBIN A1C LEVEL <7.0%: ICD-10-PCS | Mod: CPTII,S$GLB,, | Performed by: ORTHOPAEDIC SURGERY

## 2022-11-02 PROCEDURE — 1159F PR MEDICATION LIST DOCUMENTED IN MEDICAL RECORD: ICD-10-PCS | Mod: CPTII,S$GLB,, | Performed by: ORTHOPAEDIC SURGERY

## 2022-11-02 PROCEDURE — 99213 OFFICE O/P EST LOW 20 MIN: CPT | Mod: 25,S$GLB,, | Performed by: ORTHOPAEDIC SURGERY

## 2022-11-02 PROCEDURE — 3008F PR BODY MASS INDEX (BMI) DOCUMENTED: ICD-10-PCS | Mod: CPTII,S$GLB,, | Performed by: ORTHOPAEDIC SURGERY

## 2022-11-02 PROCEDURE — 3044F HG A1C LEVEL LT 7.0%: CPT | Mod: CPTII,S$GLB,, | Performed by: ORTHOPAEDIC SURGERY

## 2022-11-02 PROCEDURE — 99999 PR PBB SHADOW E&M-EST. PATIENT-LVL III: CPT | Mod: PBBFAC,,, | Performed by: ORTHOPAEDIC SURGERY

## 2022-11-02 PROCEDURE — 1159F MED LIST DOCD IN RCRD: CPT | Mod: CPTII,S$GLB,, | Performed by: ORTHOPAEDIC SURGERY

## 2022-11-02 PROCEDURE — 3008F BODY MASS INDEX DOCD: CPT | Mod: CPTII,S$GLB,, | Performed by: ORTHOPAEDIC SURGERY

## 2022-11-02 RX ORDER — TRIAMCINOLONE ACETONIDE 40 MG/ML
20 INJECTION, SUSPENSION INTRA-ARTICULAR; INTRAMUSCULAR
Status: COMPLETED | OUTPATIENT
Start: 2022-11-02 | End: 2022-11-02

## 2022-11-02 RX ADMIN — TRIAMCINOLONE ACETONIDE 20 MG: 40 INJECTION, SUSPENSION INTRA-ARTICULAR; INTRAMUSCULAR at 04:11

## 2022-11-02 NOTE — PROGRESS NOTES
Subjective:      Patient ID: Waldo English is a 62 y.o. male.  Chief Complaint: Follow-up (R Thumb )      HPI  Waldo English is a  62 y.o. male presenting today for follow up of excision mucous cyst of the right thumb.  He reports that he is now about 9 months postop he has done well and really has not had a recurrence of the cyst he does have little bit of swelling and redness from time to time that bothers him possibly related to inflammation  We did try the Pennsaid but it really did not seem to help too much for him.    Review of patient's allergies indicates:  No Known Allergies      Current Outpatient Medications   Medication Sig Dispense Refill    aspirin 81 MG Chew Take 1 tablet (81 mg total) by mouth once daily.      atorvastatin (LIPITOR) 20 MG tablet Take 1 tablet (20 mg total) by mouth once daily. 90 tablet 3    famotidine (PEPCID) 20 MG tablet Take 20 mg by mouth every other day.      finasteride (PROSCAR) 5 mg tablet Take 1 tablet (5 mg total) by mouth once daily. 90 tablet 3    fluticasone propionate (FLONASE) 50 mcg/actuation nasal spray INHALE 2 SPRAYS INTO EACH NOSTRIL TWICE DAILY 48 g 3    glucosamine/msm/csa/samantha/bke090 (GLUCOSAMIN-CHOND-MSM-SAMANTHA-115HC ORAL) Take by mouth.      RABEprazole (ACIPHEX) 20 mg tablet Take 1 tablet (20 mg total) by mouth every other day. 30 tablet 6     No current facility-administered medications for this visit.       Past Medical History:   Diagnosis Date    BPH (benign prostatic hypertrophy) 4/29/2015    Chronic rhinitis 4/29/2015    Diverticulosis of large intestine without hemorrhage: see colonoscopy 9/17 3/26/2018    Family history of colon cancer: father age 65 4/29/2015    Former smoker 1/2 pack daily for 10 years quit 2000 4/29/2015    GERD (gastroesophageal reflux disease) 4/29/2015    Hyperlipidemia 4/29/2015    Mitral regurgitation 4/22/2016    Rhinitis 4/29/2015       Past Surgical History:   Procedure Laterality Date    cataract surgery      April then August  "2018 bilateral    EXCISION OF GANGLION CYST OF HAND Right 1/19/2022    Procedure: EXCISION, GANGLION CYST, HAND;  Surgeon: Lennox Verdin Jr., MD;  Location: [facility];  Service: Orthopedics;  Laterality: Right;  thumb       OBJECTIVE:   PHYSICAL EXAM:  Height: 5' 10" (177.8 cm) Weight: 78.9 kg (174 lb)  Vitals:    11/02/22 1602   Weight: 78.9 kg (174 lb)   Height: 5' 10" (1.778 m)   PainSc: 0-No pain     Ortho/SPM Exam  Examination right thumb shows the incision well healed little bit of mild redness and swelling dorsally but no discrete cyst is located   No evidence of infection range of motion full    RADIOGRAPHS:  None  Comments: I have personally reviewed the imaging and I agree with the above radiologist's report.    ASSESSMENT/PLAN:     IMPRESSION:  Mild inflammation right thumb after previous excision cyst    PLAN:  I recommended we try cortisone injection he was in agreement   After pause for time-out identified the right thumb injected IP joint dorsally with combination Kenalog 20 mg 0.5 cc xylocaine sterile technique   Tolerated the procedure well without complication  Recommended some gentle massage Advil as needed    FOLLOW UP:  4-6 weeks or as needed    Disclaimer: This note has been generated using voice-recognition software. There may be typographical errors that have been missed during proof-reading.     "

## 2022-11-20 ENCOUNTER — PATIENT MESSAGE (OUTPATIENT)
Dept: INTERNAL MEDICINE | Facility: CLINIC | Age: 62
End: 2022-11-20
Payer: COMMERCIAL

## 2023-01-05 ENCOUNTER — OFFICE VISIT (OUTPATIENT)
Dept: OPHTHALMOLOGY | Facility: CLINIC | Age: 63
End: 2023-01-05
Payer: COMMERCIAL

## 2023-01-05 DIAGNOSIS — Z96.1 STATUS POST CATARACT EXTRACTION AND INSERTION OF INTRAOCULAR LENS, RIGHT: ICD-10-CM

## 2023-01-05 DIAGNOSIS — Z98.42 STATUS POST CATARACT EXTRACTION AND INSERTION OF INTRAOCULAR LENS, LEFT: ICD-10-CM

## 2023-01-05 DIAGNOSIS — Z96.1 STATUS POST CATARACT EXTRACTION AND INSERTION OF INTRAOCULAR LENS, LEFT: ICD-10-CM

## 2023-01-05 DIAGNOSIS — H31.011 MACULAR SCAR, RIGHT: ICD-10-CM

## 2023-01-05 DIAGNOSIS — Z98.41 STATUS POST CATARACT EXTRACTION AND INSERTION OF INTRAOCULAR LENS, RIGHT: ICD-10-CM

## 2023-01-05 DIAGNOSIS — H53.9 VISUAL DISTURBANCE: Primary | ICD-10-CM

## 2023-01-05 PROCEDURE — 99999 PR PBB SHADOW E&M-EST. PATIENT-LVL II: CPT | Mod: PBBFAC,,, | Performed by: OPHTHALMOLOGY

## 2023-01-05 PROCEDURE — 99999 PR PBB SHADOW E&M-EST. PATIENT-LVL II: ICD-10-PCS | Mod: PBBFAC,,, | Performed by: OPHTHALMOLOGY

## 2023-01-05 PROCEDURE — 99204 PR OFFICE/OUTPT VISIT, NEW, LEVL IV, 45-59 MIN: ICD-10-PCS | Mod: S$GLB,,, | Performed by: OPHTHALMOLOGY

## 2023-01-05 PROCEDURE — 1159F MED LIST DOCD IN RCRD: CPT | Mod: CPTII,S$GLB,, | Performed by: OPHTHALMOLOGY

## 2023-01-05 PROCEDURE — 92133 POSTERIOR SEGMENT OCT OPTIC NERVE(OCULAR COHERENCE TOMOGRAPHY) - OU - BOTH EYES: ICD-10-PCS | Mod: S$GLB,,, | Performed by: OPHTHALMOLOGY

## 2023-01-05 PROCEDURE — 92133 CPTRZD OPH DX IMG PST SGM ON: CPT | Mod: S$GLB,,, | Performed by: OPHTHALMOLOGY

## 2023-01-05 PROCEDURE — 99204 OFFICE O/P NEW MOD 45 MIN: CPT | Mod: S$GLB,,, | Performed by: OPHTHALMOLOGY

## 2023-01-05 PROCEDURE — 1159F PR MEDICATION LIST DOCUMENTED IN MEDICAL RECORD: ICD-10-PCS | Mod: CPTII,S$GLB,, | Performed by: OPHTHALMOLOGY

## 2023-01-05 NOTE — LETTER
[facility] - 10th Fl  [address]  [facility] [address]  Phone: [phone]  Fax: [phone]   January 5, 2023    Mark Kirby MD  [facility] [address]    Patient: Waldo English   MR Number: [id number]   YOB: 1960   Date of Visit: 1/5/2023       Dear Dr. Kirby:    Thank you for referring Waldo English to me for evaluation. Here is my assessment and plan of care:      Patient reports doing well following cataract surgery done in approximately 2018, however then experienced some vision changes in the last year.  Notably-images of different sizes, glare and halos, and a yellow tint in his peripheral vision out of his left eye.    Although patient did not have significant PCO, decision was made to proceed with the eye capsulotomy in the left eye with Dr. Kirby.  Patient notes no improvement following that procedure.    Anterior segment exam within normal limits today.  Topography shows symmetric oblique astigmatism in both eyes.  Lenses are clear and in good position.  Well centered    Explained that some of his patient's issues are related to the pre-existing macular scar in the right eye.  I can not explain his other visual disturbances.  Would recommend visual fields and evaluation with Neuro-Ophthalmology.      Below you will find my full exam findings. If you have questions, please do not hesitate to call me. I look forward to following Mr. Waldo English along with you.    Sincerely,        Jillian Tovar MD       CC  No Recipients             Base Eye Exam       Visual Acuity (Snellen - Linear)         Right Left    Dist sc 20/125 20/30              Tonometry (Tonopen, 3:05 PM)         Right Left    Pressure 12 11                  Slit Lamp and Fundus Exam       Slit Lamp Exam         Right Left    Lids/Lashes Normal Normal    Conjunctiva/Sclera White and quiet White and quiet    Cornea Clear Clear    Anterior Chamber Deep and quiet Deep and quiet     Iris Round and reactive Round and reactive    Lens pciol, tr pco pciol, open PC    Vitreous Normal Normal              Fundus Exam         Right Left    Disc Normal Normal    Macula mac scar                   Refraction       Manifest Refraction (Auto)         Sphere Cylinder Axis    Right -0.25 +0.50 007    Left +0.25 +0.50 018

## 2023-02-01 ENCOUNTER — OFFICE VISIT (OUTPATIENT)
Dept: OPHTHALMOLOGY | Facility: CLINIC | Age: 63
End: 2023-02-01
Payer: COMMERCIAL

## 2023-02-01 ENCOUNTER — CLINICAL SUPPORT (OUTPATIENT)
Dept: OPHTHALMOLOGY | Facility: CLINIC | Age: 63
End: 2023-02-01
Payer: COMMERCIAL

## 2023-02-01 DIAGNOSIS — H53.411 CENTRAL SCOTOMA, RIGHT EYE: Primary | ICD-10-CM

## 2023-02-01 DIAGNOSIS — H46.9 RIGHT OPTIC NEUROPATHY: Primary | ICD-10-CM

## 2023-02-01 DIAGNOSIS — H31.011 MACULAR SCAR, RIGHT: ICD-10-CM

## 2023-02-01 DIAGNOSIS — H46.9 RIGHT OPTIC NEUROPATHY: ICD-10-CM

## 2023-02-01 PROCEDURE — 1159F MED LIST DOCD IN RCRD: CPT | Mod: CPTII,S$GLB,, | Performed by: OPHTHALMOLOGY

## 2023-02-01 PROCEDURE — 99999 PR PBB SHADOW E&M-EST. PATIENT-LVL III: ICD-10-PCS | Mod: PBBFAC,,, | Performed by: OPHTHALMOLOGY

## 2023-02-01 PROCEDURE — 1160F RVW MEDS BY RX/DR IN RCRD: CPT | Mod: CPTII,S$GLB,, | Performed by: OPHTHALMOLOGY

## 2023-02-01 PROCEDURE — 1160F PR REVIEW ALL MEDS BY PRESCRIBER/CLIN PHARMACIST DOCUMENTED: ICD-10-PCS | Mod: CPTII,S$GLB,, | Performed by: OPHTHALMOLOGY

## 2023-02-01 PROCEDURE — 92083 EXTENDED VISUAL FIELD XM: CPT | Mod: S$GLB,,, | Performed by: OPHTHALMOLOGY

## 2023-02-01 PROCEDURE — 99215 PR OFFICE/OUTPT VISIT, EST, LEVL V, 40-54 MIN: ICD-10-PCS | Mod: S$GLB,,, | Performed by: OPHTHALMOLOGY

## 2023-02-01 PROCEDURE — 1159F PR MEDICATION LIST DOCUMENTED IN MEDICAL RECORD: ICD-10-PCS | Mod: CPTII,S$GLB,, | Performed by: OPHTHALMOLOGY

## 2023-02-01 PROCEDURE — 99215 OFFICE O/P EST HI 40 MIN: CPT | Mod: S$GLB,,, | Performed by: OPHTHALMOLOGY

## 2023-02-01 PROCEDURE — 99999 PR PBB SHADOW E&M-EST. PATIENT-LVL III: CPT | Mod: PBBFAC,,, | Performed by: OPHTHALMOLOGY

## 2023-02-01 PROCEDURE — 92083 HUMPHREY VISUAL FIELD - OU - BOTH EYES: ICD-10-PCS | Mod: S$GLB,,, | Performed by: OPHTHALMOLOGY

## 2023-02-01 NOTE — PROGRESS NOTES
HPI    Referred by Dr.Kullman MOSER  Pt states OU vision hazy mainly at night. Mainly OS>OD Seeing halos star   burst.  Noticing a yellowish [place] in peripheral vision past year.    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     Injury to macula OD with some permanent visual loss.   Last edited by Richmond Campos MD on 2/1/2023 10:21 AM.            Assessment /Plan     For exam results, see Encounter Report.    Central scotoma, right eye    Right optic neuropathy  -     Paris Visual Field - OU - Extended - Both Eyes    Macular scar, right      Mr. English has difficulty with haloes and glare related to lights at night that improve with pinhole indicating a refractive source. He has a yellow patch that he sees to his left that seems to correspond too the blind spot in that eye. I would  hypothesize that he has discovered his blind spot against the background of the altered central visual field in his right eye. I'm not sure why he would not have noticed this  sooner since the injury to his right eye occurred as a child. All of his issues appear refractive or ocular in origin without a neurological component. If questions persist, examination by a retina specialist would be appropriate.

## 2023-02-01 NOTE — PATIENT INSTRUCTIONS
Mr. English has difficulty with haloes and glare related to lights at night that improve with pinhole indicating a refractive source. He has a yellow patch that he sees to his left that seems to correspond too the blind spot in that eye. I would  hypothesize that he has discovered his blind spot against the background of the altered central visual field in his right eye. I'm not sure why he would not have noticed this  sooner since the injury to his right eye occurred as a child. All of his issues appear refractive or ocular in origin without a neurological component. If questions persist, examination by a retina specialist would be appropriate.

## 2023-02-01 NOTE — LETTER
[facility] - [address] [place] [address]  [address]  [facility] [address]  Phone: [phone]  Fax: [phone]   February 1, 2023    Jillian Tovar MD  [address]  [facility] [address]    Patient: Waldo English   MR Number: [id number]   YOB: 1960   Date of Visit: 2/1/2023       Dear Dr. Tovar:    Thank you for referring Waldo English to me for evaluation. Here is my assessment and plan of care:    Assessment/Plan    For exam results, see Encounter Report.    Central scotoma, right eye    Right optic neuropathy  -     Paris Visual Field - OU - Extended - Both Eyes    Macular scar, right      Mr. English has difficulty with haloes and glare related to lights at night that improve with pinhole indicating a refractive source. He has a yellow patch that he sees to his left that seems to correspond too the blind spot in that eye. I would  hypothesize that he has discovered his blind spot against the background of the altered central visual field in his right eye. I'm not sure why he would not have noticed this  sooner since the injury to his right eye occurred as a child. All of his issues appear refractive or ocular in origin without a neurological component. If questions persist, examination by a retina specialist would be appropriate.          Below you will find my full exam findings. If you have questions, please do not hesitate to call me. I look forward to following Mr. Waldo English along with you.    Sincerely,          Richmond Campos MD       CC  No Recipients             Base Eye Exam       Visual Acuity (Snellen - Linear)         Right Left    Dist sc 20/60 -1 20/20    Dist ph sc 20/50 -2    OD missing some letter nasally. Sees haloes around lights OS that improve noticeably with pinhole. Notes yellow area in left field of vision OS that corresponds to blind spot.             Tonometry (Tonopen, 10:24 AM)         Right Left    Pressure 18 18              Pupils         Dark Light  Shape React APD    Right 4 2 Round Brisk +2    Left 4 2 Round Brisk None              Visual Fields    See HVF report.             Extraocular Movement         Right Left     Full, Ortho Full, Ortho              Neuro/Psych       Oriented x3: Yes    Mood/Affect: Normal              Dilation       Both eyes: 2.5% Phenylephrine, 1% Mydriacyl @ 10:24 AM                  Slit Lamp and Fundus Exam       External Exam         Right Left    External Normal Normal              Slit Lamp Exam         Right Left    Lids/Lashes Normal Normal    Conjunctiva/Sclera White and quiet White and quiet    Cornea Clear Clear    Anterior Chamber Deep and quiet Deep and quiet    Iris Round and reactive Round and reactive    Lens Posterior chamber intraocular lens, 1+ Posterior capsular opacification Posterior chamber intraocular lens, Open posterior capsule    Vitreous Normal Normal              Fundus Exam         Right Left    Disc 2+ Pallor Normal    C/D Ratio 0.3 0.3    Macula Mottling Normal    Vessels Normal Normal    Periphery Normal Normal

## 2023-05-05 DIAGNOSIS — Z00.00 ROUTINE GENERAL MEDICAL EXAMINATION AT A HEALTH CARE FACILITY: Primary | ICD-10-CM

## 2023-05-15 ENCOUNTER — TELEPHONE (OUTPATIENT)
Dept: OPHTHALMOLOGY | Facility: CLINIC | Age: 63
End: 2023-05-15
Payer: COMMERCIAL

## 2023-05-15 ENCOUNTER — PATIENT MESSAGE (OUTPATIENT)
Dept: OPHTHALMOLOGY | Facility: CLINIC | Age: 63
End: 2023-05-15
Payer: COMMERCIAL

## 2023-05-17 ENCOUNTER — HOSPITAL ENCOUNTER (OUTPATIENT)
Dept: RADIOLOGY | Facility: HOSPITAL | Age: 63
Discharge: HOME | End: 2023-05-17
Attending: INTERNAL MEDICINE
Payer: COMMERCIAL

## 2023-05-17 ENCOUNTER — CLINICAL SUPPORT (OUTPATIENT)
Dept: INTERNAL MEDICINE | Facility: CLINIC | Age: 63
End: 2023-05-17
Payer: COMMERCIAL

## 2023-05-17 ENCOUNTER — CLINICAL SUPPORT (OUTPATIENT)
Dept: INTERNAL MEDICINE | Facility: CLINIC | Age: 63
End: 2023-05-17

## 2023-05-17 ENCOUNTER — OFFICE VISIT (OUTPATIENT)
Dept: INTERNAL MEDICINE | Facility: CLINIC | Age: 63
End: 2023-05-17
Payer: COMMERCIAL

## 2023-05-17 ENCOUNTER — HOSPITAL ENCOUNTER (OUTPATIENT)
Dept: CARDIOLOGY | Facility: CLINIC | Age: 63
Discharge: HOME | End: 2023-05-17
Payer: COMMERCIAL

## 2023-05-17 VITALS
WEIGHT: 174 LBS | SYSTOLIC BLOOD PRESSURE: 120 MMHG | HEIGHT: 70 IN | DIASTOLIC BLOOD PRESSURE: 65 MMHG | BODY MASS INDEX: 24.91 KG/M2

## 2023-05-17 DIAGNOSIS — Z00.00 ROUTINE GENERAL MEDICAL EXAMINATION AT A HEALTH CARE FACILITY: Primary | ICD-10-CM

## 2023-05-17 DIAGNOSIS — Z13.6 ENCOUNTER FOR SCREENING FOR CARDIOVASCULAR DISORDERS: ICD-10-CM

## 2023-05-17 DIAGNOSIS — K57.30 DIVERTICULOSIS OF LARGE INTESTINE WITHOUT HEMORRHAGE: ICD-10-CM

## 2023-05-17 DIAGNOSIS — I34.0 NON-RHEUMATIC MITRAL REGURGITATION: ICD-10-CM

## 2023-05-17 DIAGNOSIS — Z00.00 ANNUAL PHYSICAL EXAM: Primary | ICD-10-CM

## 2023-05-17 DIAGNOSIS — E78.2 MIXED HYPERLIPIDEMIA: ICD-10-CM

## 2023-05-17 DIAGNOSIS — G47.33 OSA (OBSTRUCTIVE SLEEP APNEA): ICD-10-CM

## 2023-05-17 DIAGNOSIS — M54.9 DORSALGIA, UNSPECIFIED: ICD-10-CM

## 2023-05-17 DIAGNOSIS — Z00.00 ROUTINE GENERAL MEDICAL EXAMINATION AT A HEALTH CARE FACILITY: ICD-10-CM

## 2023-05-17 DIAGNOSIS — R73.01 IFG (IMPAIRED FASTING GLUCOSE): ICD-10-CM

## 2023-05-17 DIAGNOSIS — M54.31 SCIATICA OF RIGHT SIDE: ICD-10-CM

## 2023-05-17 DIAGNOSIS — Z87.891 FORMER SMOKER: ICD-10-CM

## 2023-05-17 DIAGNOSIS — F43.9 SITUATIONAL STRESS: ICD-10-CM

## 2023-05-17 PROBLEM — Q74.0 THUMB ANOMALY: Status: RESOLVED | Noted: 2021-05-21 | Resolved: 2023-05-17

## 2023-05-17 PROBLEM — M67.40 MUCOID CYST OF JOINT: Status: RESOLVED | Noted: 2021-12-15 | Resolved: 2023-05-17

## 2023-05-17 LAB
ALBUMIN SERPL BCP-MCNC: 3.8 G/DL (ref 3.5–5.2)
ALP SERPL-CCNC: 80 U/L (ref 55–135)
ALT SERPL W/O P-5'-P-CCNC: 17 U/L (ref 10–44)
ANION GAP SERPL CALC-SCNC: 8 MMOL/L (ref 8–16)
AST SERPL-CCNC: 18 U/L (ref 10–40)
BILIRUB SERPL-MCNC: 0.9 MG/DL (ref 0.1–1)
BUN SERPL-MCNC: 12 MG/DL (ref 8–23)
CALCIUM SERPL-MCNC: 9.6 MG/DL (ref 8.7–10.5)
CHLORIDE SERPL-SCNC: 106 MMOL/L (ref 95–110)
CHOLEST SERPL-MCNC: 125 MG/DL (ref 120–199)
CHOLEST/HDLC SERPL: 2.6 {RATIO} (ref 2–5)
CO2 SERPL-SCNC: 27 MMOL/L (ref 23–29)
COMPLEXED PSA SERPL-MCNC: 0.16 NG/ML (ref 0–4)
CREAT SERPL-MCNC: 0.9 MG/DL (ref 0.5–1.4)
ERYTHROCYTE [DISTWIDTH] IN BLOOD BY AUTOMATED COUNT: 12.4 % (ref 11.5–14.5)
EST. GFR  (NO RACE VARIABLE): >60 ML/MIN/1.73 M^2
ESTIMATED AVG GLUCOSE: 105 MG/DL (ref 68–131)
GLUCOSE SERPL-MCNC: 97 MG/DL (ref 70–110)
HBA1C MFR BLD: 5.3 % (ref 4–5.6)
HCT VFR BLD AUTO: 45.6 % (ref 40–54)
HDLC SERPL-MCNC: 48 MG/DL (ref 40–75)
HDLC SERPL: 38.4 % (ref 20–50)
HGB BLD-MCNC: 14.9 G/DL (ref 14–18)
LDLC SERPL CALC-MCNC: 65.2 MG/DL (ref 63–159)
MCH RBC QN AUTO: 29.7 PG (ref 27–31)
MCHC RBC AUTO-ENTMCNC: 32.7 G/DL (ref 32–36)
MCV RBC AUTO: 91 FL (ref 82–98)
NONHDLC SERPL-MCNC: 77 MG/DL
PLATELET # BLD AUTO: 298 K/UL (ref 150–450)
PMV BLD AUTO: 9.8 FL (ref 9.2–12.9)
POTASSIUM SERPL-SCNC: 4.2 MMOL/L (ref 3.5–5.1)
PROT SERPL-MCNC: 7.1 G/DL (ref 6–8.4)
RBC # BLD AUTO: 5.01 M/UL (ref 4.6–6.2)
SODIUM SERPL-SCNC: 141 MMOL/L (ref 136–145)
TRIGL SERPL-MCNC: 59 MG/DL (ref 30–150)
TSH SERPL DL<=0.005 MIU/L-ACNC: 1.71 UIU/ML (ref 0.4–4)
WBC # BLD AUTO: 8.38 K/UL (ref 3.9–12.7)

## 2023-05-17 PROCEDURE — 93005 EKG 12-LEAD: ICD-10-PCS | Mod: S$GLB,,, | Performed by: INTERNAL MEDICINE

## 2023-05-17 PROCEDURE — 80053 COMPREHEN METABOLIC PANEL: CPT | Performed by: INTERNAL MEDICINE

## 2023-05-17 PROCEDURE — 93005 ELECTROCARDIOGRAM TRACING: CPT | Mod: S$GLB,,, | Performed by: INTERNAL MEDICINE

## 2023-05-17 PROCEDURE — 99999 PR PBB SHADOW E&M-EST. PATIENT-LVL I: ICD-10-PCS | Mod: PBBFAC,,,

## 2023-05-17 PROCEDURE — 72100 X-RAY EXAM L-S SPINE 2/3 VWS: CPT | Mod: TC

## 2023-05-17 PROCEDURE — 84443 ASSAY THYROID STIM HORMONE: CPT | Performed by: INTERNAL MEDICINE

## 2023-05-17 PROCEDURE — 99999 PR PBB SHADOW E&M-EST. PATIENT-LVL V: CPT | Mod: PBBFAC,,, | Performed by: INTERNAL MEDICINE

## 2023-05-17 PROCEDURE — 85027 COMPLETE CBC AUTOMATED: CPT | Performed by: INTERNAL MEDICINE

## 2023-05-17 PROCEDURE — 93010 EKG 12-LEAD: ICD-10-PCS | Mod: S$GLB,,, | Performed by: INTERNAL MEDICINE

## 2023-05-17 PROCEDURE — 72100 X-RAY EXAM L-S SPINE 2/3 VWS: CPT | Mod: 26,,, | Performed by: RADIOLOGY

## 2023-05-17 PROCEDURE — 99199 UNLISTED SPECIAL SVC PX/RPRT: CPT | Mod: S$GLB,,, | Performed by: INTERNAL MEDICINE

## 2023-05-17 PROCEDURE — 99999 PR PBB SHADOW E&M-EST. PATIENT-LVL I: CPT | Mod: PBBFAC,,,

## 2023-05-17 PROCEDURE — 83036 HEMOGLOBIN GLYCOSYLATED A1C: CPT | Performed by: INTERNAL MEDICINE

## 2023-05-17 PROCEDURE — 84153 ASSAY OF PSA TOTAL: CPT | Performed by: INTERNAL MEDICINE

## 2023-05-17 PROCEDURE — 99199 PR SPECIAL SERVICE/PROC/REPORT: ICD-10-PCS | Mod: S$GLB,,, | Performed by: INTERNAL MEDICINE

## 2023-05-17 PROCEDURE — 99999 PR PBB SHADOW E&M-EST. PATIENT-LVL V: ICD-10-PCS | Mod: PBBFAC,,, | Performed by: INTERNAL MEDICINE

## 2023-05-17 PROCEDURE — 72100 XR LUMBAR SPINE AP AND LATERAL: ICD-10-PCS | Mod: 26,,, | Performed by: RADIOLOGY

## 2023-05-17 PROCEDURE — 80061 LIPID PANEL: CPT | Performed by: INTERNAL MEDICINE

## 2023-05-17 PROCEDURE — 93010 ELECTROCARDIOGRAM REPORT: CPT | Mod: S$GLB,,, | Performed by: INTERNAL MEDICINE

## 2023-05-17 NOTE — LETTER
May 17, 2023    Waldohaim Hicksmerissa  [address]  [place] [address]             [facility]  [address]  [place] [address]  Phone: [phone]  Fax: [phone] Dear Mr. English:    Thank you for allowing me to serve you and perform your Executive Health exam on 5/17/2023.  This letter will serve a brief summary of the history, physical findings, and laboratory/studies performed and recommendations at that time.    Reason for Visit: Executive Health Preventive Physical Examination    Past Medical History:   Diagnosis Date    BPH (benign prostatic hypertrophy) 4/29/2015    Chronic rhinitis 4/29/2015    Diverticulosis of large intestine without hemorrhage: see colonoscopy 9/17 3/26/2018    Family history of colon cancer: father age 65 4/29/2015    Former smoker 1/2 pack daily for 10 years quit 2000 4/29/2015    GERD (gastroesophageal reflux disease) 4/29/2015    Hyperlipidemia 4/29/2015    Mitral regurgitation 4/22/2016    Rhinitis 4/29/2015       Past Surgical History:   Procedure Laterality Date    cataract surgery      April then August 2018 bilateral    EXCISION OF GANGLION CYST OF HAND Right 1/19/2022    Procedure: EXCISION, GANGLION CYST, HAND;  Surgeon: Lennox Verdin Jr., MD;  Location: [facility];  Service: Orthopedics;  Laterality: Right;  thumb       Family History   Problem Relation Age of Onset    Hypertension Mother     Hyperlipidemia Mother     Heart disease Mother         A fib    Pulmonary fibrosis Mother     Cancer Father 65        colon; brain; liver; bladder; lung    Heart disease Father         porcine valve    Multiple sclerosis Sister     Diabetes Sister     No Known Problems Sister     No Known Problems Brother     No Known Problems Daughter        Social History     Socioeconomic History    Marital status:     Number of children: 1   Tobacco Use    Smoking status: Former     Packs/day: 0.50     Years: 20.00     Pack years:  10.00     Types: Cigarettes     Quit date: 2000     Years since quittin.0     Passive exposure: Never    Smokeless tobacco: Never   Substance and Sexual Activity    Alcohol use: Yes     Comment: Rarely, maybe 1-2 drinks a week if that    Drug use: No    Sexual activity: Yes     Partners: Female        Review of patient's allergies indicates:  No Known Allergies      Current Outpatient Medications:     aspirin 81 MG Chew, Take 1 tablet (81 mg total) by mouth once daily., Disp: , Rfl:     atorvastatin (LIPITOR) 20 MG tablet, Take 1 tablet (20 mg total) by mouth once daily., Disp: 90 tablet, Rfl: 3    famotidine (PEPCID) 20 MG tablet, Take 20 mg by mouth every other day., Disp: , Rfl:     finasteride (PROSCAR) 5 mg tablet, Take 1 tablet (5 mg total) by mouth once daily., Disp: 90 tablet, Rfl: 3    fluticasone propionate (FLONASE) 50 mcg/actuation nasal spray, INHALE 2 SPRAYS INTO EACH NOSTRIL TWICE DAILY, Disp: 48 g, Rfl: 3    glucosamine/msm/csa/samantha/ohr664 (GLUCOSAMIN-CHOND-MSM-SAMANTHA-115HC ORAL), Take by mouth., Disp: , Rfl:     RABEprazole (ACIPHEX) 20 mg tablet, Take 1 tablet (20 mg total) by mouth every other day, Disp: 45 tablet, Rfl: 1     Review of Systems  Review of Systems - Negative except for situational stress, slightly impaired sleep and some ongoing discomfort in the back with some sciatica and leg symptoms.    Physical Exam:  General: General appearance: alert, well appearing, and in no distress.   Skin: Skin exam - normal coloration and turgor, no rashes, no suspicious skin lesions noted.  HEENT: Ears - bilateral TM's and external ear canals normal. , ENT exam reveals - ENT exam normal, no neck nodes or sinus tenderness.   Lungs: Chest: clear to auscultation, no wheezes, rales or rhonchi, symmetric air entry.   Heart: CVS exam: normal rate, regular rhythm, normal S1, S2, no murmurs, rubs, clicks or gallops.   Extremities: Exam of extremities: peripheral pulses normal, no pedal edema,  no clubbing or cyanosis  Back:  No CVA tenderness.  No pain over spine.  Negative straight leg raises.  Tight hamstrings    Labs:  Results for orders placed or performed in visit on 05/17/23   Comprehensive metabolic panel   Result Value Ref Range    Sodium 141 136 - 145 mmol/L    Potassium 4.2 3.5 - 5.1 mmol/L    Chloride 106 95 - 110 mmol/L    CO2 27 23 - 29 mmol/L    Glucose 97 70 - 110 mg/dL    BUN 12 8 - 23 mg/dL    Creatinine 0.9 0.5 - 1.4 mg/dL    Calcium 9.6 8.7 - 10.5 mg/dL    Total Protein 7.1 6.0 - 8.4 g/dL    Albumin 3.8 3.5 - 5.2 g/dL    Total Bilirubin 0.9 0.1 - 1.0 mg/dL    Alkaline Phosphatase 80 55 - 135 U/L    AST 18 10 - 40 U/L    ALT 17 10 - 44 U/L    Anion Gap 8 8 - 16 mmol/L    eGFR >60.0 >60 mL/min/1.73 m^2   CBC Without Differential   Result Value Ref Range    WBC 8.38 3.90 - 12.70 K/uL    RBC 5.01 4.60 - 6.20 M/uL    Hemoglobin 14.9 14.0 - 18.0 g/dL    Hematocrit 45.6 40.0 - 54.0 %    MCV 91 82 - 98 fL    MCH 29.7 27.0 - 31.0 pg    MCHC 32.7 32.0 - 36.0 g/dL    RDW 12.4 11.5 - 14.5 %    Platelets 298 150 - 450 K/uL    MPV 9.8 9.2 - 12.9 fL   Lipid panel   Result Value Ref Range    Cholesterol 125 120 - 199 mg/dL    Triglycerides 59 30 - 150 mg/dL    HDL 48 40 - 75 mg/dL    LDL Cholesterol 65.2 63.0 - 159.0 mg/dL    HDL/Cholesterol Ratio 38.4 20.0 - 50.0 %    Total Cholesterol/HDL Ratio 2.6 2.0 - 5.0    Non-HDL Cholesterol 77 mg/dL   TSH   Result Value Ref Range    TSH 1.708 0.400 - 4.000 uIU/mL   PSA, Screening (every year)   Result Value Ref Range    PSA, Screen 0.16 0.00 - 4.00 ng/mL   Hemoglobin A1c   Result Value Ref Range    Hemoglobin A1C 5.3 4.0 - 5.6 %    Estimated Avg Glucose 105 68 - 131 mg/dL      EKG was acceptable.    Assessment/Recommendations:  Routine Health Maintenance:  You had a tetanus diphtheria pertussis booster today which is good for 10 years.  Otherwise, you are up-to-date on your routine health maintenance.    We discussed getting an echocardiogram to evaluate your  leaky mitral valve, as well as a coronary CT to determine whether you have any evidence of plaque.  This helps with risk stratification to determine whether you still need to be on a statin or if so how much.    In addition, I am recommending a spine assessment with a lumbar spine x-ray and an appointment in the Spine Clinic.  Physical therapy is often extremely useful for this .  Low-impact exercise , gentle stretches and avoidance of prolonged sitting are very valuable.      Good sleep hygiene and compliance with sleep apnea will be very important.  Working on stress reduction and whatever way possible is also valuable.  Exercising for 30-45 minutes most days is often extremely effective for this.    I will review the remainder of your results when completed.  Please contact me should you have any questions or concerns regarding physical findings, or my recommendations.        Sincerely,          Elissa Maldonado MD, FACP

## 2023-05-17 NOTE — PROGRESS NOTES
Patient ID: Waldo English is a 63 y.o. male.    Chief Complaint: Executive Health      Assessment:       1. Annual physical exam    2. Mixed hyperlipidemia    3. IFG (impaired fasting glucose)    4. Former smoker 1/2 pack daily for 10 years quit 2000    5. ROSARIO (obstructive sleep apnea):  see HST 4/18 (mild)    6. Diverticulosis of large intestine without hemorrhage: see colonoscopy 9/17    7. Sciatica of right side    8. Dorsalgia, unspecified    9. Situational stress    10. Encounter for screening for cardiovascular disorders    11. Non-rheumatic mitral regurgitation: Mitral regurgitation: trivial see ECHO 4/16          Plan:         1. Annual physical exam    2. Mixed hyperlipidemia    3. IFG (impaired fasting glucose)    4. Former smoker 1/2 pack daily for 10 years quit 2000    5. ROSARIO (obstructive sleep apnea):  see HST 4/18 (mild)    6. Diverticulosis of large intestine without hemorrhage: see colonoscopy 9/17    7. Sciatica of right side  -     Ambulatory referral/consult to Back [facility] Spine Clinic; Future; Expected date: 05/24/2023    8. Dorsalgia, unspecified  -     X-Ray Lumbar Spine Ap And Lateral    9. Situational stress    10. Encounter for screening for cardiovascular disorders  -     CT Cardiac Scoring; Future; Expected date: 05/17/2023    11. Non-rheumatic mitral regurgitation: Mitral regurgitation: trivial see ECHO 4/16  Overview:  Mitral regurgitation: trivial see ECHO 4/16    Orders:  -     Echo    Other orders  -     (In Office Administered) Tdap Vaccine       Sleep hygiene, exercise and stress reduction techniques reviewed  Back x-rays and Spine Clinic; gentle stretches, low-impact exercise reviewed  Compliance with sleep apnea, he is working on this  Echocardiogram  Coronary CT for risk stratification  Tdap today  I will review all studies and determine further tx depending on findings  He follows with outside Dermatology    Subjective:   EH PE      He got the first of the 2 new shingles vaccines,  will be due in the next couple of months for the second.  Tetanus booster is also recommended.  COVID booster discussed.    Labs and EKG done today.  Acceptable.    Lots of stress- work.  Not sleeping well.  He has mild ROSARIO, not using machine since machine bothers wife.  He thinks the stress is keeping him up.  Slight weight loss.    Colonoscopy 12/22 acceptable.    Leg pain.  Feels some tingling in the legs. Not sure if it is sciatica vs atorvastatin.  If he lies on the R it is worse.  Sx worse down R leg.  Has had slight improvement.  Has had 1-2 months.  May have pulled a groin muscle getting out of a low car.      Cutting back on Lipitor every other day, may be slightly better.  Does take coQ 10.     Wasp sting this week.  No lip or tongue swelling.    Post cataract laser capsulotomy.  Still not 100%.    Patient Active Problem List   Diagnosis    Mixed hyperlipidemia    Chronic rhinitis    Benign non-nodular prostatic hyperplasia with lower urinary tract symptoms    Former smoker 1/2 pack daily for 10 years quit 2000    IFG (impaired fasting glucose)    Family history of colon cancer: father age 65    GERD without esophagitis, follows with Dr Jensen, EGD 2017.  Repeated 12/22 benign polyp    Non-rheumatic mitral regurgitation: Mitral regurgitation: trivial see ECHO 4/16    Diverticulosis of large intestine without hemorrhage: see colonoscopy 9/17    ROSARIO (obstructive sleep apnea):  see HST 4/18 (mild)    Primary osteoarthritis of first carpometacarpal joint of left hand        Review of Systems   Constitutional: Negative.    HENT:  Negative for sinus pressure.    Eyes:  Negative for visual disturbance.   Respiratory:  Negative for apnea, choking, shortness of breath and stridor.    Cardiovascular:  Negative for chest pain.   Gastrointestinal:  Negative for abdominal pain, constipation and diarrhea.   Genitourinary:  Negative for difficulty urinating, flank pain, frequency, testicular pain and urgency.    Musculoskeletal:  Positive for back pain. Negative for arthralgias.        Sciatica   Skin:  Negative for color change and rash.   Neurological: Negative.    Psychiatric/Behavioral: Negative.          Situational stress, see above       Objective:      Physical Exam  Constitutional:       Appearance: He is well-developed.   HENT:      Head: Normocephalic and atraumatic.      Right Ear: External ear normal.      Left Ear: External ear normal.   Eyes:      Extraocular Movements: Extraocular movements intact.      Conjunctiva/sclera: Conjunctivae normal.   Neck:      Thyroid: No thyromegaly.   Cardiovascular:      Rate and Rhythm: Normal rate and regular rhythm.      Heart sounds: No murmur heard.     Comments: Faint systolic murmur  Pulmonary:      Effort: Pulmonary effort is normal. No respiratory distress.      Breath sounds: Normal breath sounds. No wheezing.   Abdominal:      General: There is no distension.      Palpations: Abdomen is soft.      Tenderness: There is no abdominal tenderness.   Musculoskeletal:         General: No tenderness.      Cervical back: Normal range of motion and neck supple.      Right lower leg: No edema.      Left lower leg: No edema.      Comments: No pain over lumbar spine  Negative straight leg raises  Tight hamstrings   Lymphadenopathy:      Cervical: No cervical adenopathy.   Skin:     General: Skin is warm and dry.   Neurological:      General: No focal deficit present.      Mental Status: He is alert and oriented to person, place, and time.      Cranial Nerves: No cranial nerve deficit.   Psychiatric:         Mood and Affect: Mood normal.         Behavior: Behavior normal.         Thought Content: Thought content normal.         Judgment: Judgment normal.           There are no preventive care reminders to display for this patient.

## 2023-05-18 ENCOUNTER — TELEPHONE (OUTPATIENT)
Dept: INTERNAL MEDICINE | Facility: CLINIC | Age: 63
End: 2023-05-18
Payer: COMMERCIAL

## 2023-05-30 RX ORDER — RABEPRAZOLE SODIUM 20 MG/1
TABLET, DELAYED RELEASE ORAL
Qty: 45 TABLET | Refills: 3 | Status: SHIPPED | OUTPATIENT
Start: 2023-05-30

## 2023-05-30 NOTE — TELEPHONE ENCOUNTER
No care due was identified.  [facility] Embedded Care Due Messages. Reference number: [id number].   5/29/2023 11:12:52 PM CDT

## 2023-05-30 NOTE — TELEPHONE ENCOUNTER
Refill Decision Note      Refill Decision Note   Waldo English  is requesting a refill authorization.  Brief Assessment and Rationale for Refill:  Approve     Medication Therapy Plan:         Comments:     Note composed:7:50 AM 05/30/2023             Appointments     Last Visit   5/17/2023 Elissa Maldonado MD   Next Visit   Visit date not found Elissa Maldonado MD

## 2023-06-29 ENCOUNTER — PATIENT MESSAGE (OUTPATIENT)
Dept: INTERNAL MEDICINE | Facility: CLINIC | Age: 63
End: 2023-06-29
Payer: COMMERCIAL

## 2023-07-21 ENCOUNTER — HOSPITAL ENCOUNTER (OUTPATIENT)
Dept: RADIOLOGY | Facility: HOSPITAL | Age: 63
Discharge: HOME | End: 2023-07-21
Attending: INTERNAL MEDICINE
Payer: COMMERCIAL

## 2023-07-21 DIAGNOSIS — Z13.6 ENCOUNTER FOR SCREENING FOR CARDIOVASCULAR DISORDERS: ICD-10-CM

## 2023-07-21 PROCEDURE — 75571 CT HRT W/O DYE W/CA TEST: CPT | Mod: 26,,, | Performed by: RADIOLOGY

## 2023-07-21 PROCEDURE — 75571 CT HRT W/O DYE W/CA TEST: CPT | Mod: TC

## 2023-07-21 PROCEDURE — 75571 CT CALCIUM SCORING CARDIAC: ICD-10-PCS | Mod: 26,,, | Performed by: RADIOLOGY

## 2023-07-24 ENCOUNTER — TELEPHONE (OUTPATIENT)
Dept: INTERNAL MEDICINE | Facility: CLINIC | Age: 63
End: 2023-07-24
Payer: COMMERCIAL

## 2023-07-24 ENCOUNTER — PATIENT MESSAGE (OUTPATIENT)
Dept: INTERNAL MEDICINE | Facility: CLINIC | Age: 63
End: 2023-07-24
Payer: COMMERCIAL

## 2023-07-24 DIAGNOSIS — J84.9 INTERSTITIAL PULMONARY DISEASE, UNSPECIFIED: Primary | ICD-10-CM

## 2023-07-24 NOTE — TELEPHONE ENCOUNTER
Please let him know that I did order the chest CT and will review those results when completed    Please assist with scheduling chest CT

## 2023-07-24 NOTE — TELEPHONE ENCOUNTER
Called and spoke with pt, chest CT now scheduled. Informed once completed Dr. Maldonado will go over results. Pt expressed understanding

## 2023-07-27 ENCOUNTER — HOSPITAL ENCOUNTER (OUTPATIENT)
Dept: RADIOLOGY | Facility: OTHER | Age: 63
Discharge: HOME | End: 2023-07-27
Attending: INTERNAL MEDICINE
Payer: COMMERCIAL

## 2023-07-27 ENCOUNTER — OFFICE VISIT (OUTPATIENT)
Dept: SPINE | Facility: CLINIC | Age: 63
End: 2023-07-27
Payer: COMMERCIAL

## 2023-07-27 VITALS
WEIGHT: 173.94 LBS | RESPIRATION RATE: 18 BRPM | HEART RATE: 67 BPM | DIASTOLIC BLOOD PRESSURE: 72 MMHG | OXYGEN SATURATION: 100 % | SYSTOLIC BLOOD PRESSURE: 116 MMHG | BODY MASS INDEX: 24.96 KG/M2

## 2023-07-27 DIAGNOSIS — M47.896 OTHER SPONDYLOSIS, LUMBAR REGION: ICD-10-CM

## 2023-07-27 DIAGNOSIS — J84.9 INTERSTITIAL PULMONARY DISEASE, UNSPECIFIED: ICD-10-CM

## 2023-07-27 DIAGNOSIS — M79.18 MYOFASCIAL PAIN: Primary | ICD-10-CM

## 2023-07-27 DIAGNOSIS — M54.9 DORSALGIA: ICD-10-CM

## 2023-07-27 PROCEDURE — 3074F SYST BP LT 130 MM HG: CPT | Mod: CPTII,S$GLB,, | Performed by: NURSE PRACTITIONER

## 2023-07-27 PROCEDURE — 1159F PR MEDICATION LIST DOCUMENTED IN MEDICAL RECORD: ICD-10-PCS | Mod: CPTII,S$GLB,, | Performed by: NURSE PRACTITIONER

## 2023-07-27 PROCEDURE — 71250 CT CHEST WITHOUT CONTRAST: ICD-10-PCS | Mod: 26,,, | Performed by: RADIOLOGY

## 2023-07-27 PROCEDURE — 3074F PR MOST RECENT SYSTOLIC BLOOD PRESSURE < 130 MM HG: ICD-10-PCS | Mod: CPTII,S$GLB,, | Performed by: NURSE PRACTITIONER

## 2023-07-27 PROCEDURE — 99204 OFFICE O/P NEW MOD 45 MIN: CPT | Mod: S$GLB,,, | Performed by: NURSE PRACTITIONER

## 2023-07-27 PROCEDURE — 99204 PR OFFICE/OUTPT VISIT, NEW, LEVL IV, 45-59 MIN: ICD-10-PCS | Mod: S$GLB,,, | Performed by: NURSE PRACTITIONER

## 2023-07-27 PROCEDURE — 99999 PR PBB SHADOW E&M-EST. PATIENT-LVL IV: ICD-10-PCS | Mod: PBBFAC,,, | Performed by: NURSE PRACTITIONER

## 2023-07-27 PROCEDURE — 99999 PR PBB SHADOW E&M-EST. PATIENT-LVL IV: CPT | Mod: PBBFAC,,, | Performed by: NURSE PRACTITIONER

## 2023-07-27 PROCEDURE — 3078F PR MOST RECENT DIASTOLIC BLOOD PRESSURE < 80 MM HG: ICD-10-PCS | Mod: CPTII,S$GLB,, | Performed by: NURSE PRACTITIONER

## 2023-07-27 PROCEDURE — 1159F MED LIST DOCD IN RCRD: CPT | Mod: CPTII,S$GLB,, | Performed by: NURSE PRACTITIONER

## 2023-07-27 PROCEDURE — 1160F PR REVIEW ALL MEDS BY PRESCRIBER/CLIN PHARMACIST DOCUMENTED: ICD-10-PCS | Mod: CPTII,S$GLB,, | Performed by: NURSE PRACTITIONER

## 2023-07-27 PROCEDURE — 3044F HG A1C LEVEL LT 7.0%: CPT | Mod: CPTII,S$GLB,, | Performed by: NURSE PRACTITIONER

## 2023-07-27 PROCEDURE — 3008F PR BODY MASS INDEX (BMI) DOCUMENTED: ICD-10-PCS | Mod: CPTII,S$GLB,, | Performed by: NURSE PRACTITIONER

## 2023-07-27 PROCEDURE — 71250 CT THORAX DX C-: CPT | Mod: TC

## 2023-07-27 PROCEDURE — 3044F PR MOST RECENT HEMOGLOBIN A1C LEVEL <7.0%: ICD-10-PCS | Mod: CPTII,S$GLB,, | Performed by: NURSE PRACTITIONER

## 2023-07-27 PROCEDURE — 3008F BODY MASS INDEX DOCD: CPT | Mod: CPTII,S$GLB,, | Performed by: NURSE PRACTITIONER

## 2023-07-27 PROCEDURE — 3078F DIAST BP <80 MM HG: CPT | Mod: CPTII,S$GLB,, | Performed by: NURSE PRACTITIONER

## 2023-07-27 PROCEDURE — 71250 CT THORAX DX C-: CPT | Mod: 26,,, | Performed by: RADIOLOGY

## 2023-07-27 PROCEDURE — 1160F RVW MEDS BY RX/DR IN RCRD: CPT | Mod: CPTII,S$GLB,, | Performed by: NURSE PRACTITIONER

## 2023-07-27 NOTE — PROGRESS NOTES
Subjective:     Patient ID: Waldo English is a 63 y.o. male.    Chief Complaint: Low-back Pain    HPI Mr. English is a 63-year-old male here for evaluation of low back pain    Complaints of low back pain and lateral right hip pain for the last few weeks  He denies radiation down the extremities  States that his pain has mostly resolved  He is interested in trying the Healthy Back program    Lumbar xray 2023    FINDINGS:  Vertebral bodies are intact.  Disc spaces are maintained.  No bony abnormalities seen.     Impression:     Normal findings    Past Medical History:   Diagnosis Date    BPH (benign prostatic hypertrophy) 4/29/2015    Chronic rhinitis 4/29/2015    Diverticulosis of large intestine without hemorrhage: see colonoscopy 9/17 3/26/2018    Family history of colon cancer: father age 65 4/29/2015    Former smoker 1/2 pack daily for 10 years quit 2000 4/29/2015    GERD (gastroesophageal reflux disease) 4/29/2015    Hyperlipidemia 4/29/2015    Mitral regurgitation 4/22/2016    Rhinitis 4/29/2015       Past Surgical History:   Procedure Laterality Date    cataract surgery      April then August 2018 bilateral    EXCISION OF GANGLION CYST OF HAND Right 1/19/2022    Procedure: EXCISION, GANGLION CYST, HAND;  Surgeon: Lennox Verdin Jr., MD;  Location: [facility];  Service: Orthopedics;  Laterality: Right;  thumb       Family History   Problem Relation Age of Onset    Hypertension Mother     Hyperlipidemia Mother     Heart disease Mother         A fib    Pulmonary fibrosis Mother     Cancer Father 65        colon; brain; liver; bladder; lung    Heart disease Father         porcine valve    Multiple sclerosis Sister     Diabetes Sister     No Known Problems Sister     No Known Problems Brother     No Known Problems Daughter        Social History     Socioeconomic History    Marital status:     Number of children: 1   Tobacco Use    Smoking status: Former     Packs/day: 0.50     Years: 20.00     Pack years: 10.00      Types: Cigarettes     Quit date: 2000     Years since quittin.2     Passive exposure: Never    Smokeless tobacco: Never   Substance and Sexual Activity    Alcohol use: Yes     Comment: Rarely, maybe 1-2 drinks a week if that    Drug use: No    Sexual activity: Yes     Partners: Female       Current Outpatient Medications   Medication Sig Dispense Refill    aspirin 81 MG Chew Take 1 tablet (81 mg total) by mouth once daily.      atorvastatin (LIPITOR) 20 MG tablet Take 1 tablet (20 mg total) by mouth once daily. 90 tablet 3    famotidine (PEPCID) 20 MG tablet Take 20 mg by mouth every other day.      finasteride (PROSCAR) 5 mg tablet Take 1 tablet (5 mg total) by mouth once daily. 90 tablet 3    fluticasone propionate (FLONASE) 50 mcg/actuation nasal spray INHALE 2 SPRAYS INTO EACH NOSTRIL TWICE DAILY 48 g 3    glucosamine/msm/csa/samantha/psa091 (GLUCOSAMIN-CHOND-MSM-SAMANTHA-115HC ORAL) Take by mouth.      RABEprazole (ACIPHEX) 20 mg tablet TAKE 1 TABLET EVERY OTHER DAY 45 tablet 3     No current facility-administered medications for this visit.       Review of patient's allergies indicates:  No Known Allergies      Review of Systems   Constitutional: Negative for fever.   Cardiovascular:  Negative for chest pain.   Respiratory:  Negative for shortness of breath.    Musculoskeletal:  Positive for back pain and joint pain. Negative for falls.   Gastrointestinal:  Negative for abdominal pain, bowel incontinence, nausea and vomiting.   Genitourinary:  Negative for bladder incontinence.   Neurological:  Negative for numbness and paresthesias.      Objective:     General: Waldo is well-developed, well-nourished, appears stated age, in no acute distress, alert and oriented to time, place and person.     General    Vitals reviewed.  Constitutional: He is oriented to person, place, and time. He appears well-developed and well-nourished.   HENT:   Head: Atraumatic.   Nose: Nose normal.   Eyes: Conjunctivae are normal.    Cardiovascular:  Normal rate.            Pulmonary/Chest: Effort normal.   Neurological: He is alert and oriented to person, place, and time.   Psychiatric: He has a normal mood and affect. His behavior is normal. Judgment and thought content normal.     General Musculoskeletal Exam   Gait: normal     Back (L-Spine & T-Spine) / Neck (C-Spine) Exam     Back (L-Spine & T-Spine) Range of Motion   Extension:  10   Flexion:  90   Lateral bend right:  10   Lateral bend left:  10     Spinal Sensation   Right Side Sensation  L-Spine Level: normal  S-Spine Level: normal  Left Side Sensation  L-Spine Level: normal  S-Spine Level: normal    Other   He has no scoliosis .  Spinal Kyphosis:  Absent      Muscle Strength   Right Upper Extremity   Biceps: 5/5   Deltoid:  5/5  Triceps:  5/5  Left Upper Extremity  Biceps: 5/5   Deltoid:  5/5  Triceps:  5/5  Right Lower Extremity   Hip Flexion: 5/5   Quadriceps:  5/5   Ankle Dorsiflexion:  5/5   Anterior tibial:  5/5   EHL:  5/5  Left Lower Extremity   Hip Flexion: 5/5   Quadriceps:  5/5   Ankle Dorsiflexion:  5/5   Anterior tibial:  5/5   EHL:  5/5    Reflexes     Left Side  Biceps:  2+  Brachioradialis:  2+  Achilles:  2+  Ankle Clonus:  absent    Right Side   Biceps:  2+  Brachioradialis:  2+  Achilles:  2+  Ankle Clonus:  absent    Vascular Exam     Right Pulses        Carotid:                  2+    Left Pulses        Carotid:                  2+          Assessment:     1. Myofascial pain    2. Dorsalgia    3. Other spondylosis, lumbar region         Plan:        Prior records reviewed today  We discussed back pain and the nature of back pain.  We discussed that it is not one thing that causes the pain but an accumulation of multiple things that we do.    Referral to healthy back for evaluation and treatment of low back pain, core and back strengthening and good home exercise program  We discussed posture sitting and the importance of trying to sit better.    We discussed the  benefits of therapy and exercise and continuing to move.   Return for follow-up as needed      Follow-up: No follow-ups on file. If there are any questions prior to this, the patient was instructed to contact the office.

## 2023-07-28 ENCOUNTER — PATIENT MESSAGE (OUTPATIENT)
Dept: INTERNAL MEDICINE | Facility: CLINIC | Age: 63
End: 2023-07-28
Payer: COMMERCIAL

## 2023-07-28 DIAGNOSIS — J47.9 BRONCHIECTASIS WITHOUT COMPLICATION: Primary | ICD-10-CM

## 2023-08-14 DIAGNOSIS — J47.9 BRONCHIECTASIS WITHOUT COMPLICATION: Primary | ICD-10-CM

## 2023-08-16 ENCOUNTER — HOSPITAL ENCOUNTER (OUTPATIENT)
Dept: PULMONOLOGY | Facility: CLINIC | Age: 63
Discharge: HOME | End: 2023-08-16
Payer: COMMERCIAL

## 2023-08-16 ENCOUNTER — OFFICE VISIT (OUTPATIENT)
Dept: PULMONOLOGY | Facility: CLINIC | Age: 63
End: 2023-08-16
Payer: COMMERCIAL

## 2023-08-16 VITALS
WEIGHT: 174.19 LBS | HEIGHT: 70 IN | DIASTOLIC BLOOD PRESSURE: 68 MMHG | OXYGEN SATURATION: 98 % | BODY MASS INDEX: 24.94 KG/M2 | SYSTOLIC BLOOD PRESSURE: 119 MMHG | HEART RATE: 87 BPM

## 2023-08-16 VITALS — HEIGHT: 69 IN | WEIGHT: 174.19 LBS | BODY MASS INDEX: 25.8 KG/M2

## 2023-08-16 DIAGNOSIS — J84.9 INTERSTITIAL LUNG DISEASE: ICD-10-CM

## 2023-08-16 DIAGNOSIS — J84.9 INTERSTITIAL LUNG DISEASE: Primary | ICD-10-CM

## 2023-08-16 DIAGNOSIS — J47.9 BRONCHIECTASIS WITHOUT COMPLICATION: ICD-10-CM

## 2023-08-16 LAB
DLCO SINGLE BREATH LLN: 20.79
DLCO SINGLE BREATH PRE REF: 76.5 %
DLCO SINGLE BREATH REF: 27.72
DLCOC SBVA LLN: 2.8
DLCOC SBVA REF: 4
DLCOC SINGLE BREATH LLN: 20.79
DLCOC SINGLE BREATH REF: 27.72
DLCOCSBVAULN: 5.21
DLCOCSINGLEBREATHULN: 34.65
DLCOSINGLEBREATHULN: 34.65
DLCOVA LLN: 2.8
DLCOVA PRE REF: 81.8 %
DLCOVA PRE: 3.27 ML/(MIN*MMHG*L) (ref 2.8–5.21)
DLCOVA REF: 4
DLCOVAULN: 5.21
ERV LLN: -16448.87
ERV PRE REF: 135.1 %
ERV REF: 1.13
ERVULN: ABNORMAL
FEF 25 75 LLN: 1.28
FEF 25 75 PRE REF: 136.6 %
FEF 25 75 REF: 2.71
FET100 CHG: 5.5 %
FEV05 LLN: 1.53
FEV05 REF: 2.67
FEV1 CHG: 0.5 %
FEV1 FVC LLN: 65
FEV1 FVC PRE REF: 102 %
FEV1 FVC REF: 77
FEV1 LLN: 2.48
FEV1 PRE REF: 121.4 %
FEV1 REF: 3.34
FEV1 VOL CHG: 0.02
FRCPLETH LLN: 2.59
FRCPLETH PREREF: 109.6 %
FRCPLETH REF: 3.58
FRCPLETHULN: 4.57
FVC CHG: -1.5 %
FVC LLN: 3.28
FVC PRE REF: 118.7 %
FVC REF: 4.35
FVC VOL CHG: -0.08
IVC PRE: 4.72 L (ref 3.28–5.42)
IVC SINGLE BREATH LLN: 3.28
IVC SINGLE BREATH PRE REF: 108.6 %
IVC SINGLE BREATH REF: 4.35
IVCSINGLEBREATHULN: 5.42
LLN IC: -9999996.85
PEF LLN: 6.52
PEF PRE REF: 134.5 %
PEF REF: 8.77
PHYSICIAN COMMENT: ABNORMAL
POST FEF 25 75: 4.18 L/S (ref 1.28–4.68)
POST FET 100: 6.78 SEC
POST FEV1 FVC: 80.22 % (ref 64.59–88.05)
POST FEV1: 4.08 L (ref 2.48–4.15)
POST FEV5: 3.43 L (ref 1.53–3.8)
POST FVC: 5.08 L (ref 3.28–5.42)
POST PEF: 11.44 L/S (ref 6.52–11.02)
PRE DLCO: 21.21 ML/(MIN*MMHG) (ref 20.79–34.65)
PRE ERV: 1.52 L (ref -16448.87–16451.13)
PRE FEF 25 75: 3.71 L/S (ref 1.28–4.68)
PRE FET 100: 6.42 SEC
PRE FEV05 REF: 125.8 %
PRE FEV1 FVC: 78.66 % (ref 64.59–88.05)
PRE FEV1: 4.06 L (ref 2.48–4.15)
PRE FEV5: 3.36 L (ref 1.53–3.8)
PRE FRC PL: 3.92 L (ref 2.59–4.57)
PRE FVC: 5.16 L (ref 3.28–5.42)
PRE IC: 3.64 L (ref -9999996.85–#######.####)
PRE PEF: 11.79 L/S (ref 6.52–11.02)
PRE REF IC: 115.4 %
PRE RV: 2.4 L (ref 1.78–3.13)
PRE TLC: 7.56 L (ref 5.77–8.07)
RAW PRE REF: 44.1 %
RAW PRE: 1.35 CMH2O*S/L (ref 3.06–3.06)
RAW REF: 3.06
REF IC: 3.15
RV LLN: 1.78
RV PRE REF: 97.9 %
RV REF: 2.45
RVTLC LLN: 30
RVTLC PRE REF: 82.4 %
RVTLC PRE: 31.75 % (ref 29.55–47.51)
RVTLC REF: 39
RVTLCULN: 48
RVULN: 3.13
SGAW PRE REF: 191.2 %
SGAW PRE: 0.16 1/(CMH2O*S) (ref 0.08–0.08)
SGAW REF: 0.08
TLC LLN: 5.77
TLC PRE REF: 109.2 %
TLC REF: 6.92
TLC ULN: 8.07
ULN IC: ABNORMAL
VA PRE: 6.48 L (ref 6.77–6.77)
VA SINGLE BREATH LLN: 6.77
VA SINGLE BREATH PRE REF: 95.6 %
VA SINGLE BREATH REF: 6.77
VASINGLEBREATHULN: 6.77
VC LLN: 3.28
VC PRE REF: 118.7 %
VC PRE: 5.16 L (ref 3.28–5.42)
VC REF: 4.35
VC ULN: 5.42

## 2023-08-16 PROCEDURE — 94726 PLETHYSMOGRAPHY LUNG VOLUMES: CPT | Mod: S$GLB,,, | Performed by: INTERNAL MEDICINE

## 2023-08-16 PROCEDURE — 3074F PR MOST RECENT SYSTOLIC BLOOD PRESSURE < 130 MM HG: ICD-10-PCS | Mod: CPTII,S$GLB,, | Performed by: INTERNAL MEDICINE

## 2023-08-16 PROCEDURE — 1159F MED LIST DOCD IN RCRD: CPT | Mod: CPTII,S$GLB,, | Performed by: INTERNAL MEDICINE

## 2023-08-16 PROCEDURE — 3044F PR MOST RECENT HEMOGLOBIN A1C LEVEL <7.0%: ICD-10-PCS | Mod: CPTII,S$GLB,, | Performed by: INTERNAL MEDICINE

## 2023-08-16 PROCEDURE — 3078F DIAST BP <80 MM HG: CPT | Mod: CPTII,S$GLB,, | Performed by: INTERNAL MEDICINE

## 2023-08-16 PROCEDURE — 94618 PULMONARY STRESS TESTING: CPT | Mod: S$GLB,,, | Performed by: INTERNAL MEDICINE

## 2023-08-16 PROCEDURE — 99204 OFFICE O/P NEW MOD 45 MIN: CPT | Mod: 25,S$GLB,, | Performed by: INTERNAL MEDICINE

## 2023-08-16 PROCEDURE — 94060 EVALUATION OF WHEEZING: CPT | Mod: S$GLB,,, | Performed by: INTERNAL MEDICINE

## 2023-08-16 PROCEDURE — 94618 PULMONARY STRESS TESTING: ICD-10-PCS | Mod: S$GLB,,, | Performed by: INTERNAL MEDICINE

## 2023-08-16 PROCEDURE — 99999 PR PBB SHADOW E&M-EST. PATIENT-LVL III: ICD-10-PCS | Mod: PBBFAC,,, | Performed by: INTERNAL MEDICINE

## 2023-08-16 PROCEDURE — 3008F BODY MASS INDEX DOCD: CPT | Mod: CPTII,S$GLB,, | Performed by: INTERNAL MEDICINE

## 2023-08-16 PROCEDURE — 99204 PR OFFICE/OUTPT VISIT, NEW, LEVL IV, 45-59 MIN: ICD-10-PCS | Mod: 25,S$GLB,, | Performed by: INTERNAL MEDICINE

## 2023-08-16 PROCEDURE — 94729 PR C02/MEMBANE DIFFUSE CAPACITY: ICD-10-PCS | Mod: S$GLB,,, | Performed by: INTERNAL MEDICINE

## 2023-08-16 PROCEDURE — 3008F PR BODY MASS INDEX (BMI) DOCUMENTED: ICD-10-PCS | Mod: CPTII,S$GLB,, | Performed by: INTERNAL MEDICINE

## 2023-08-16 PROCEDURE — 3078F PR MOST RECENT DIASTOLIC BLOOD PRESSURE < 80 MM HG: ICD-10-PCS | Mod: CPTII,S$GLB,, | Performed by: INTERNAL MEDICINE

## 2023-08-16 PROCEDURE — 94060 PR EVAL OF BRONCHOSPASM: ICD-10-PCS | Mod: S$GLB,,, | Performed by: INTERNAL MEDICINE

## 2023-08-16 PROCEDURE — 1159F PR MEDICATION LIST DOCUMENTED IN MEDICAL RECORD: ICD-10-PCS | Mod: CPTII,S$GLB,, | Performed by: INTERNAL MEDICINE

## 2023-08-16 PROCEDURE — 94726 PULM FUNCT TST PLETHYSMOGRAP: ICD-10-PCS | Mod: S$GLB,,, | Performed by: INTERNAL MEDICINE

## 2023-08-16 PROCEDURE — 94729 DIFFUSING CAPACITY: CPT | Mod: S$GLB,,, | Performed by: INTERNAL MEDICINE

## 2023-08-16 PROCEDURE — 3074F SYST BP LT 130 MM HG: CPT | Mod: CPTII,S$GLB,, | Performed by: INTERNAL MEDICINE

## 2023-08-16 PROCEDURE — 99999 PR PBB SHADOW E&M-EST. PATIENT-LVL III: CPT | Mod: PBBFAC,,, | Performed by: INTERNAL MEDICINE

## 2023-08-16 PROCEDURE — 3044F HG A1C LEVEL LT 7.0%: CPT | Mod: CPTII,S$GLB,, | Performed by: INTERNAL MEDICINE

## 2023-08-16 RX ORDER — HYDROCORTISONE 25 MG/G
CREAM TOPICAL
COMMUNITY
Start: 2023-07-29

## 2023-08-16 NOTE — PROCEDURES
Waldo English is a 63 y.o.  male patient, who presents for a 6 minute walk test ordered by MD Karina.  The diagnosis is Interstitial Lung Disease.  The patient's BMI is 25.7 kg/m2.  Predicted distance (lower limit of normal) is 405.6 meters.      Test Results:    The test was completed without stopping. The total time walked was 360 seconds. During walking, the patient reported:  No complaints. The patient used no assistive devices during testing.     08/16/2023---------Distance: 548.64 meters (1800 feet)     O2 Sat % Supplemental Oxygen Heart Rate Blood Pressure Maegan Scale   Pre-exercise  (Resting) 97 % Room Air 83 bpm 134/62 mmHg 0   During Exercise 98 % Room Air 115 bpm 162/77 mmHg 1   Post-exercise  (Recovery) 98 % Room Air  88 bpm 141/68 mmHg      Recovery Time: 118 seconds    Performing nurse/tech: DESHAWN Franklin      PREVIOUS STUDY:   The patient has not had a previous study.      CLINICAL INTERPRETATION:  Six minute walk distance is 548.64 meters (1800 feet) with very light dyspnea.  During exercise, there was no desaturation while breathing room air.  Both blood pressure and heart rate increased significantly with walking.  The patient did not report non-pulmonary symptoms during exercise.  No previous study performed.  Based upon age and body mass index, exercise capacity is normal.

## 2023-08-16 NOTE — PROGRESS NOTES
Subjective:       Patient ID: Waldo English is a 63 y.o. male.    Chief Complaint: Bronchiectasis    HPI:   Waldo English is a 63 y.o. male new to me who presents for evaluation of ILD.    Relatively asymptomatic from a respiratory standpoint. Does exercise including swimming with good sats and HR. Denies recurrent URI/LRTIs. Denies nighttime symptoms. Does describe a subtle, dry cough that he notices after a deep    Measures his oxygenation regularly and states it is >97% even with activity.    Denies chest pain, orthopnea, PND, LE edema, palpitations, syncope/presyncope    + GERD sx somewhat controlled. Has undergone an extensive w/u in the past.    Denies f/c/ns, weight loss, malaise, fatigue    Denies rashes, myalgias, arthralgias, hair/nail changes, dysphagia, eye changes, raynauds, mucosal ulcerations    Exposures:  Work- Indoors  Tobacco- 1/2 ppd for 20 years, quit in   Inhalational Agents- Denies  Mold- Denies  Pets- Dog  Birds- Denies  Hot tubs- Denies  Medications- N/a  Childhood history of Lung Disease: Denies    Mother reportedly had IPF in her 80s but was on amiodarone at the time and had GGOs.    Review of Systems    As above    Social History     Tobacco Use    Smoking status: Former     Current packs/day: 0.00     Average packs/day: 0.5 packs/day for 20.0 years (10.0 ttl pk-yrs)     Types: Cigarettes     Start date: 1980     Quit date: 2000     Years since quittin.3     Passive exposure: Never    Smokeless tobacco: Never   Substance Use Topics    Alcohol use: Yes     Comment: Rarely, maybe 1-2 drinks a week if that       Review of patient's allergies indicates:  No Known Allergies  Past Medical History:   Diagnosis Date    BPH (benign prostatic hypertrophy) 2015    Chronic rhinitis 2015    Diverticulosis of large intestine without hemorrhage: see colonoscopy 9/17 3/26/2018    Family history of colon cancer: father age 65 2015    Former smoker 1/2 pack daily for 10  "years quit 2000 4/29/2015    GERD (gastroesophageal reflux disease) 4/29/2015    Hyperlipidemia 4/29/2015    Mitral regurgitation 4/22/2016    Rhinitis 4/29/2015     Past Surgical History:   Procedure Laterality Date    cataract surgery      April then August 2018 bilateral    EXCISION OF GANGLION CYST OF HAND Right 1/19/2022    Procedure: EXCISION, GANGLION CYST, HAND;  Surgeon: Lennox Verdin Jr., MD;  Location: [facility];  Service: Orthopedics;  Laterality: Right;  thumb     Current Outpatient Medications on File Prior to Visit   Medication Sig    aspirin 81 MG Chew Take 1 tablet (81 mg total) by mouth once daily.    atorvastatin (LIPITOR) 20 MG tablet Take 1 tablet (20 mg total) by mouth once daily.    famotidine (PEPCID) 20 MG tablet Take 20 mg by mouth every other day.    finasteride (PROSCAR) 5 mg tablet Take 1 tablet (5 mg total) by mouth once daily.    fluticasone propionate (FLONASE) 50 mcg/actuation nasal spray INHALE 2 SPRAYS INTO EACH NOSTRIL TWICE DAILY    glucosamine/msm/csa/samantha/eif068 (GLUCOSAMIN-CHOND-MSM-SAMANTHA-115HC ORAL) Take by mouth.    hydrocortisone 2.5 % cream     RABEprazole (ACIPHEX) 20 mg tablet TAKE 1 TABLET EVERY OTHER DAY     No current facility-administered medications on file prior to visit.       Objective:      Vitals:    08/16/23 0939   BP: 119/68   BP Location: Right arm   Patient Position: Sitting   Pulse: 87   SpO2: 98%   Weight: 79 kg (174 lb 2.6 oz)   Height: 5' 10" (1.778 m)     Physical Exam   Constitutional: He appears well-developed and well-nourished.   HENT:   Nose: No mucosal edema.   Mouth/Throat: Oropharynx is clear and moist. Mallampati Score: I.   Neck: No tracheal deviation present.   Cardiovascular: Normal rate, regular rhythm and intact distal pulses.   Pulmonary/Chest: Normal expansion, symmetric chest wall expansion, effort normal and breath sounds normal. No respiratory distress. He has no wheezes. He has no rhonchi. He has no rales.   Abdominal: He exhibits no " distension.   Musculoskeletal:         General: No edema.      Cervical back: Neck supple.   Lymphadenopathy: No supraclavicular adenopathy is present.     He has no cervical adenopathy.   Skin: Skin is warm and dry. No cyanosis or erythema. Nails show no clubbing.   Nursing note and vitals reviewed.      Personal Diagnostic Review    Laboratory:  23  Bicarb- 27        CT Chest 23- Images personally reviewed. I agree w/ the radiologist who notes;  Subpleural reticulations with traction bronchiectasis most pronounced within the lower lungs without evidence for honeycombing.  Findings are most consistent with subpleural fibrotic interstitial lung abnormality and correlation with patient's clinical findings is recommended.     Subcentimeter hepatic hypodensities, likely small cysts but too small to adequately characterize.    CXR 22- Images personally reviewed. I agree w/ the radiologist who notes;  No acute process    PFTs   23  FVC: 5.16 (118.7 % predicted), FEV1: 4.06 (121.4 % predicted), FEV1/FVC:  79, T.56 (109.2 % predicted), DLCO: 21.21 (76.5 % predicted)    2023---------Distance: 548.64 meters (1800 feet)       O2 Sat % Supplemental Oxygen Heart Rate Blood Pressure Maegan Scale   Pre-exercise  (Resting) 97 % Room Air 83 bpm 134/62 mmHg 0   During Exercise 98 % Room Air 115 bpm 162/77 mmHg 1   Post-exercise  (Recovery) 98 % Room Air  88 bpm 141/68 mmHg        Recovery Time: 118 seconds    Stress TTE 21  The patient exercised for 7 minutes and 4 seconds on a high ramp protocol, corresponding to a functional capacity of 12 METS, achieving a peak heart rate of 160 bpm, which is 101% of the age predicted maximum heart rate.  There were no arrhythmias during stress.  The patient's exercise capacity was normal.  The test was stopped because the patient experienced fatigue.  The ECG portion of this study is positive for myocardial ischemia.  The left ventricle is normal in size with  "normal systolic function.  The estimated ejection fraction is 60%.  Normal right ventricular size with normal right ventricular systolic function.  Normal left ventricular diastolic function.  The estimated PA systolic pressure is 31 mmHg.  Normal central venous pressure (3 mmHg).  The stress echo portion of this study is negative for myocardial ischemia.        8/16/2023    11:16 AM 8/16/2023     9:39 AM 7/27/2023     3:14 PM 5/17/2023     8:50 AM 11/2/2022     4:02 PM 6/15/2022     3:44 PM 6/1/2022    10:53 AM   Pulmonary Function Tests   SpO2  98 % 100 %       Ordering Provider MD Karina         Performing nurse/tech/RT DESHAWN Franklin         Diagnosis Interstitial Lung Disease         Height 5' 9" (1.753 m) 5' 10" (1.778 m)  5' 10" (1.778 m) 5' 10" (1.778 m) 5' 10" (1.778 m) 5' 10" (1.778 m)   Weight 79 kg (174 lb 2.6 oz) 79 kg (174 lb 2.6 oz) 78.9 kg (173 lb 15.1 oz) 78.9 kg (174 lb) 78.9 kg (174 lb) 79.8 kg (176 lb) 79.8 kg (176 lb)   BMI (Calculated) 25.7 25  25 25 25.3 25.3   Patient Race          6MWT Status completed without stopping         Patient Reported No complaints         Was O2 used? No         6MW Distance walked (feet) 1800 feet         Distance walked (meters) 548.64 meters         Did patient stop? No         Type of assistive device(s) used? no assistive devices         Oxygen Saturation 97 %         Supplemental Oxygen Room Air         Heart Rate 83 bpm         Blood Pressure 134/62         Maegan Dyspnea Rating  nothing at all         Oxygen Saturation 98 %         Supplemental Oxygen Room Air         Heart Rate 115 bpm         Blood Pressure 162/77         Maeagn Dyspnea Rating  very light         Recovery Time (seconds) 118 seconds         Oxygen Saturation 98 %         Supplemental Oxygen Room Air         Heart Rate 88 bpm         Blood Pressure 141/68         Is procedure ready for interpretation? Yes         Oxygen Qualification? No                 Assessment:     Problem List " Items Addressed This Visit          Pulmonary    Interstitial lung disease - Primary     Incidentally found on CT Cardiac. Mild on imaging w/ absent respiratory symptoms and normal PFTs. 6 MWT today without desaturation. No s/s of CT disease. Remote smoking history. No other significant exposures.    Favor a conservative approach w/ monitoring of respiratory symptoms and PFTs    Cont excellent exercise regimen         Relevant Orders    Stress test, pulmonary (Completed)       45 minutes of total time spent on the encounter, which includes face to face time and non-face to face time preparing to see the patient (eg, review of tests), Obtaining and/or reviewing separately obtained history, Documenting clinical information in the electronic or other health record, Independently interpreting results (not separately reported) and communicating results to the patient/family/caregiver, or Care coordination (not separately reported).

## 2023-08-24 PROBLEM — J47.9 BRONCHIECTASIS WITHOUT COMPLICATION: Status: RESOLVED | Noted: 2023-07-28 | Resolved: 2023-08-24

## 2023-08-24 NOTE — ASSESSMENT & PLAN NOTE
Incidentally found on CT Cardiac. Mild on imaging w/ absent respiratory symptoms and normal PFTs. 6 MWT today without desaturation. No s/s of CT disease. Remote smoking history. No other significant exposures.    Favor a conservative approach w/ monitoring of respiratory symptoms and PFTs    Cont excellent exercise regimen

## 2023-08-25 ENCOUNTER — HOSPITAL ENCOUNTER (OUTPATIENT)
Dept: CARDIOLOGY | Facility: HOSPITAL | Age: 63
Discharge: HOME | End: 2023-08-25
Attending: INTERNAL MEDICINE
Payer: COMMERCIAL

## 2023-08-25 ENCOUNTER — TELEPHONE (OUTPATIENT)
Dept: INTERNAL MEDICINE | Facility: CLINIC | Age: 63
End: 2023-08-25
Payer: COMMERCIAL

## 2023-08-25 VITALS
BODY MASS INDEX: 25.77 KG/M2 | DIASTOLIC BLOOD PRESSURE: 70 MMHG | WEIGHT: 174 LBS | SYSTOLIC BLOOD PRESSURE: 118 MMHG | HEART RATE: 70 BPM | HEIGHT: 69 IN

## 2023-08-25 LAB
ASCENDING AORTA: 2.67 CM
AV INDEX (PROSTH): 0.91
AV MEAN GRADIENT: 4 MMHG
AV PEAK GRADIENT: 8 MMHG
AV VALVE AREA BY VELOCITY RATIO: 2.88 CM²
AV VALVE AREA: 2.71 CM²
AV VELOCITY RATIO: 0.96
BSA FOR ECHO PROCEDURE: 1.96 M2
CV ECHO LV RWT: 0.27 CM
DOP CALC AO PEAK VEL: 1.37 M/S
DOP CALC AO VTI: 29.61 CM
DOP CALC LVOT AREA: 3 CM2
DOP CALC LVOT DIAMETER: 1.95 CM
DOP CALC LVOT PEAK VEL: 1.32 M/S
DOP CALC LVOT STROKE VOLUME: 80.27 CM3
DOP CALC MV VTI: 21.12 CM
DOP CALCLVOT PEAK VEL VTI: 26.89 CM
E WAVE DECELERATION TIME: 255.39 MSEC
E/A RATIO: 1.22
E/E' RATIO: 5.33 M/S
ECHO LV POSTERIOR WALL: 0.65 CM (ref 0.6–1.1)
FRACTIONAL SHORTENING: 39 % (ref 28–44)
INTERVENTRICULAR SEPTUM: 0.62 CM (ref 0.6–1.1)
LA MAJOR: 4 CM
LA MINOR: 4.03 CM
LA WIDTH: 2.95 CM
LEFT ATRIUM SIZE: 3.44 CM
LEFT ATRIUM VOLUME INDEX MOD: 16.4 ML/M2
LEFT ATRIUM VOLUME INDEX: 17.8 ML/M2
LEFT ATRIUM VOLUME MOD: 32.05 CM3
LEFT ATRIUM VOLUME: 34.63 CM3
LEFT INTERNAL DIMENSION IN SYSTOLE: 2.89 CM (ref 2.1–4)
LEFT VENTRICLE DIASTOLIC VOLUME INDEX: 53.21 ML/M2
LEFT VENTRICLE DIASTOLIC VOLUME: 103.75 ML
LEFT VENTRICLE MASS INDEX: 47 G/M2
LEFT VENTRICLE SYSTOLIC VOLUME INDEX: 16.3 ML/M2
LEFT VENTRICLE SYSTOLIC VOLUME: 31.86 ML
LEFT VENTRICULAR INTERNAL DIMENSION IN DIASTOLE: 4.73 CM (ref 3.5–6)
LEFT VENTRICULAR MASS: 92.27 G
LV LATERAL E/E' RATIO: 4.8 M/S
LV SEPTAL E/E' RATIO: 6 M/S
MV A" WAVE DURATION": 9.42 MSEC
MV MEAN GRADIENT: 1 MMHG
MV PEAK A VEL: 0.59 M/S
MV PEAK E VEL: 0.72 M/S
MV PEAK GRADIENT: 2 MMHG
MV STENOSIS PRESSURE HALF TIME: 74.06 MS
MV VALVE AREA BY CONTINUITY EQUATION: 3.8 CM2
MV VALVE AREA P 1/2 METHOD: 2.97 CM2
PISA TR MAX VEL: 2.56 M/S
PULM VEIN S/D RATIO: 1.73
PV PEAK D VEL: 0.26 M/S
PV PEAK S VEL: 0.45 M/S
RA MAJOR: 3.34 CM
RA PRESSURE ESTIMATED: 3 MMHG
RA WIDTH: 2.85 CM
RIGHT VENTRICULAR END-DIASTOLIC DIMENSION: 2.95 CM
RV TB RVSP: 6 MMHG
SINUS: 2.65 CM
STJ: 2.6 CM
TDI LATERAL: 0.15 M/S
TDI SEPTAL: 0.12 M/S
TDI: 0.14 M/S
TR MAX PG: 26 MMHG
TRICUSPID ANNULAR PLANE SYSTOLIC EXCURSION: 1.85 CM
TV REST PULMONARY ARTERY PRESSURE: 29 MMHG
Z-SCORE OF LEFT VENTRICULAR DIMENSION IN END DIASTOLE: -1.62
Z-SCORE OF LEFT VENTRICULAR DIMENSION IN END SYSTOLE: -1.33

## 2023-08-25 PROCEDURE — 93306 TTE W/DOPPLER COMPLETE: CPT | Mod: 26,,, | Performed by: INTERNAL MEDICINE

## 2023-08-25 PROCEDURE — 93306 TTE W/DOPPLER COMPLETE: CPT

## 2023-08-25 PROCEDURE — 93306 ECHO (CUPID ONLY): ICD-10-PCS | Mod: 26,,, | Performed by: INTERNAL MEDICINE

## 2023-08-25 NOTE — TELEPHONE ENCOUNTER
----- Message from Lennoxsindy Alicia Homar sent at 8/25/2023 10:27 AM CDT -----  Contact: [phone]  [email or web address]     Patient is calling for Medical Advice regarding: cardiology dept is calling to get more info on the echo test that is scheduled for pt. Looks like he scheduled it himself with no order. They found a order and attached it to the appt but still is not sure why this was scheduled by pt. Would like a call to f/u. Wants to make sure doc is not requesting a stress test because that what it was scheduled under.     How long has patient had these symptoms:    Pharmacy name and phone#:    Would like response via [facility]:  no    Comments:

## 2023-11-21 ENCOUNTER — TELEPHONE (OUTPATIENT)
Dept: PHARMACY | Facility: CLINIC | Age: 63
End: 2023-11-21
Payer: COMMERCIAL

## 2024-02-07 ENCOUNTER — PATIENT MESSAGE (OUTPATIENT)
Dept: PULMONOLOGY | Facility: CLINIC | Age: 64
End: 2024-02-07
Payer: COMMERCIAL

## 2024-04-11 DIAGNOSIS — J84.9 INTERSTITIAL LUNG DISEASE: Primary | ICD-10-CM

## 2024-04-15 DIAGNOSIS — Z00.00 ROUTINE MEDICAL EXAM: Primary | ICD-10-CM

## 2024-04-19 ENCOUNTER — TELEPHONE (OUTPATIENT)
Dept: PULMONOLOGY | Facility: CLINIC | Age: 64
End: 2024-04-19
Payer: COMMERCIAL

## 2024-04-19 NOTE — TELEPHONE ENCOUNTER
I spoke with patient and added pft's and 6mwt prior to appointment with Dr Collins on 4/30/24 at 8:30am per Dr Collins. Patient will arrive at 7:30am. Patient confirmed and verbalized understanding.

## 2024-04-30 ENCOUNTER — OFFICE VISIT (OUTPATIENT)
Dept: PULMONOLOGY | Facility: CLINIC | Age: 64
End: 2024-04-30
Payer: COMMERCIAL

## 2024-04-30 ENCOUNTER — HOSPITAL ENCOUNTER (OUTPATIENT)
Dept: PULMONOLOGY | Facility: CLINIC | Age: 64
Discharge: HOME | End: 2024-04-30
Payer: COMMERCIAL

## 2024-04-30 VITALS — WEIGHT: 175.94 LBS | HEIGHT: 69 IN | BODY MASS INDEX: 26.06 KG/M2

## 2024-04-30 VITALS
SYSTOLIC BLOOD PRESSURE: 118 MMHG | DIASTOLIC BLOOD PRESSURE: 79 MMHG | WEIGHT: 175.94 LBS | BODY MASS INDEX: 26.06 KG/M2 | HEIGHT: 69 IN

## 2024-04-30 DIAGNOSIS — J84.9 INTERSTITIAL LUNG DISEASE: ICD-10-CM

## 2024-04-30 DIAGNOSIS — J84.9 INTERSTITIAL LUNG DISEASE: Primary | ICD-10-CM

## 2024-04-30 LAB
DLCO SINGLE BREATH LLN: 20.6
DLCO SINGLE BREATH PRE REF: 73.4 %
DLCO SINGLE BREATH REF: 27.52
DLCOC SBVA LLN: 2.77
DLCOC SBVA REF: 3.98
DLCOC SINGLE BREATH LLN: 20.6
DLCOC SINGLE BREATH REF: 27.52
DLCOCSBVAULN: 5.18
DLCOCSINGLEBREATHULN: 34.45
DLCOSINGLEBREATHULN: 34.45
DLCOVA LLN: 2.77
DLCOVA PRE REF: 78.8 %
DLCOVA PRE: 3.13 ML/(MIN*MMHG*L) (ref 2.77–5.18)
DLCOVA REF: 3.98
DLCOVAULN: 5.18
ERV LLN: -16448.89
ERV PRE REF: 148.2 %
ERV REF: 1.11
ERVULN: ABNORMAL
FEF 25 75 LLN: 1.25
FEF 25 75 PRE REF: 97 %
FEF 25 75 REF: 2.68
FEV05 LLN: 1.52
FEV05 REF: 2.65
FEV1 FVC LLN: 64
FEV1 FVC PRE REF: 97.6 %
FEV1 FVC REF: 77
FEV1 LLN: 2.46
FEV1 PRE REF: 113.3 %
FEV1 REF: 3.32
FRCPLETH LLN: 2.6
FRCPLETH PREREF: 102.5 %
FRCPLETH REF: 3.59
FRCPLETHULN: 4.57
FVC LLN: 3.26
FVC PRE REF: 115.9 %
FVC REF: 4.32
IVC PRE: 4.8 L (ref 3.26–5.4)
IVC SINGLE BREATH LLN: 3.26
IVC SINGLE BREATH PRE REF: 111.1 %
IVC SINGLE BREATH REF: 4.32
IVCSINGLEBREATHULN: 5.4
LLN IC: -9999996.86
PEF LLN: 6.43
PEF PRE REF: 134 %
PEF REF: 8.68
PHYSICIAN COMMENT: ABNORMAL
PRE DLCO: 20.2 ML/(MIN*MMHG) (ref 20.6–34.45)
PRE ERV: 1.65 L (ref -16448.89–16451.11)
PRE FEF 25 75: 2.6 L/S (ref 1.25–4.64)
PRE FET 100: 9.33 SEC
PRE FEV05 REF: 116.9 %
PRE FEV1 FVC: 75.12 % (ref 64.38–88.05)
PRE FEV1: 3.76 L (ref 2.46–4.13)
PRE FEV5: 3.1 L (ref 1.52–3.79)
PRE FRC PL: 3.68 L (ref 2.6–4.57)
PRE FVC: 5 L (ref 3.26–5.4)
PRE IC: 3.35 L (ref -9999996.86–#######.####)
PRE PEF: 11.64 L/S (ref 6.43–10.94)
PRE REF IC: 107 %
PRE RV: 2.03 L (ref 1.8–3.15)
PRE TLC: 7.03 L (ref 5.77–8.07)
RAW PRE REF: 65 %
RAW PRE: 1.99 CMH2O*S/L (ref 3.06–3.06)
RAW REF: 3.06
REF IC: 3.14
RV LLN: 1.8
RV PRE REF: 82 %
RV REF: 2.47
RVTLC LLN: 30
RVTLC PRE REF: 74.1 %
RVTLC PRE: 28.83 % (ref 29.94–47.9)
RVTLC REF: 39
RVTLCULN: 48
RVULN: 3.15
SGAW PRE REF: 139.4 %
SGAW PRE: 0.12 1/(CMH2O*S) (ref 0.08–0.08)
SGAW REF: 0.08
TLC LLN: 5.77
TLC PRE REF: 101.6 %
TLC REF: 6.92
TLC ULN: 8.07
ULN IC: ABNORMAL
VA PRE: 6.45 L (ref 6.77–6.77)
VA SINGLE BREATH LLN: 6.77
VA SINGLE BREATH PRE REF: 95.3 %
VA SINGLE BREATH REF: 6.77
VASINGLEBREATHULN: 6.77
VC LLN: 3.26
VC PRE REF: 115.9 %
VC PRE: 5 L (ref 3.26–5.4)
VC REF: 4.32
VC ULN: 5.4

## 2024-04-30 PROCEDURE — 3008F BODY MASS INDEX DOCD: CPT | Mod: CPTII,S$GLB,, | Performed by: INTERNAL MEDICINE

## 2024-04-30 PROCEDURE — 94618 PULMONARY STRESS TESTING: CPT | Mod: S$GLB,,, | Performed by: INTERNAL MEDICINE

## 2024-04-30 PROCEDURE — 99214 OFFICE O/P EST MOD 30 MIN: CPT | Mod: 25,S$GLB,, | Performed by: INTERNAL MEDICINE

## 2024-04-30 PROCEDURE — 94010 BREATHING CAPACITY TEST: CPT | Mod: 59,S$GLB,, | Performed by: INTERNAL MEDICINE

## 2024-04-30 PROCEDURE — 94726 PLETHYSMOGRAPHY LUNG VOLUMES: CPT | Mod: S$GLB,,, | Performed by: INTERNAL MEDICINE

## 2024-04-30 PROCEDURE — 94729 DIFFUSING CAPACITY: CPT | Mod: S$GLB,,, | Performed by: INTERNAL MEDICINE

## 2024-04-30 PROCEDURE — 3074F SYST BP LT 130 MM HG: CPT | Mod: CPTII,S$GLB,, | Performed by: INTERNAL MEDICINE

## 2024-04-30 PROCEDURE — 99999 PR PBB SHADOW E&M-EST. PATIENT-LVL III: CPT | Mod: PBBFAC,,, | Performed by: INTERNAL MEDICINE

## 2024-04-30 PROCEDURE — 3078F DIAST BP <80 MM HG: CPT | Mod: CPTII,S$GLB,, | Performed by: INTERNAL MEDICINE

## 2024-04-30 NOTE — PROGRESS NOTES
Subjective:       Patient ID: Waldo English is a 64 y.o. male.    Chief Complaint: Interstitial Lung Disease    HPI:   Waldo English is a 64 y.o. male last seen by me 23 who presents in f/u for ILD.    Today:  Respiratory symptoms are relatively stable. Does report a mild, dry cough that occurs w/ deep inspiration/laughing. Using IS in the am which will trigger it as well. Denies cough with activity.     Was not swimming over the winter but is going to restart soon. Stays very active and denies dyspnea or coughing with activity.     No new ROS or exposures.    23 HPI:  ILD incidentally noted on CT Cardiac. Relatively asymptomatic from a respiratory standpoint. Does exercise including swimming with good sats and HR. Denies recurrent URI/LRTIs. Denies nighttime symptoms. Does describe a subtle, dry cough that he notices after a deep    Measures his oxygenation regularly and states it is >97% even with activity.    Denies chest pain, orthopnea, PND, LE edema, palpitations, syncope/presyncope    + GERD sx somewhat controlled. Has undergone an extensive w/u in the past.    Denies f/c/ns, weight loss, malaise, fatigue    Denies rashes, myalgias, arthralgias, hair/nail changes, dysphagia, eye changes, raynauds, mucosal ulcerations    Exposures:  Work- Indoors  Tobacco- 1/2 ppd for 20 years, quit in   Inhalational Agents- Denies  Mold- Denies  Pets- Dog  Birds- Denies  Hot tubs- Denies  Medications- N/a  Childhood history of Lung Disease: Denies    Mother reportedly had IPF in her 80s but was on amiodarone at the time and had GGOs.    Review of Systems    As above    Social History     Tobacco Use    Smoking status: Former     Current packs/day: 0.00     Average packs/day: 0.5 packs/day for 20.0 years (10.0 ttl pk-yrs)     Types: Cigarettes     Start date: 1980     Quit date: 2000     Years since quittin.0     Passive exposure: Never    Smokeless tobacco: Never   Substance Use Topics    Alcohol  "use: Yes     Comment: Rarely, maybe 1-2 drinks a week if that       Review of patient's allergies indicates:  No Known Allergies  Past Medical History:   Diagnosis Date    BPH (benign prostatic hypertrophy) 4/29/2015    Chronic rhinitis 4/29/2015    Diverticulosis of large intestine without hemorrhage: see colonoscopy 9/17 3/26/2018    Family history of colon cancer: father age 65 4/29/2015    Former smoker 1/2 pack daily for 10 years quit 2000 4/29/2015    GERD (gastroesophageal reflux disease) 4/29/2015    Hyperlipidemia 4/29/2015    Mitral regurgitation 4/22/2016    Rhinitis 4/29/2015     Past Surgical History:   Procedure Laterality Date    cataract surgery      April then August 2018 bilateral    EXCISION OF GANGLION CYST OF HAND Right 1/19/2022    Procedure: EXCISION, GANGLION CYST, HAND;  Surgeon: Lennox Verdin Jr., MD;  Location: [facility];  Service: Orthopedics;  Laterality: Right;  thumb     Current Outpatient Medications   Medication Sig Dispense Refill    aspirin 81 MG Chew Take 1 tablet (81 mg total) by mouth once daily.      atorvastatin (LIPITOR) 20 MG tablet Take 1 tablet (20 mg total) by mouth once daily. 90 tablet 1    famotidine (PEPCID) 20 MG tablet Take 20 mg by mouth every other day.      finasteride (PROSCAR) 5 mg tablet Take 1 tablet (5 mg total) by mouth once daily. 90 tablet 3    fluticasone propionate (FLONASE) 50 mcg/actuation nasal spray INHALE 2 SPRAYS INTO EACH NOSTRIL TWICE DAILY 48 g 3    glucosamine/msm/csa/samantha/gxg803 (GLUCOSAMIN-CHOND-MSM-SAMANTHA-115HC ORAL) Take by mouth.      hydrocortisone 2.5 % cream       pneumoc 20-yoselyn conj-dip cr,PF, (PREVNAR-20, PF,) 0.5 mL Syrg injection Inject into the muscle. 0.5 mL 0    RABEprazole (ACIPHEX) 20 mg tablet TAKE 1 TABLET EVERY OTHER DAY 45 tablet 3     No current facility-administered medications for this visit.       Objective:      Vitals:    04/30/24 0840   BP: 118/79   Weight: 79.8 kg (175 lb 14.8 oz)   Height: 5' 9" (1.753 m) "       Physical Exam   Constitutional: He appears well-developed and well-nourished.   HENT:   Nose: No mucosal edema.   Mouth/Throat: Oropharynx is clear and moist. Mallampati Score: I.   Neck: No tracheal deviation present.   Cardiovascular: Normal rate, regular rhythm and intact distal pulses.   Pulmonary/Chest: Normal expansion, symmetric chest wall expansion, effort normal and breath sounds normal. No respiratory distress. He has no wheezes. He has no rhonchi. He has no rales.   Abdominal: He exhibits no distension.   Musculoskeletal:         General: No edema.      Cervical back: Neck supple.   Lymphadenopathy: No supraclavicular adenopathy is present.     He has no cervical adenopathy.   Skin: Skin is warm and dry. No cyanosis or erythema. Nails show no clubbing.   Nursing note and vitals reviewed.      Personal Diagnostic Review    Laboratory:  23  Bicarb-         CT Chest 23- Images personally reviewed. I agree w/ the radiologist who notes;  Subpleural reticulations with traction bronchiectasis most pronounced within the lower lungs without evidence for honeycombing.  Findings are most consistent with subpleural fibrotic interstitial lung abnormality and correlation with patient's clinical findings is recommended.     Subcentimeter hepatic hypodensities, likely small cysts but too small to adequately characterize.    CXR 22- Images personally reviewed. I agree w/ the radiologist who notes;  No acute process    PFTs   24  FVC: 5.00 (115.9 % predicted), FEV1: 3.76 (113.3 % predicted), FEV1/FVC:  75, T.03 (101.6 % predicted), DLCO: 20.20 (73.4 % predicted)    23  FVC: 5.16 (118.7 % predicted), FEV1: 4.06 (121.4 % predicted), FEV1/FVC:  79, T.56 (109.2 % predicted), DLCO: 21.21 (76.5 % predicted)    2024---------Distance: 563.88 meters (1850 feet)       O2 Sat % Supplemental Oxygen Heart Rate Blood Pressure Maegan Scale   Pre-exercise  (Resting) 100 % Room Air 70 bpm  118/79 mmHg 0   During Exercise 98 % Room Air 105 bpm 156/89 mmHg 1   Post-exercise  (Recovery) 98 % Room Air  83 bpm 133/79 mmHg        Recovery Time: 146 seconds       08/16/2023---------Distance: 548.64 meters (1800 feet)       O2 Sat % Supplemental Oxygen Heart Rate Blood Pressure Maegan Scale   Pre-exercise  (Resting) 97 % Room Air 83 bpm 134/62 mmHg 0   During Exercise 98 % Room Air 115 bpm 162/77 mmHg 1   Post-exercise  (Recovery) 98 % Room Air  88 bpm 141/68 mmHg        Recovery Time: 118 seconds    TTE 8/25/23    Left Ventricle: The left ventricle is normal in size. Normal wall thickness. Normal wall motion. There is normal systolic function with a visually estimated ejection fraction of 60 - 65%. There is normal diastolic function.    Right Ventricle: Normal right ventricular cavity size. Wall thickness is normal. Right ventricle wall motion  is normal. Systolic function is normal.    Mitral Valve: There is mild regurgitation.    IVC/SVC: Normal venous pressure at 3 mmHg.    Stress TTE 9/20/21  The patient exercised for 7 minutes and 4 seconds on a high ramp protocol, corresponding to a functional capacity of 12 METS, achieving a peak heart rate of 160 bpm, which is 101% of the age predicted maximum heart rate.  There were no arrhythmias during stress.  The patient's exercise capacity was normal.  The test was stopped because the patient experienced fatigue.  The ECG portion of this study is positive for myocardial ischemia.  The left ventricle is normal in size with normal systolic function.  The estimated ejection fraction is 60%.  Normal right ventricular size with normal right ventricular systolic function.  Normal left ventricular diastolic function.  The estimated PA systolic pressure is 31 mmHg.  Normal central venous pressure (3 mmHg).  The stress echo portion of this study is negative for myocardial ischemia.          Assessment:     Problem List Items Addressed This Visit          Pulmonary     Interstitial lung disease - Primary     Incidentally found on CT Cardiac. Confirmed on CT Chest 7/27/23. Basilar predominant R>L subpleural reticulations with several areas of bronchiolectasis in the RLL but also with some areas of peripheral sparing. ROS is unremarkable for CTD. PFTs are normal and relatively stable. Only respiratory symptom is a mild cough on deep inspiration. Excellent exercise regimen. Did smoke 1/2ppd but quit in 2000.    - Cont serial monitoring of PFTs, 6MWT and respiratory sx  - CT Chest if there is a decline in function or worsening respiratory sx. Would also send serologic eval at that time as well.   - Cont excellent exercise regimen  - Cont conservative approach to GERD  - 6 mn f/u w/ PFTs and 6MWT            Relevant Orders    Lung Volumes    DLCO-Carbon Monoxide Diffusing Capacity    Spirometry without Bronchodilator    Stress test, pulmonary         30 minutes of total time spent on the encounter, which includes face to face time and non-face to face time preparing to see the patient (eg, review of tests), Obtaining and/or reviewing separately obtained history, Documenting clinical information in the electronic or other health record, Independently interpreting results (not separately reported) and communicating results to the patient/family/caregiver, or Care coordination (not separately reported).

## 2024-04-30 NOTE — ASSESSMENT & PLAN NOTE
Incidentally found on CT Cardiac. Confirmed on CT Chest 7/27/23. Basilar predominant R>L subpleural reticulations with several areas of bronchiolectasis in the RLL but also with some areas of peripheral sparing. ROS is unremarkable for CTD. PFTs are normal and relatively stable. Only respiratory symptom is a mild cough on deep inspiration. Excellent exercise regimen. Did smoke 1/2ppd but quit in 2000.    - Cont serial monitoring of PFTs, 6MWT and respiratory sx  - CT Chest if there is a decline in function or worsening respiratory sx. Would also send serologic eval at that time as well.   - Cont excellent exercise regimen  - Cont conservative approach to GERD  - 6 mn f/u w/ PFTs and 6MWT

## 2024-04-30 NOTE — PROCEDURES
Waldo English is a 64 y.o.  male patient, who presents for a 6 minute walk test ordered by MD Karina.  The diagnosis is Interstitial Lung Disease.  The patient's BMI is 26 kg/m2.  Predicted distance (lower limit of normal) is 396.98 meters.      Test Results:    The test was completed without stopping.  The total time walked was 360 seconds.  During walking, the patient reported:  No complaints. The patient used no assistive devices during testing.     04/30/2024---------Distance: 563.88 meters (1850 feet)     O2 Sat % Supplemental Oxygen Heart Rate Blood Pressure Maegan Scale   Pre-exercise  (Resting) 100 % Room Air 70 bpm 118/79 mmHg 0   During Exercise 98 % Room Air 105 bpm 156/89 mmHg 1   Post-exercise  (Recovery) 98 % Room Air  83 bpm 133/79 mmHg      Recovery Time: 146 seconds    Performing nurse/tech: Rigoberto HESS      PREVIOUS STUDY:   08/16/2023---------Distance: 548.64 meters (1800 feet)       O2 Sat % Supplemental Oxygen Heart Rate Blood Pressure Maegan Scale   Pre-exercise  (Resting) 97 % Room Air 83 bpm 134/62 mmHg 0   During Exercise 98 % Room Air 115 bpm 162/77 mmHg 1   Post-exercise  (Recovery) 98 % Room Air  88 bpm 141/68 mmHg         CLINICAL INTERPRETATION:  Six minute walk distance is 563.88 meters (1850 feet) with very light dyspnea.  During exercise, there was no significant desaturation while breathing room air.  Both blood pressure and heart rate increased significantly with walking.  The patient did not report non-pulmonary symptoms during exercise.  Since the previous study in August 2023, exercise capacity is unchanged.  Based upon age and body mass index, exercise capacity is normal.

## 2024-05-16 NOTE — PROGRESS NOTES
HPI    Referred by Dr. 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 Patient notes vision as   blurry. Patient notes difficulty with vision due to halos and star bursts.   Patient notes no eye pain.   Last edited by Jillian Tovar MD on 1/5/2023  4:14 PM.            Assessment /Plan     For exam results, see Encounter Report.    Visual disturbance  -     Posterior Segment OCT Retina-Both eyes  -     Posterior Segment OCT Optic Nerve- Both eyes    Macular scar, right  -     Posterior Segment OCT Retina-Both eyes  -     Posterior Segment OCT Optic Nerve- Both eyes    Status post cataract extraction and insertion of intraocular lens, left    Status post cataract extraction and insertion of intraocular lens, right      Patient reports doing well following cataract surgery done in approximately 2018, however then experienced some vision changes in the last year.  Notably images of different sizes, glare and halos, and a yellow tint in his peripheral vision out of his left eye.    Although patient did not have significant PCO, decision was made to proceed with the eye capsulotomy in the left eye with Dr. Kirby.  Patient notes no improvement following that procedure.    Anterior segment exam within normal limits today.  Topography shows symmetric oblique astigmatism in both eyes.  Lenses are clear and in good position.  Well centered    Explained that some of his patient's issues are related to the pre-existing macular scar in the right eye.  I can not explain his other visual disturbances.  Would recommend visual fields and evaluation with Neuro-Ophthalmology.                    98.5

## 2024-06-10 ENCOUNTER — PATIENT MESSAGE (OUTPATIENT)
Dept: INTERNAL MEDICINE | Facility: CLINIC | Age: 64
End: 2024-06-10
Payer: COMMERCIAL

## 2024-07-08 ENCOUNTER — HOSPITAL ENCOUNTER (OUTPATIENT)
Dept: CARDIOLOGY | Facility: CLINIC | Age: 64
Discharge: HOME | End: 2024-07-08
Payer: COMMERCIAL

## 2024-07-08 ENCOUNTER — CLINICAL SUPPORT (OUTPATIENT)
Dept: INTERNAL MEDICINE | Facility: CLINIC | Age: 64
End: 2024-07-08
Payer: COMMERCIAL

## 2024-07-08 ENCOUNTER — OFFICE VISIT (OUTPATIENT)
Dept: INTERNAL MEDICINE | Facility: CLINIC | Age: 64
End: 2024-07-08
Payer: COMMERCIAL

## 2024-07-08 VITALS
SYSTOLIC BLOOD PRESSURE: 110 MMHG | HEIGHT: 69 IN | BODY MASS INDEX: 26.07 KG/M2 | WEIGHT: 176 LBS | DIASTOLIC BLOOD PRESSURE: 60 MMHG

## 2024-07-08 DIAGNOSIS — N40.1 BENIGN NON-NODULAR PROSTATIC HYPERPLASIA WITH LOWER URINARY TRACT SYMPTOMS: ICD-10-CM

## 2024-07-08 DIAGNOSIS — G47.33 OSA (OBSTRUCTIVE SLEEP APNEA): ICD-10-CM

## 2024-07-08 DIAGNOSIS — Z00.00 ROUTINE MEDICAL EXAM: ICD-10-CM

## 2024-07-08 DIAGNOSIS — I25.10 CORONARY ARTERY DISEASE INVOLVING NATIVE CORONARY ARTERY OF NATIVE HEART WITHOUT ANGINA PECTORIS: ICD-10-CM

## 2024-07-08 DIAGNOSIS — R73.01 IFG (IMPAIRED FASTING GLUCOSE): ICD-10-CM

## 2024-07-08 DIAGNOSIS — K21.9 GASTROESOPHAGEAL REFLUX DISEASE WITHOUT ESOPHAGITIS: ICD-10-CM

## 2024-07-08 DIAGNOSIS — Z00.00 ROUTINE GENERAL MEDICAL EXAMINATION AT A HEALTH CARE FACILITY: Primary | ICD-10-CM

## 2024-07-08 DIAGNOSIS — Z80.0 FAMILY HISTORY OF COLON CANCER: ICD-10-CM

## 2024-07-08 DIAGNOSIS — Z00.00 ANNUAL PHYSICAL EXAM: Primary | ICD-10-CM

## 2024-07-08 DIAGNOSIS — I34.0 NON-RHEUMATIC MITRAL REGURGITATION: ICD-10-CM

## 2024-07-08 DIAGNOSIS — M79.646 THUMB PAIN, UNSPECIFIED LATERALITY: ICD-10-CM

## 2024-07-08 DIAGNOSIS — J84.9 INTERSTITIAL LUNG DISEASE: ICD-10-CM

## 2024-07-08 DIAGNOSIS — E78.2 MIXED HYPERLIPIDEMIA: ICD-10-CM

## 2024-07-08 DIAGNOSIS — Z87.891 FORMER SMOKER: ICD-10-CM

## 2024-07-08 LAB
ALBUMIN SERPL BCP-MCNC: 3.9 G/DL (ref 3.5–5.2)
ALP SERPL-CCNC: 75 U/L (ref 55–135)
ALT SERPL W/O P-5'-P-CCNC: 21 U/L (ref 10–44)
ANION GAP SERPL CALC-SCNC: 6 MMOL/L (ref 8–16)
AST SERPL-CCNC: 21 U/L (ref 10–40)
BILIRUB SERPL-MCNC: 0.9 MG/DL (ref 0.1–1)
BILIRUB UR QL STRIP: NEGATIVE
BUN SERPL-MCNC: 14 MG/DL (ref 8–23)
CALCIUM SERPL-MCNC: 9.4 MG/DL (ref 8.7–10.5)
CHLORIDE SERPL-SCNC: 107 MMOL/L (ref 95–110)
CHOLEST SERPL-MCNC: 137 MG/DL (ref 120–199)
CHOLEST/HDLC SERPL: 2.9 {RATIO} (ref 2–5)
CLARITY UR REFRACT.AUTO: CLEAR
CO2 SERPL-SCNC: 27 MMOL/L (ref 23–29)
COLOR UR AUTO: YELLOW
COMPLEXED PSA SERPL-MCNC: 0.22 NG/ML (ref 0–4)
CREAT SERPL-MCNC: 0.9 MG/DL (ref 0.5–1.4)
ERYTHROCYTE [DISTWIDTH] IN BLOOD BY AUTOMATED COUNT: 12.4 % (ref 11.5–14.5)
EST. GFR  (NO RACE VARIABLE): >60 ML/MIN/1.73 M^2
ESTIMATED AVG GLUCOSE: 105 MG/DL (ref 68–131)
GLUCOSE SERPL-MCNC: 102 MG/DL (ref 70–110)
GLUCOSE UR QL STRIP: NEGATIVE
HBA1C MFR BLD: 5.3 % (ref 4–5.6)
HCT VFR BLD AUTO: 45.4 % (ref 40–54)
HDLC SERPL-MCNC: 47 MG/DL (ref 40–75)
HDLC SERPL: 34.3 % (ref 20–50)
HGB BLD-MCNC: 15.4 G/DL (ref 14–18)
HGB UR QL STRIP: NEGATIVE
KETONES UR QL STRIP: NEGATIVE
LDLC SERPL CALC-MCNC: 74.6 MG/DL (ref 63–159)
LEUKOCYTE ESTERASE UR QL STRIP: NEGATIVE
MCH RBC QN AUTO: 31.3 PG (ref 27–31)
MCHC RBC AUTO-ENTMCNC: 33.9 G/DL (ref 32–36)
MCV RBC AUTO: 92 FL (ref 82–98)
NITRITE UR QL STRIP: NEGATIVE
NONHDLC SERPL-MCNC: 90 MG/DL
OHS QRS DURATION: 74 MS
OHS QTC CALCULATION: 406 MS
PH UR STRIP: 6 [PH] (ref 5–8)
PLATELET # BLD AUTO: 268 K/UL (ref 150–450)
PMV BLD AUTO: 9.9 FL (ref 9.2–12.9)
POTASSIUM SERPL-SCNC: 4.4 MMOL/L (ref 3.5–5.1)
PROT SERPL-MCNC: 7.2 G/DL (ref 6–8.4)
PROT UR QL STRIP: NEGATIVE
RBC # BLD AUTO: 4.92 M/UL (ref 4.6–6.2)
SODIUM SERPL-SCNC: 140 MMOL/L (ref 136–145)
SP GR UR STRIP: 1.02 (ref 1–1.03)
TRIGL SERPL-MCNC: 77 MG/DL (ref 30–150)
TSH SERPL DL<=0.005 MIU/L-ACNC: 1.83 UIU/ML (ref 0.4–4)
URN SPEC COLLECT METH UR: NORMAL
WBC # BLD AUTO: 7.92 K/UL (ref 3.9–12.7)

## 2024-07-08 PROCEDURE — 3008F BODY MASS INDEX DOCD: CPT | Mod: CPTII,S$GLB,, | Performed by: INTERNAL MEDICINE

## 2024-07-08 PROCEDURE — 3044F HG A1C LEVEL LT 7.0%: CPT | Mod: CPTII,S$GLB,, | Performed by: INTERNAL MEDICINE

## 2024-07-08 PROCEDURE — 99999 PR PBB SHADOW E&M-EST. PATIENT-LVL IV: CPT | Mod: PBBFAC,,, | Performed by: INTERNAL MEDICINE

## 2024-07-08 PROCEDURE — 83036 HEMOGLOBIN GLYCOSYLATED A1C: CPT | Performed by: INTERNAL MEDICINE

## 2024-07-08 PROCEDURE — 93005 ELECTROCARDIOGRAM TRACING: CPT | Mod: S$GLB,,, | Performed by: INTERNAL MEDICINE

## 2024-07-08 PROCEDURE — 80053 COMPREHEN METABOLIC PANEL: CPT | Performed by: INTERNAL MEDICINE

## 2024-07-08 PROCEDURE — 81003 URINALYSIS AUTO W/O SCOPE: CPT | Performed by: INTERNAL MEDICINE

## 2024-07-08 PROCEDURE — 3078F DIAST BP <80 MM HG: CPT | Mod: CPTII,S$GLB,, | Performed by: INTERNAL MEDICINE

## 2024-07-08 PROCEDURE — 99199 UNLISTED SPECIAL SVC PX/RPRT: CPT | Mod: S$GLB,,, | Performed by: INTERNAL MEDICINE

## 2024-07-08 PROCEDURE — 85027 COMPLETE CBC AUTOMATED: CPT | Performed by: INTERNAL MEDICINE

## 2024-07-08 PROCEDURE — 99999 PR PBB SHADOW E&M-EST. PATIENT-LVL I: CPT | Mod: PBBFAC,,,

## 2024-07-08 PROCEDURE — 3074F SYST BP LT 130 MM HG: CPT | Mod: CPTII,S$GLB,, | Performed by: INTERNAL MEDICINE

## 2024-07-08 PROCEDURE — 1159F MED LIST DOCD IN RCRD: CPT | Mod: CPTII,S$GLB,, | Performed by: INTERNAL MEDICINE

## 2024-07-08 PROCEDURE — 93010 ELECTROCARDIOGRAM REPORT: CPT | Mod: S$GLB,,, | Performed by: INTERNAL MEDICINE

## 2024-07-08 PROCEDURE — 80061 LIPID PANEL: CPT | Performed by: INTERNAL MEDICINE

## 2024-07-08 PROCEDURE — 99396 PREV VISIT EST AGE 40-64: CPT | Mod: S$GLB,,, | Performed by: INTERNAL MEDICINE

## 2024-07-08 PROCEDURE — 84443 ASSAY THYROID STIM HORMONE: CPT | Performed by: INTERNAL MEDICINE

## 2024-07-08 PROCEDURE — 84153 ASSAY OF PSA TOTAL: CPT | Performed by: INTERNAL MEDICINE

## 2024-07-08 RX ORDER — ALBUTEROL SULFATE 90 UG/1
AEROSOL, METERED RESPIRATORY (INHALATION)
COMMUNITY
Start: 2024-01-30

## 2024-07-08 NOTE — PROGRESS NOTES
Patient ID: Waldo English is a 64 y.o. male.    Chief Complaint: Executive Health      Assessment:       1. Annual physical exam    2. Interstitial lung disease: see evaluation Pulmonary 2023, April 2024    3. Mixed hyperlipidemia    4. Non-rheumatic mitral regurgitation: Mitral regurgitation: trivial see ECHO 4/16; stable 8/23    5. IFG (impaired fasting glucose)    6. ROSARIO (obstructive sleep apnea):  see HST 4/18 (mild)    7. Former smoker 1/2 pack daily for 10 years quit 2000    8. GERD without esophagitis, follows with [facility] 2017.  Repeated 12/22 benign polyp    9. Coronary artery disease: CT score 27 2023    10. Family history of colon cancer: father age 65    11. Benign non-nodular prostatic hyperplasia with lower urinary tract symptoms    12. Thumb pain, unspecified laterality          Plan:         1. Annual physical exam  -     Urinalysis, Reflex to Urine Culture Urine, Clean Catch    2. Interstitial lung disease: see evaluation Pulmonary 2023, April 2024    3. Mixed hyperlipidemia    4. Non-rheumatic mitral regurgitation: Mitral regurgitation: trivial see ECHO 4/16; stable 8/23  Overview:  Mitral regurgitation: trivial see ECHO 4/16      5. IFG (impaired fasting glucose)    6. ROSARIO (obstructive sleep apnea):  see HST 4/18 (mild)    7. Former smoker 1/2 pack daily for 10 years quit 2000    8. GERD without esophagitis, follows with [facility] 2017.  Repeated 12/22 benign polyp    9. Coronary artery disease: CT score 27 2023    10. Family history of colon cancer: father age 65    11. Benign non-nodular prostatic hyperplasia with lower urinary tract symptoms    12. Thumb pain, unspecified laterality  -     Ambulatory referral/consult to Orthopedics; Future; Expected date: 07/15/2024       Continue current regimen  Ortho follow-up  Keep GI follow-up  Urinalysis and review  RSV vaccine discussed, he might consider at a later point  He declines COVID booster    Subjective:   EH PE    Stress is  improved.    Working on lifestyle.    Coronary CT 27 last year.      Pulmonary stress test and PFTS- ILD dx last year, studies repeated April 2024 stable.  Will be seen again in 6 months.    Follows with outside gastro, GERD stable.    Labs all stable.    Not using CPAP and feels his sleep is good for the most part.    Patient Active Problem List   Diagnosis    Mixed hyperlipidemia    Chronic rhinitis    Benign non-nodular prostatic hyperplasia with lower urinary tract symptoms    Former smoker 1/2 pack daily for 10 years quit 2000    IFG (impaired fasting glucose)    Family history of colon cancer: father age 65    GERD without esophagitis, follows with Dr Jensen, EGD 2017.  Repeated 12/22 benign polyp    Non-rheumatic mitral regurgitation: Mitral regurgitation: trivial see ECHO 4/16; stable 8/23    Diverticulosis of large intestine without hemorrhage: see colonoscopy 9/17    ROSARIO (obstructive sleep apnea):  see HST 4/18 (mild)    Primary osteoarthritis of first carpometacarpal joint of left hand    Interstitial lung disease: see evaluation Pulmonary 2023, April 2024    Coronary artery disease: CT score 27 2023            Review of Systems   Constitutional: Negative.    HENT:  Negative for sinus pressure.    Eyes:  Negative for visual disturbance.   Respiratory:  Negative for apnea and shortness of breath.    Cardiovascular:  Negative for chest pain.   Gastrointestinal:  Negative for abdominal pain, constipation and diarrhea.        Stable GERD uses meds 3 x weekly or so   Genitourinary:  Negative for difficulty urinating, flank pain, frequency, testicular pain and urgency.   Musculoskeletal:  Positive for arthralgias. Negative for back pain.        Minimal arthralgias, occasional some discomfort, no trauma or injury   Skin:  Negative for color change and rash.   Neurological: Negative.    Psychiatric/Behavioral: Negative.           Objective:      Physical Exam  Constitutional:       Appearance: He is  well-developed.   HENT:      Head: Normocephalic and atraumatic.      Right Ear: External ear normal.      Left Ear: External ear normal.   Eyes:      Extraocular Movements: Extraocular movements intact.      Conjunctiva/sclera: Conjunctivae normal.   Neck:      Thyroid: No thyromegaly.   Cardiovascular:      Rate and Rhythm: Normal rate and regular rhythm.      Heart sounds: No murmur heard.  Pulmonary:      Effort: Pulmonary effort is normal. No respiratory distress.      Breath sounds: Normal breath sounds. No wheezing.   Abdominal:      General: There is no distension.      Palpations: Abdomen is soft.      Tenderness: There is no abdominal tenderness.   Musculoskeletal:         General: No tenderness.      Cervical back: Normal range of motion and neck supple.      Right lower leg: No edema.      Left lower leg: No edema.      Comments: No deformity, negative squeeze test   Lymphadenopathy:      Cervical: No cervical adenopathy.   Skin:     General: Skin is warm and dry.   Neurological:      General: No focal deficit present.      Mental Status: He is alert and oriented to person, place, and time.      Cranial Nerves: No cranial nerve deficit.   Psychiatric:         Mood and Affect: Mood normal.         Behavior: Behavior normal.         Thought Content: Thought content normal.         Judgment: Judgment normal.             Health Maintenance Due   Topic Date Due    RSV Vaccine (Age 60+ and Pregnant patients) (1 - 1-dose 60+ series) Never done

## 2024-07-08 NOTE — LETTER
July 9, 2024    Waldo English  [address] [place] [address]      Dear Waldo English,    On 7/8/2024, it was a pleasure to see you and perform your Executive Health evaluation. Your Primary Care physician is Elissa Maldonado MD. At the time of this visit, you are 64 y.o..  You denied any specific complaints or concerns during todays visit.        YOUR PAST MEDICAL HISTORY:  has a past medical history of BPH (benign prostatic hypertrophy), Chronic rhinitis, Diverticulosis of large intestine without hemorrhage: see colonoscopy 9/17, Family history of colon cancer: father age 65, Former smoker 1/2 pack daily for 10 years quit 2000, GERD (gastroesophageal reflux disease), Hyperlipidemia, Mitral regurgitation, and Rhinitis..    YOUR PAST SURGICAL HISTORY:  has a past surgical history that includes cataract surgery and Excision of ganglion cyst of hand (Right, 1/19/2022)..    YOUR CURRENT MEDICATIONS:   Current Outpatient Medications:     albuterol (PROVENTIL/VENTOLIN HFA) 90 mcg/actuation inhaler, Inhale into the lungs., Disp: , Rfl:     aspirin 81 MG Chew, Take 1 tablet (81 mg total) by mouth once daily., Disp: , Rfl:     atorvastatin (LIPITOR) 20 MG tablet, Take 1 tablet (20 mg total) by mouth once daily., Disp: 90 tablet, Rfl: 1    famotidine (PEPCID) 20 MG tablet, Take 20 mg by mouth every other day., Disp: , Rfl:     finasteride (PROSCAR) 5 mg tablet, Take 1 tablet (5 mg total) by mouth once daily., Disp: 90 tablet, Rfl: 3    fluticasone propionate (FLONASE) 50 mcg/actuation nasal spray, INHALE 2 SPRAYS INTO EACH NOSTRIL TWICE DAILY, Disp: 48 g, Rfl: 3    glucosamine/msm/csa/samantha/wsy094 (GLUCOSAMIN-CHOND-MSM-SAMANTHA-115HC ORAL), Take by mouth., Disp: , Rfl:     hydrocortisone 2.5 % cream, , Disp: , Rfl:     RABEprazole (ACIPHEX) 20 mg tablet, TAKE 1 TABLET EVERY OTHER DAY, Disp: 45 tablet, Rfl: 3.    YOUR KNOWN DRUG ALLERGIES:  has No Known Allergies.    YOUR SOCIAL HISTORY:  reports that he quit smoking about 24  "years ago. His smoking use included cigarettes. He started smoking about 44 years ago. He has a 10 pack-year smoking history. He has never been exposed to tobacco smoke. He has never used smokeless tobacco. He reports current alcohol use. He reports that he does not use drugs..    YOUR FAMILY HISTORY: family history includes Cancer (age of onset: 65) in his father; Diabetes in his sister; Heart disease in his father and mother; Hyperlipidemia in his mother; Hypertension in his mother; Multiple sclerosis in his sister; No Known Problems in his brother, daughter, and sister; Pulmonary fibrosis in his mother..     YOUR ROUTINE HEALTH MAINTENANCE includes   Health Maintenance   Topic Date Due    PROSTATE-SPECIFIC ANTIGEN  07/08/2025    Lipid Panel  07/08/2025    Colorectal Cancer Screening  12/14/2027    TETANUS VACCINE  05/17/2033    Hepatitis C Screening  Completed    Shingles Vaccine  Completed   .    PHYSICAL EXAMINATION:  Vitals:    07/08/24 0911   BP: 110/60   BP Location: Right arm   Patient Position: Sitting   BP Method: Medium (Manual)   Weight: 79.8 kg (176 lb)   Height: 5' 9" (1.753 m)       These are normal vital signs. Your physical examination did not reveal any additional significant abnormal findings.    YOUR BLOOD WORK AND ADDITIONAL TESTS:  Labs acceptable.    EKG and urinalysis were likewise acceptable.    Because of the thumb symptoms have been persistent, I am recommending an evaluation with the orthopedic hand specialist.    Please continue on your same regimen.  Please continue with exercise and healthy eating as you are doing.  Continuing with your Gastro follow up with the appropriate interval would be recommended.    In summary, you are overall in excellent health.     I would recommend the RSV vaccine at your convenience.    It was a pleasure to see you and perform this Executive Health evaluation. If I can be of any further assistance, or if you have any additional questions or concerns, " please do not hesitate to let me know.    Sincerely,      Elissa Maldonado MD

## 2024-10-07 NOTE — PROGRESS NOTES
Subjective:       Patient ID: Waldo English is a 64 y.o. male.    Chief Complaint: No chief complaint on file.    HPI:   Waldo English is a 64 y.o. male last seen by me 4/30/24 who presents in f/u for ILD.    Today:  Doing well since his last visit. Focused more on his GERD (sleeping, diet) and noted his cough has improved. Continues to have mild MCCANN at 4-5 flights of stairs. Denies chest pain, wheezing with activity.     Not exercising as much as he used to. Planning on restarting swimming.    Denies any URI/LRTIs.    Denies new s/s of CTD. Denies new environmental exposures.    4/30/24 HPI:  Respiratory symptoms are relatively stable. Does report a mild, dry cough that occurs w/ deep inspiration/laughing. Using IS in the am which will trigger it as well. Denies cough with activity.     Was not swimming over the winter but is going to restart soon. Stays very active and denies dyspnea or coughing with activity.     No new ROS or exposures.    8/16/23 HPI:  ILD incidentally noted on CT Cardiac. Relatively asymptomatic from a respiratory standpoint. Does exercise including swimming with good sats and HR. Denies recurrent URI/LRTIs. Denies nighttime symptoms. Does describe a subtle, dry cough that he notices after a deep    Measures his oxygenation regularly and states it is >97% even with activity.    Denies chest pain, orthopnea, PND, LE edema, palpitations, syncope/presyncope    + GERD sx somewhat controlled. Has undergone an extensive w/u in the past.    Denies f/c/ns, weight loss, malaise, fatigue    Denies rashes, myalgias, arthralgias, hair/nail changes, dysphagia, eye changes, raynauds, mucosal ulcerations    Exposures:  Work- Indoors  Tobacco- 1/2 ppd for 20 years, quit in 2000  Inhalational Agents- Denies  Mold- Denies  Pets- Dog  Birds- Denies  Hot tubs- Denies  Medications- N/a  Childhood history of Lung Disease: Denies    Mother reportedly had IPF in her 80s but was on amiodarone at the time and had  GGOs.    Review of Systems    As above    Social History     Tobacco Use    Smoking status: Former     Current packs/day: 0.00     Average packs/day: 0.5 packs/day for 20.0 years (10.0 ttl pk-yrs)     Types: Cigarettes     Start date: 1980     Quit date: 2000     Years since quittin.4     Passive exposure: Never    Smokeless tobacco: Never   Substance Use Topics    Alcohol use: Yes     Comment: Rarely, maybe 1-2 drinks a week if that       Review of patient's allergies indicates:  No Known Allergies  Past Medical History:   Diagnosis Date    BPH (benign prostatic hypertrophy) 2015    Chronic rhinitis 2015    Diverticulosis of large intestine without hemorrhage: see colonoscopy 9/17 3/26/2018    Family history of colon cancer: father age 65 2015    Former smoker 1/2 pack daily for 10 years quit 2015    GERD (gastroesophageal reflux disease) 2015    Hyperlipidemia 2015    Mitral regurgitation 2016    Rhinitis 2015     Past Surgical History:   Procedure Laterality Date    cataract surgery      April then 2018 bilateral    EXCISION OF GANGLION CYST OF HAND Right 2022    Procedure: EXCISION, GANGLION CYST, HAND;  Surgeon: Lennox Verdin Jr., MD;  Location: [facility] OR;  Service: Orthopedics;  Laterality: Right;  thumb     Current Outpatient Medications on File Prior to Visit   Medication Sig    albuterol (PROVENTIL/VENTOLIN HFA) 90 mcg/actuation inhaler Inhale into the lungs.    aspirin 81 MG Chew Take 1 tablet (81 mg total) by mouth once daily.    atorvastatin (LIPITOR) 20 MG tablet Take 1 tablet (20 mg total) by mouth once daily.    famotidine (PEPCID) 20 MG tablet Take 20 mg by mouth every other day.    finasteride (PROSCAR) 5 mg tablet Take 1 tablet (5 mg total) by mouth once daily.    fluticasone propionate (FLONASE) 50 mcg/actuation nasal spray INHALE 2 SPRAYS INTO EACH NOSTRIL TWICE DAILY    glucosamine/msm/csa/samantha/bbo987  "(GLUCOSAMIN-CHOND-[facility]-115HC ORAL) Take by mouth.    hydrocortisone 2.5 % cream     RABEprazole (ACIPHEX) 20 mg tablet TAKE 1 TABLET EVERY OTHER DAY     No current facility-administered medications on file prior to visit.       Objective:      Vitals:    10/08/24 0824   BP: (!) 147/79   BP Location: Right arm   Patient Position: Sitting   Pulse: 86   SpO2: 99%   Weight: 79.8 kg (176 lb)   Height: 5' 9" (1.753 m)         Physical Exam   Constitutional: He appears well-developed and well-nourished.   HENT:   Nose: No mucosal edema.   Mouth/Throat: Oropharynx is clear and moist. Mallampati Score: I.   Neck: No tracheal deviation present.   Cardiovascular: Normal rate, regular rhythm and intact distal pulses.   Pulmonary/Chest: Normal expansion, symmetric chest wall expansion, effort normal and breath sounds normal. No respiratory distress. He has no wheezes. He has no rhonchi. He has no rales.   Abdominal: He exhibits no distension.   Musculoskeletal:         General: No edema.      Cervical back: Neck supple.   Lymphadenopathy: No supraclavicular adenopathy is present.     He has no cervical adenopathy.   Skin: Skin is warm and dry. No cyanosis or erythema. Nails show no clubbing.   Nursing note and vitals reviewed.      Personal Diagnostic Review    Laboratory:  7/8/24  Hgb- 15.4  Bicarb- 27    5/17/23  Bicarb- 27        CT Chest 7/27/23- Images personally reviewed. I agree w/ the radiologist who notes;  Subpleural reticulations with traction bronchiectasis most pronounced within the lower lungs without evidence for honeycombing.  Findings are most consistent with subpleural fibrotic interstitial lung abnormality and correlation with patient's clinical findings is recommended.     Subcentimeter hepatic hypodensities, likely small cysts but too small to adequately characterize.    CXR 6/1/22- Images personally reviewed. I agree w/ the radiologist who notes;  No acute process    PFTs   10/8/24  FVC: 4.97 (115.4 % " predicted), FEV1: 3.67 (111.0 % predicted), FEV1/FVC:  74, T.23 (104.5 % predicted), DLCO: 20.54 (74.6 % predicted)    24  FVC: 5.00 (115.9 % predicted), FEV1: 3.76 (113.3 % predicted), FEV1/FVC:  75, T.03 (101.6 % predicted), DLCO: 20.20 (73.4 % predicted)    23  FVC: 5.16 (118.7 % predicted), FEV1: 4.06 (121.4 % predicted), FEV1/FVC:  79, T.56 (109.2 % predicted), DLCO: 21.21 (76.5 % predicted)    6 MWT  10/08/2024---------Distance: 594.36 meters (1950 feet)       O2 Sat % Supplemental Oxygen Heart Rate Blood Pressure Maegan Scale   Pre-exercise  (Resting) 98 % Room Air 68 bpm 129/75 mmHg 0   During Exercise 98 % Room Air 110 bpm 147/79 mmHg 4   Post-exercise  (Recovery) 99 % Room Air  86 bpm          Recovery Time: 60 seconds    2024---------Distance: 563.88 meters (1850 feet)       O2 Sat % Supplemental Oxygen Heart Rate Blood Pressure Maegan Scale   Pre-exercise  (Resting) 100 % Room Air 70 bpm 118/79 mmHg 0   During Exercise 98 % Room Air 105 bpm 156/89 mmHg 1   Post-exercise  (Recovery) 98 % Room Air  83 bpm 133/79 mmHg        Recovery Time: 146 seconds       2023---------Distance: 548.64 meters (1800 feet)       O2 Sat % Supplemental Oxygen Heart Rate Blood Pressure Maegan Scale   Pre-exercise  (Resting) 97 % Room Air 83 bpm 134/62 mmHg 0   During Exercise 98 % Room Air 115 bpm 162/77 mmHg 1   Post-exercise  (Recovery) 98 % Room Air  88 bpm 141/68 mmHg        Recovery Time: 118 seconds    TTE 23    Left Ventricle: The left ventricle is normal in size. Normal wall thickness. Normal wall motion. There is normal systolic function with a visually estimated ejection fraction of 60 - 65%. There is normal diastolic function.    Right Ventricle: Normal right ventricular cavity size. Wall thickness is normal. Right ventricle wall motion  is normal. Systolic function is normal.    Mitral Valve: There is mild regurgitation.    IVC/SVC: Normal venous pressure at 3 mmHg.    Stress TTE  9/20/21  The patient exercised for 7 minutes and 4 seconds on a high ramp protocol, corresponding to a functional capacity of 12 METS, achieving a peak heart rate of 160 bpm, which is 101% of the age predicted maximum heart rate.  There were no arrhythmias during stress.  The patient's exercise capacity was normal.  The test was stopped because the patient experienced fatigue.  The ECG portion of this study is positive for myocardial ischemia.  The left ventricle is normal in size with normal systolic function.  The estimated ejection fraction is 60%.  Normal right ventricular size with normal right ventricular systolic function.  Normal left ventricular diastolic function.  The estimated PA systolic pressure is 31 mmHg.  Normal central venous pressure (3 mmHg).  The stress echo portion of this study is negative for myocardial ischemia.          Assessment:     Problem List Items Addressed This Visit          Pulmonary    Interstitial lung disease: see evaluation Pulmonary 2023, April 2024 - Primary     Incidentally found on CT Cardiac. Confirmed on CT Chest 7/27/23. Basilar predominant R>L subpleural reticulations with several areas of bronchiolectasis in the RLL but also with some areas of peripheral sparing. ROS is unremarkable for CTD. PFTs are normal and stable. Respiratory sx include a mild dry cough that seems more attributable to GERD as well as MCCANN at 4-5 flights of stairs.     Not exercising as regularly as in the past    - Stable PFTs over the last year w/ stable respiratory sx  - Check PFTs and 6MWT yearly or if change in sx  - Check a CT Chest and serologic eval if PFTs or dyspnea worsens  - Encouraged excellent exercise regimen  - Cont conservative approach to GERD  - 1 yr f/u w/ PFTs and 6MWT  - Discussed flu, covid, rsv vaccine. He is getting flu later today and will plan on RSV and covid later this month               Other    Former smoker 1/2 pack daily for 10 years quit 2000     Encouraged  continued abstinence              30 minutes of total time spent on the encounter, which includes face to face time and non-face to face time preparing to see the patient (eg, review of tests), Obtaining and/or reviewing separately obtained history, Documenting clinical information in the electronic or other health record, Independently interpreting results (not separately reported) and communicating results to the patient/family/caregiver, or Care coordination (not separately reported).

## 2024-10-08 ENCOUNTER — HOSPITAL ENCOUNTER (OUTPATIENT)
Dept: PULMONOLOGY | Facility: CLINIC | Age: 64
Discharge: HOME | End: 2024-10-08
Payer: COMMERCIAL

## 2024-10-08 ENCOUNTER — OFFICE VISIT (OUTPATIENT)
Dept: PULMONOLOGY | Facility: CLINIC | Age: 64
End: 2024-10-08
Payer: COMMERCIAL

## 2024-10-08 VITALS
SYSTOLIC BLOOD PRESSURE: 147 MMHG | DIASTOLIC BLOOD PRESSURE: 79 MMHG | HEIGHT: 69 IN | HEART RATE: 86 BPM | BODY MASS INDEX: 26.07 KG/M2 | WEIGHT: 176 LBS | BODY MASS INDEX: 26.07 KG/M2 | WEIGHT: 176 LBS | OXYGEN SATURATION: 99 % | HEIGHT: 69 IN

## 2024-10-08 DIAGNOSIS — J84.9 INTERSTITIAL LUNG DISEASE: ICD-10-CM

## 2024-10-08 DIAGNOSIS — J84.9 INTERSTITIAL LUNG DISEASE: Primary | ICD-10-CM

## 2024-10-08 DIAGNOSIS — Z87.891 FORMER SMOKER: ICD-10-CM

## 2024-10-08 LAB
DLCO SINGLE BREATH LLN: 20.6
DLCO SINGLE BREATH PRE REF: 74.6 %
DLCO SINGLE BREATH REF: 27.52
DLCOC SBVA LLN: 2.77
DLCOC SBVA REF: 3.98
DLCOC SINGLE BREATH LLN: 20.6
DLCOC SINGLE BREATH REF: 27.52
DLCOCSBVAULN: 5.18
DLCOCSINGLEBREATHULN: 34.45
DLCOSINGLEBREATHULN: 34.45
DLCOSINGLEBREATHZSCORE: -1.66
DLCOVA LLN: 2.77
DLCOVA PRE REF: 78.8 %
DLCOVA PRE: 3.13 ML/(MIN*MMHG*L) (ref 2.77–5.18)
DLCOVA REF: 3.98
DLCOVAULN: 5.18
ERV LLN: -16448.89
ERV PRE REF: 117.2 %
ERV REF: 1.11
ERVULN: ABNORMAL
FEF 25 75 LLN: 1.64
FEF 25 75 PRE REF: 75.4 %
FEF 25 75 REF: 3.35
FEV05 LLN: 1.52
FEV05 REF: 2.65
FEV1 FVC LLN: 64
FEV1 FVC PRE REF: 95.9 %
FEV1 FVC REF: 77
FEV1 LLN: 2.44
FEV1 PRE REF: 111 %
FEV1 REF: 3.3
FEV1FVCZSCORE: -0.43
FEV1ZSCORE: 0.73
FRCPLETH LLN: 2.6
FRCPLETH PREREF: 99.6 %
FRCPLETH REF: 3.59
FRCPLETHULN: 4.57
FVC LLN: 3.24
FVC PRE REF: 115.4 %
FVC REF: 4.3
FVCZSCORE: 1.02
IVC PRE: 4.78 L (ref 3.24–5.38)
IVC SINGLE BREATH LLN: 3.24
IVC SINGLE BREATH PRE REF: 111.1 %
IVC SINGLE BREATH REF: 4.3
IVCSINGLEBREATHULN: 5.38
LLN IC: -9999996.86
PEF LLN: 6.43
PEF PRE REF: 139.6 %
PEF REF: 8.68
PHYSICIAN COMMENT: ABNORMAL
PRE DLCO: 20.54 ML/(MIN*MMHG) (ref 20.6–34.45)
PRE ERV: 1.3 L (ref -16448.89–16451.11)
PRE FEF 25 75: 2.52 L/S (ref 1.64–5.06)
PRE FET 100: 10.61 SEC
PRE FEV05 REF: 115.8 %
PRE FEV1 FVC: 73.8 % (ref 64.25–88.04)
PRE FEV1: 3.67 L (ref 2.44–4.11)
PRE FEV5: 3.07 L (ref 1.52–3.79)
PRE FRC PL: 3.57 L (ref 2.6–4.57)
PRE FVC: 4.97 L (ref 3.24–5.38)
PRE IC: 3.66 L (ref -9999996.86–#######.####)
PRE PEF: 12.13 L/S (ref 6.43–10.94)
PRE REF IC: 116.8 %
PRE RV: 2.27 L (ref 1.8–3.15)
PRE TLC: 7.23 L (ref 5.77–8.07)
RAW PRE REF: 63.4 %
RAW PRE: 1.94 CMH2O*S/L (ref 3.06–3.06)
RAW REF: 3.06
REF IC: 3.14
RV LLN: 1.8
RV PRE REF: 91.6 %
RV REF: 2.47
RVTLC LLN: 30
RVTLC PRE REF: 80.5 %
RVTLC PRE: 31.33 % (ref 29.94–47.9)
RVTLC REF: 39
RVTLCULN: 48
RVULN: 3.15
SGAW PRE REF: 142.8 %
SGAW PRE: 0.12 1/(CMH2O*S) (ref 0.08–0.08)
SGAW REF: 0.08
TLC LLN: 5.77
TLC PRE REF: 104.5 %
TLC REF: 6.92
TLC ULN: 8.07
TLCZSCORE: 0.44
ULN IC: ABNORMAL
VA PRE: 6.55 L (ref 6.77–6.77)
VA SINGLE BREATH LLN: 6.77
VA SINGLE BREATH PRE REF: 96.7 %
VA SINGLE BREATH REF: 6.77
VASINGLEBREATHULN: 6.77
VC LLN: 3.24
VC PRE REF: 115.4 %
VC PRE: 4.97 L (ref 3.24–5.38)
VC REF: 4.3
VC ULN: 5.38

## 2024-10-08 PROCEDURE — 3077F SYST BP >= 140 MM HG: CPT | Mod: CPTII,S$GLB,, | Performed by: INTERNAL MEDICINE

## 2024-10-08 PROCEDURE — 94618 PULMONARY STRESS TESTING: CPT | Mod: S$GLB,,, | Performed by: INTERNAL MEDICINE

## 2024-10-08 PROCEDURE — 3078F DIAST BP <80 MM HG: CPT | Mod: CPTII,S$GLB,, | Performed by: INTERNAL MEDICINE

## 2024-10-08 PROCEDURE — 1159F MED LIST DOCD IN RCRD: CPT | Mod: CPTII,S$GLB,, | Performed by: INTERNAL MEDICINE

## 2024-10-08 PROCEDURE — 3008F BODY MASS INDEX DOCD: CPT | Mod: CPTII,S$GLB,, | Performed by: INTERNAL MEDICINE

## 2024-10-08 PROCEDURE — 3044F HG A1C LEVEL LT 7.0%: CPT | Mod: CPTII,S$GLB,, | Performed by: INTERNAL MEDICINE

## 2024-10-08 PROCEDURE — 99214 OFFICE O/P EST MOD 30 MIN: CPT | Mod: 25,S$GLB,, | Performed by: INTERNAL MEDICINE

## 2024-10-08 PROCEDURE — 99999 PR PBB SHADOW E&M-EST. PATIENT-LVL III: CPT | Mod: PBBFAC,,, | Performed by: INTERNAL MEDICINE

## 2024-10-08 NOTE — ASSESSMENT & PLAN NOTE
Incidentally found on CT Cardiac. Confirmed on CT Chest 7/27/23. Basilar predominant R>L subpleural reticulations with several areas of bronchiolectasis in the RLL but also with some areas of peripheral sparing. ROS is unremarkable for CTD. PFTs are normal and stable. Respiratory sx include a mild dry cough that seems more attributable to GERD as well as MCCANN at 4-5 flights of stairs.     Not exercising as regularly as in the past    - Stable PFTs over the last year w/ stable respiratory sx  - Check PFTs and 6MWT yearly or if change in sx  - Check a CT Chest and serologic eval if PFTs or dyspnea worsens  - Encouraged excellent exercise regimen  - Cont conservative approach to GERD  - 1 yr f/u w/ PFTs and 6MWT  - Discussed flu, covid, rsv vaccine. He is getting flu later today and will plan on RSV and covid later this month

## 2024-10-08 NOTE — PROCEDURES
Waldo English is a 64 y.o.   male patient, who presents for a 6 minute walk test ordered by MD Karina.  The diagnosis is Shortness of Breath; Interstitial Lung Disease.  The patient's BMI is 26 kg/m2.  Predicted distance (lower limit of normal) is 396.98 meters.      Test Results:    The test was completed without stopping.  The total time walked was 360 seconds.  During walking, the patient reported:  No complaints.  The patient used no assistive devices during testing.     10/08/2024---------Distance: 594.36 meters (1950 feet)     O2 Sat % Supplemental Oxygen Heart Rate Blood Pressure Maegan Scale   Pre-exercise  (Resting) 98 % Room Air 68 bpm 129/75 mmHg 0   During Exercise 98 % Room Air 110 bpm 147/79 mmHg 4   Post-exercise  (Recovery) 99 % Room Air  86 bpm       Recovery Time: 60 seconds    Performing nurse/tech: Tonja SCHWARTZ      PREVIOUS STUDY:   04/30/2024---------Distance: 563.88 meters (1850 feet)       O2 Sat % Supplemental Oxygen Heart Rate Blood Pressure Maegan Scale   Pre-exercise  (Resting) 100 % Room Air 70 bpm 118/79 mmHg 0   During Exercise 98 % Room Air 105 bpm 156/89 mmHg 1   Post-exercise  (Recovery) 98 % Room Air  83 bpm 133/79 mmHg         CLINICAL INTERPRETATION:  Six minute walk distance is 594.36 meters (1950 feet) with somewhat heavy dyspnea.  During exercise, there was no desaturation while breathing room air.  Blood pressure remained stable and Heart rate increased significantly with walking.  The patient did not report non-pulmonary symptoms during exercise.  Since the previous study in April 2024, exercise capacity is unchanged.  Based upon age and body mass index, exercise capacity is normal.

## 2024-11-14 ENCOUNTER — TELEPHONE (OUTPATIENT)
Dept: PHARMACY | Facility: CLINIC | Age: 64
End: 2024-11-14
Payer: COMMERCIAL

## 2024-11-14 NOTE — TELEPHONE ENCOUNTER
[facility]/Population Health Chart Review & Patient Outreach Details For Medication Adherence Project    Reason for Outreach Encounter: 3rd Party payor non-compliance report ([employer], [employer], C, etc)  2.  Patient Outreach Method: Reviewed patient chart  and [employer] message  3.   Medication in question:   Hyperlipidemia Medications               atorvastatin (LIPITOR) 20 MG tablet Take 1 tablet (20 mg total) by mouth once daily.                  ATORVASTATIN  last filled  7/27 for 90 day supply    4.  Reviewed and or Updates Made To: Patient Chart  5. Outreach Outcomes and/or actions taken: Sent inquiry to patient: Waiting for response  Additional Notes:

## 2024-12-20 ENCOUNTER — TELEPHONE (OUTPATIENT)
Dept: PHARMACY | Facility: CLINIC | Age: 64
End: 2024-12-20
Payer: COMMERCIAL

## 2024-12-20 NOTE — TELEPHONE ENCOUNTER
[facility]/Population Health Chart Review & Patient Outreach Details For Medication Adherence Project    Reason for Outreach Encounter: 3rd Party payor non-compliance report ([employer], [employer], C, etc)  2.  Patient Outreach Method: Reviewed patient chart  and [employer] message  3.   Medication in question:    Hyperlipidemia Medications               atorvastatin (LIPITOR) 20 MG tablet Take 1 tablet (20 mg total) by mouth once daily.                  atorvastatin  last filled  7/27 for 90 day supply      4.  Reviewed and or Updates Made To: Patient Chart  5. Outreach Outcomes and/or actions taken: Sent inquiry to patient: Waiting for response  Additional Notes:

## 2024-12-21 NOTE — TELEPHONE ENCOUNTER
No care due was identified.  [facility] Embedded Care Due Messages. Reference number: [id number].   12/20/2024 8:06:25 PM CST

## 2024-12-23 RX ORDER — FINASTERIDE 5 MG/1
5 TABLET, FILM COATED ORAL DAILY
Qty: 90 TABLET | Refills: 1 | Status: SHIPPED | OUTPATIENT
Start: 2024-12-23

## 2024-12-23 RX ORDER — ATORVASTATIN CALCIUM 20 MG/1
20 TABLET, FILM COATED ORAL DAILY
Qty: 90 TABLET | Refills: 1 | Status: SHIPPED | OUTPATIENT
Start: 2024-12-23

## 2024-12-23 NOTE — TELEPHONE ENCOUNTER
Refill Routing Note   Medication(s) are not appropriate for processing by [facility] for the following reason(s):        Due for refill >6 months ago    [facility] action(s):  Approve  Defer             Appointments  past 12m or future 3m with PCP    Date Provider   Last Visit   7/8/2024 Elissa Maldonado MD   Next Visit   Visit date not found Elissa Maldonado MD   ED visits in past 90 days: 0        Note composed:7:59 AM 12/23/2024

## 2025-01-14 ENCOUNTER — TELEPHONE (OUTPATIENT)
Dept: PHARMACY | Facility: CLINIC | Age: 65
End: 2025-01-14
Payer: COMMERCIAL

## 2025-01-14 NOTE — TELEPHONE ENCOUNTER
[facility]/Population Health Chart Review & Patient Outreach Details For Medication Adherence Project    Reason for Outreach Encounter: 3rd Party payor non-compliance report ([employer], [employer], C, etc)  2.  Patient Outreach Method: Reviewed patient chart   3.   Medication in question:    Hyperlipidemia Medications               atorvastatin (LIPITOR) 20 MG tablet Take 1 tablet (20 mg total) by mouth once daily.                  atorvastatin  last filled  12/23/24 for 90 day supply      4.  Reviewed and or Updates Made To: Patient Chart  5. Outreach Outcomes and/or actions taken: Patient filled medication and is on track to be adherent  Additional Notes:

## 2025-03-12 DIAGNOSIS — Z00.00 ROUTINE GENERAL MEDICAL EXAMINATION AT A HEALTH CARE FACILITY: Primary | ICD-10-CM

## 2025-03-21 ENCOUNTER — RESULTS FOLLOW-UP (OUTPATIENT)
Dept: INTERNAL MEDICINE | Facility: CLINIC | Age: 65
End: 2025-03-21

## 2025-03-21 ENCOUNTER — OFFICE VISIT (OUTPATIENT)
Dept: INTERNAL MEDICINE | Facility: CLINIC | Age: 65
End: 2025-03-21
Payer: COMMERCIAL

## 2025-03-21 ENCOUNTER — CLINICAL SUPPORT (OUTPATIENT)
Dept: INTERNAL MEDICINE | Facility: CLINIC | Age: 65
End: 2025-03-21
Payer: COMMERCIAL

## 2025-03-21 ENCOUNTER — HOSPITAL ENCOUNTER (OUTPATIENT)
Dept: CARDIOLOGY | Facility: CLINIC | Age: 65
Discharge: HOME | End: 2025-03-21
Payer: COMMERCIAL

## 2025-03-21 VITALS
SYSTOLIC BLOOD PRESSURE: 130 MMHG | DIASTOLIC BLOOD PRESSURE: 70 MMHG | HEART RATE: 83 BPM | BODY MASS INDEX: 25.5 KG/M2 | HEIGHT: 70 IN | OXYGEN SATURATION: 97 % | WEIGHT: 178.13 LBS

## 2025-03-21 DIAGNOSIS — J84.9 INTERSTITIAL LUNG DISEASE: ICD-10-CM

## 2025-03-21 DIAGNOSIS — E78.2 MIXED HYPERLIPIDEMIA: ICD-10-CM

## 2025-03-21 DIAGNOSIS — Z00.00 ROUTINE GENERAL MEDICAL EXAMINATION AT A HEALTH CARE FACILITY: ICD-10-CM

## 2025-03-21 DIAGNOSIS — I25.10 CORONARY ARTERY DISEASE INVOLVING NATIVE CORONARY ARTERY OF NATIVE HEART WITHOUT ANGINA PECTORIS: ICD-10-CM

## 2025-03-21 DIAGNOSIS — Z87.891 FORMER SMOKER: ICD-10-CM

## 2025-03-21 DIAGNOSIS — Z13.6 ENCOUNTER FOR ABDOMINAL AORTIC ANEURYSM (AAA) SCREENING: ICD-10-CM

## 2025-03-21 DIAGNOSIS — E78.2 MIXED HYPERLIPIDEMIA: Primary | ICD-10-CM

## 2025-03-21 DIAGNOSIS — Z00.00 ANNUAL PHYSICAL EXAM: Primary | ICD-10-CM

## 2025-03-21 DIAGNOSIS — Z00.00 VISIT FOR ANNUAL HEALTH EXAMINATION: Primary | ICD-10-CM

## 2025-03-21 LAB
ALBUMIN SERPL BCP-MCNC: 3.8 G/DL (ref 3.5–5.2)
ALP SERPL-CCNC: 73 U/L (ref 40–150)
ALT SERPL W/O P-5'-P-CCNC: 20 U/L (ref 10–44)
ANION GAP SERPL CALC-SCNC: 8 MMOL/L (ref 8–16)
AST SERPL-CCNC: 20 U/L (ref 10–40)
BILIRUB SERPL-MCNC: 0.7 MG/DL (ref 0.1–1)
BUN SERPL-MCNC: 11 MG/DL (ref 8–23)
CALCIUM SERPL-MCNC: 9.3 MG/DL (ref 8.7–10.5)
CHLORIDE SERPL-SCNC: 106 MMOL/L (ref 95–110)
CHOLEST SERPL-MCNC: 142 MG/DL (ref 120–199)
CHOLEST/HDLC SERPL: 3.1 {RATIO} (ref 2–5)
CO2 SERPL-SCNC: 26 MMOL/L (ref 23–29)
COMPLEXED PSA SERPL-MCNC: 0.17 NG/ML (ref 0–4)
CREAT SERPL-MCNC: 0.8 MG/DL (ref 0.5–1.4)
ERYTHROCYTE [DISTWIDTH] IN BLOOD BY AUTOMATED COUNT: 12.6 % (ref 11.5–14.5)
EST. GFR  (NO RACE VARIABLE): >60 ML/MIN/1.73 M^2
ESTIMATED AVG GLUCOSE: 108 MG/DL (ref 68–131)
GLUCOSE SERPL-MCNC: 102 MG/DL (ref 70–110)
HBA1C MFR BLD: 5.4 % (ref 4–5.6)
HCT VFR BLD AUTO: 45.2 % (ref 40–54)
HDLC SERPL-MCNC: 46 MG/DL (ref 40–75)
HDLC SERPL: 32.4 % (ref 20–50)
HGB BLD-MCNC: 15.2 G/DL (ref 14–18)
LDLC SERPL CALC-MCNC: 86.2 MG/DL (ref 63–159)
MCH RBC QN AUTO: 30.7 PG (ref 27–31)
MCHC RBC AUTO-ENTMCNC: 33.6 G/DL (ref 32–36)
MCV RBC AUTO: 91 FL (ref 82–98)
NONHDLC SERPL-MCNC: 96 MG/DL
OHS QRS DURATION: 76 MS
OHS QTC CALCULATION: 408 MS
PLATELET # BLD AUTO: 248 K/UL (ref 150–450)
PMV BLD AUTO: 9.7 FL (ref 9.2–12.9)
POTASSIUM SERPL-SCNC: 4.4 MMOL/L (ref 3.5–5.1)
PROT SERPL-MCNC: 7.6 G/DL (ref 6–8.4)
RBC # BLD AUTO: 4.95 M/UL (ref 4.6–6.2)
SODIUM SERPL-SCNC: 140 MMOL/L (ref 136–145)
TRIGL SERPL-MCNC: 49 MG/DL (ref 30–150)
TSH SERPL DL<=0.005 MIU/L-ACNC: 2.1 UIU/ML (ref 0.4–4)
WBC # BLD AUTO: 6.24 K/UL (ref 3.9–12.7)

## 2025-03-21 PROCEDURE — 93010 ELECTROCARDIOGRAM REPORT: CPT | Mod: S$GLB,,, | Performed by: INTERNAL MEDICINE

## 2025-03-21 PROCEDURE — 80053 COMPREHEN METABOLIC PANEL: CPT | Performed by: INTERNAL MEDICINE

## 2025-03-21 PROCEDURE — 99999 PR PBB SHADOW E&M-EST. PATIENT-LVL IV: CPT | Mod: PBBFAC,,, | Performed by: INTERNAL MEDICINE

## 2025-03-21 PROCEDURE — 84443 ASSAY THYROID STIM HORMONE: CPT | Performed by: INTERNAL MEDICINE

## 2025-03-21 PROCEDURE — 85027 COMPLETE CBC AUTOMATED: CPT | Performed by: INTERNAL MEDICINE

## 2025-03-21 PROCEDURE — 99999 PR PBB SHADOW E&M-EST. PATIENT-LVL I: CPT | Mod: PBBFAC,,,

## 2025-03-21 PROCEDURE — 93005 ELECTROCARDIOGRAM TRACING: CPT | Mod: S$GLB,,, | Performed by: INTERNAL MEDICINE

## 2025-03-21 PROCEDURE — 83036 HEMOGLOBIN GLYCOSYLATED A1C: CPT | Performed by: INTERNAL MEDICINE

## 2025-03-21 PROCEDURE — 80061 LIPID PANEL: CPT | Performed by: INTERNAL MEDICINE

## 2025-03-21 PROCEDURE — 84153 ASSAY OF PSA TOTAL: CPT | Performed by: INTERNAL MEDICINE

## 2025-03-21 RX ORDER — ATORVASTATIN CALCIUM 20 MG/1
20 TABLET, FILM COATED ORAL NIGHTLY
Qty: 90 TABLET | Refills: 3 | Status: SHIPPED | OUTPATIENT
Start: 2025-03-21

## 2025-03-21 NOTE — PROGRESS NOTES
INTERNAL MEDICINE INITIAL VISIT NOTE      CHIEF COMPLAINT     Chief Complaint   Patient presents with    [facility]       HPI     Waldo English is a 65 y.o.  male who presents with HLD, non-obstructive CAD, GERD, IFG, interstitial lung disease and seen by pulm in the past, former smoker (smoked in the past but not for very long and quit in 2000), family hx colon CA in father, allergic rhinitis, ?BPH (denies sx and states rectal exams previously normal, had elevated PSA once and normal since and on Finasteride 1/4 tablet daily for hair), ROSARIO (mild and on CPAP in the past but no longer req CPAP), former pt of Dr. Maldonado c last EH physical done last July, here today to establish care and EH physical.    His wife Nidhi English (Venita) is also a patient of mine as well as his daughter Jenelle and would like to transfer care so that all family members can share the same provider and also since Dr. Maldonado is now in [place].    Regarding HLD, should be on Atorvastatin nightly but admits he does not take it every day as he was trying to avoid having to take too many meds unless he had to.    Regarding GERD, on Pepcid every other day and Aciphex every other day previously but now tries to go for periods without taking either one.  Periodically takes them when sx present.    No issues c HTN in the past.    Denies issues c chronic rhinitis but states he has used Flonase in the past for seasonal allergies.    Has Albuterol prn for hx interstitial lung disease which was discovered incidentally on coronary CT in the past.  Has been seen by pulm in the past c last visit in Oct c Dr. Collins and has had PFTs and 6MWT last done in Oct which were stable c plan to f/u c them in a year.    Denies BPH sx.    Today also c/o intermittent heel pain.  Worse when stepping down in the morning and improves as the day goes on.  Tried plantar stretching exercises which helped some.  Does not usually walk barefooted.    Past Medical  History:  Past Medical History:   Diagnosis Date    BPH (benign prostatic hypertrophy) 4/29/2015    Chronic rhinitis 4/29/2015    Diverticulosis of large intestine without hemorrhage: see colonoscopy 9/17 3/26/2018    Family history of colon cancer: father age 65 4/29/2015    Former smoker 1/2 pack daily for 10 years quit 2000 4/29/2015    GERD (gastroesophageal reflux disease) 4/29/2015    Hyperlipidemia 4/29/2015    Mitral regurgitation 4/22/2016    Rhinitis 4/29/2015       Past Surgical History:  Past Surgical History:   Procedure Laterality Date    cataract surgery      April then August 2018 bilateral    EXCISION OF GANGLION CYST OF HAND Right 1/19/2022    Procedure: EXCISION, GANGLION CYST, HAND;  Surgeon: Lennox Verdin Jr., MD;  Location: [facility];  Service: Orthopedics;  Laterality: Right;  thumb       Home Medications:  Prior to Admission medications    Medication Sig Start Date End Date Taking? Authorizing Provider   albuterol (PROVENTIL/VENTOLIN HFA) 90 mcg/actuation inhaler Inhale into the lungs. 1/30/24   Provider, Historical   aspirin 81 MG Chew Take 1 tablet (81 mg total) by mouth once daily. 6/1/22   Elissa Maldonado MD   atorvastatin (LIPITOR) 20 MG tablet Take 1 tablet (20 mg total) by mouth once daily. 12/23/24   Elissa Maldonado MD   famotidine (PEPCID) 20 MG tablet Take 20 mg by mouth every other day. 4/1/19   Provider, Historical   finasteride (PROSCAR) 5 mg tablet Take 1 tablet (5 mg total) by mouth once daily. 12/23/24   Elissa Maldonado MD   fluticasone propionate (FLONASE) 50 mcg/actuation nasal spray INHALE 2 SPRAYS INTO EACH NOSTRIL TWICE DAILY 6/1/22   Elissa Maldonado MD   glucosamine/msm/csa/samantha/hzp209 (GLUCOSAMIN-CHOND-MSM-SAMANTHA-115HC ORAL) Take by mouth.    Provider, Historical   hydrocortisone 2.5 % cream  7/29/23   Provider, Historical   RABEprazole (ACIPHEX) 20 mg tablet TAKE 1 TABLET EVERY OTHER DAY 5/30/23   Elissa Maldonado MD       Allergies:  Review of patient's  "allergies indicates:  No Known Allergies    Family History:  Family History   Problem Relation Name Age of Onset    Hypertension Mother May 2019     Hyperlipidemia Mother May 2019     Heart disease Mother May 2019         A fib    Pulmonary fibrosis Mother May 2019     Cancer Father Smoker 65        colon; brain; liver; bladder; lung    Heart disease Father Smoker         porcine valve    Multiple sclerosis Sister MS     Diabetes Sister      No Known Problems Sister      No Known Problems Brother      No Known Problems Daughter Jenelle        Social History:  Social History[1]    Review of Systems:  Review of Systems   Constitutional:  Negative for appetite change, chills, fatigue, fever and unexpected weight change.   HENT:  Negative for congestion, hearing loss and rhinorrhea.    Eyes:  Negative for pain and visual disturbance.   Respiratory:  Negative for cough, chest tightness, shortness of breath and wheezing.    Cardiovascular:  Negative for chest pain, palpitations and leg swelling.   Gastrointestinal:  Negative for abdominal distention and abdominal pain.   Endocrine: Negative for polydipsia and polyuria.   Genitourinary:  Negative for decreased urine volume, dysuria, frequency and penile discharge.   Neurological:  Negative for dizziness, weakness, numbness and headaches.   Psychiatric/Behavioral:  Negative for behavioral problems and confusion.        Health Maintenance:   Immunizations:   Influenza 10/2023, thinks he got it again at work 2 mos at work,  rec repeat this Fall  TDap 5/2023  Prevnar 20 done 4/2024  Shingrix 4/2023, 9/2023  COVID 3/2021, 4/2021, 1/2022  RSV rec now.    Cancer Screening:  Colonoscopy:  12/2022 via Dr. Blanche giraldo rec repeat in 5 years      PHYSICAL EXAM     /70   Pulse 83   Ht 5' 10" (1.778 m)   Wt 80.8 kg (178 lb 2.1 oz)   SpO2 97%   BMI 25.56 kg/m²     GEN - A+OX4, NAD   HEENT - no cervical LAD, no thyroid masses appreciated, TM clear and gray  CV - RRR, no " m/r/g  Chest - CTAB, no wheezing, crackles, or rhonchi  Abd - S/NT/ND/+BS.   Ext - 2+BDP. No C/C/E.  LN - No LAD appreciated.        LABS     Lab Results   Component Value Date    WBC 6.24 03/21/2025    HGB 15.2 03/21/2025    HCT 45.2 03/21/2025    MCV 91 03/21/2025     03/21/2025     Sodium   Date Value Ref Range Status   03/21/2025 140 136 - 145 mmol/L Final     Potassium   Date Value Ref Range Status   03/21/2025 4.4 3.5 - 5.1 mmol/L Final     Chloride   Date Value Ref Range Status   03/21/2025 106 95 - 110 mmol/L Final     CO2   Date Value Ref Range Status   03/21/2025 26 23 - 29 mmol/L Final     Glucose   Date Value Ref Range Status   03/21/2025 102 70 - 110 mg/dL Final     BUN   Date Value Ref Range Status   03/21/2025 11 8 - 23 mg/dL Final     Creatinine   Date Value Ref Range Status   03/21/2025 0.8 0.5 - 1.4 mg/dL Final     Calcium   Date Value Ref Range Status   03/21/2025 9.3 8.7 - 10.5 mg/dL Final     Total Protein   Date Value Ref Range Status   03/21/2025 7.6 6.0 - 8.4 g/dL Final     Albumin   Date Value Ref Range Status   03/21/2025 3.8 3.5 - 5.2 g/dL Final     Total Bilirubin   Date Value Ref Range Status   03/21/2025 0.7 0.1 - 1.0 mg/dL Final     Comment:     For infants and newborns, interpretation of results should be based  on gestational age, weight and in agreement with clinical  observations.    Premature Infant recommended reference ranges:  Up to 24 hours.............<8.0 mg/dL  Up to 48 hours............<12.0 mg/dL  3-5 days..................<15.0 mg/dL  6-29 days.................<15.0 mg/dL       Alkaline Phosphatase   Date Value Ref Range Status   03/21/2025 73 40 - 150 U/L Final     AST   Date Value Ref Range Status   03/21/2025 20 10 - 40 U/L Final     ALT   Date Value Ref Range Status   03/21/2025 20 10 - 44 U/L Final     Anion Gap   Date Value Ref Range Status   03/21/2025 8 8 - 16 mmol/L Final     eGFR if    Date Value Ref Range Status   06/01/2022 >60.0 >60  mL/min/1.73 m^2 Final     eGFR if non    Date Value Ref Range Status   06/01/2022 >60.0 >60 mL/min/1.73 m^2 Final     Comment:     Calculation used to obtain the estimated glomerular filtration  rate (eGFR) is the CKD-EPI equation.        Lab Results   Component Value Date    HGBA1C 5.4 03/21/2025     Lab Results   Component Value Date    LDLCALC 86.2 03/21/2025     Lab Results   Component Value Date    TSH 2.097 03/21/2025       ASSESSMENT/PLAN     Waldo English is a 65 y.o. male with  Waldo was seen today for [facility].    Diagnoses and all orders for this visit:    Visit for annual health examination  History and physical exam completed.  Health maintenance reviewed as above.  All labs reviewed c patient in acceptable range x LDL above goal  Discussed taking statin nightly as rx'ed.  If above goal on f/u labs in 4 to 6 mos would rec increasing dose.    Regarding heel pain, suspect plantar fasciitis.  Given additional stretching exercises and advised to stretch regularly.  Can see podiatry if sx persist.    Interstitial lung disease  As per HPI  Asymptomatic  Can cont to see Pulm annually for PFTs and 6MWT, sooner if issues    Mixed hyperlipidemia  Lab Results   Component Value Date    LDLCALC 86.2 03/21/2025     As above  Discussed taking statin nightly as rx'ed.  If above goal on f/u labs in 4 to 6 mos would rec increasing dose.-     atorvastatin (LIPITOR) 20 MG tablet; Take 1 tablet (20 mg total) by mouth every evening.    Encounter for abdominal aortic aneurysm (AAA) screening  Rec AAA screen since now 65, former smoker, will schedule  -     US Abdominal Aorta; Future    Former smoker  -     US Abdominal Aorta; Future    Coronary artery disease: CT score 27 2023  Asymptomatic  Continue blood pressure control and lipid control for secondary prevention.      HM as above    RTC in 1 year, sooner if needed.  Repeat lipids in 4 to 6 mos, pt to call later to schedule and can message him c  results.  RSV vaccine today.    Dara Mojica MD  Department of Internal Medicine - [facility]  2025         [1]   Social History  Tobacco Use    Smoking status: Former     Current packs/day: 0.00     Average packs/day: 0.5 packs/day for 20.0 years (10.0 ttl pk-yrs)     Types: Cigarettes     Start date: 1980     Quit date: 2000     Years since quittin.9     Passive exposure: Never    Smokeless tobacco: Never   Substance Use Topics    Alcohol use: Yes     Comment: Rarely, maybe 1-2 drinks a week if that    Drug use: No

## 2025-03-21 NOTE — LETTER
March 21, 2025    Waldo English  [address] [place] [address]      Dear Waldo English,    On 3/21/2025, it was a pleasure to see you and perform your Executive [facility] evaluation. Your Primary Care physician is Dara Mojica MD. At the time of this visit, you are 65 y.o..  You denied any specific complaints or concerns during todays visit.        YOUR PAST MEDICAL HISTORY:  has a past medical history of BPH (benign prostatic hypertrophy), Chronic rhinitis, Diverticulosis of large intestine without hemorrhage: see colonoscopy 9/17, Family history of colon cancer: father age 65, Former smoker 1/2 pack daily for 10 years quit 2000, GERD (gastroesophageal reflux disease), Hyperlipidemia, Mitral regurgitation, and Rhinitis..    YOUR PAST SURGICAL HISTORY:  has a past surgical history that includes cataract surgery and Excision of ganglion cyst of hand (Right, 1/19/2022)..    YOUR CURRENT MEDICATIONS:   Current Outpatient Medications:     albuterol (PROVENTIL/VENTOLIN HFA) 90 mcg/actuation inhaler, Inhale into the lungs., Disp: , Rfl:     aspirin 81 MG Chew, Take 1 tablet (81 mg total) by mouth once daily., Disp: , Rfl:     famotidine (PEPCID) 20 MG tablet, Take 20 mg by mouth every other day., Disp: , Rfl:     finasteride (PROSCAR) 5 mg tablet, Take 1 tablet (5 mg total) by mouth once daily., Disp: 90 tablet, Rfl: 1    fluticasone propionate (FLONASE) 50 mcg/actuation nasal spray, INHALE 2 SPRAYS INTO EACH NOSTRIL TWICE DAILY, Disp: 48 g, Rfl: 3    glucosamine/msm/csa/samantha/jsr318 (GLUCOSAMIN-CHOND-MSM-SAMANTHA-115HC ORAL), Take by mouth., Disp: , Rfl:     hydrocortisone 2.5 % cream, , Disp: , Rfl:     RABEprazole (ACIPHEX) 20 mg tablet, TAKE 1 TABLET EVERY OTHER DAY, Disp: 45 tablet, Rfl: 3    atorvastatin (LIPITOR) 20 MG tablet, Take 1 tablet (20 mg total) by mouth every evening., Disp: 90 tablet, Rfl: 3    RSV, preF A and preF B,PF, (ABRYSVO, PF,) 120 mcg/0.5 mL SolR vaccine, Inject 0.5 mLs (120 mcg total) into the  "muscle once. for 1 dose, Disp: 1 each, Rfl: 0.    YOUR KNOWN DRUG ALLERGIES:  has no known allergies.    YOUR SOCIAL HISTORY:  reports that he quit smoking about 24 years ago. His smoking use included cigarettes. He started smoking about 44 years ago. He has a 10 pack-year smoking history. He has never been exposed to tobacco smoke. He has never used smokeless tobacco. He reports current alcohol use. He reports that he does not use drugs..    YOUR FAMILY HISTORY: family history includes Cancer (age of onset: 65) in his father; Diabetes in his sister; Heart disease in his father and mother; Hyperlipidemia in his mother; Hypertension in his mother; Multiple sclerosis in his sister; No Known Problems in his brother, daughter, and sister; Pulmonary fibrosis in his mother..     YOUR ROUTINE HEALTH MAINTENANCE includes   Health Maintenance   Topic Date Due    Abdominal Aortic Aneurysm Screening  Never done    COVID-19 Vaccine (4 - 2024-25 season) 03/21/2026 (Originally 9/1/2024)    PROSTATE-SPECIFIC ANTIGEN  03/21/2026    High Dose Statin  03/21/2026    Hemoglobin A1c (Prediabetes)  03/21/2026    Lipid Panel  03/21/2026    Colorectal Cancer Screening  12/14/2027    TETANUS VACCINE  05/17/2033    Hepatitis C Screening  Completed    Shingles Vaccine  Completed    Influenza Vaccine  Completed    HIV Screening  Completed    RSV Vaccine (Age 60+ and Pregnant patients)  Completed    Pneumococcal Vaccines (Age 50+)  Completed   .    PHYSICAL EXAMINATION:  Vitals:    03/21/25 0957   BP: 130/70   Pulse: 83   SpO2: 97%   Weight: 80.8 kg (178 lb 2.1 oz)   Height: 5' 10" (1.778 m)       These are normal vital signs. Your physical examination did not reveal any additional significant abnormal findings.    YOUR BLOOD WORK AND ADDITIONAL TESTS:  Labs acceptable other than slightly elevated cholesterol.  For this, I recommend taking your Atorvastatin nightly as prescribed.  I also recommend a low fat diet and exercise 5 days a week.  We " can repeat the labs in four to six months.  If your LDL (bad cholesterol) remains above 70 at that time, I would likely recommend increasing your Atorvastatin dose..    Your EKG shows normal sinus rhythm and is stable compared to previous.    In summary, you are overall in excellent health.     It was a pleasure to see you and perform this Executive Health evaluation. If I can be of any further assistance, or if you have any additional questions or concerns, please do not hesitate to let me know.    Sincerely,      Dara Mojica MD

## 2025-03-27 ENCOUNTER — RESULTS FOLLOW-UP (OUTPATIENT)
Dept: INTERNAL MEDICINE | Facility: CLINIC | Age: 65
End: 2025-03-27

## 2025-04-28 ENCOUNTER — TELEPHONE (OUTPATIENT)
Dept: PHARMACY | Facility: CLINIC | Age: 65
End: 2025-04-28
Payer: COMMERCIAL

## 2025-04-28 NOTE — TELEPHONE ENCOUNTER
[facility]/Population Health Chart Review & Patient Outreach Details For Medication Adherence Project    Reason for Outreach Encounter: 3rd Party payor non-compliance report ([employer], [employer], C, etc)  2.  Patient Outreach Method: Reviewed Patient Chart  3.   Medication in question: atorvastatin   LAST FILLED: 3/21/25 for 90 day supply  Hyperlipidemia Medications              atorvastatin (LIPITOR) 20 MG tablet Take 1 tablet (20 mg total) by mouth every evening.               4.  Reviewed and or Updates Made To: Patient Chart  5. Outreach Outcomes and/or actions taken: Patient filled medication and is on track to be adherent

## 2025-08-03 ENCOUNTER — TELEPHONE (OUTPATIENT)
Dept: PHARMACY | Facility: CLINIC | Age: 65
End: 2025-08-03
Payer: COMMERCIAL